# Patient Record
Sex: FEMALE | Race: OTHER | NOT HISPANIC OR LATINO | Employment: OTHER | URBAN - METROPOLITAN AREA
[De-identification: names, ages, dates, MRNs, and addresses within clinical notes are randomized per-mention and may not be internally consistent; named-entity substitution may affect disease eponyms.]

---

## 2017-12-28 ENCOUNTER — APPOINTMENT (OUTPATIENT)
Dept: WOMENS IMAGING | Facility: HOSPITAL | Age: 47
End: 2017-12-28

## 2017-12-28 PROCEDURE — 77067 SCR MAMMO BI INCL CAD: CPT | Performed by: RADIOLOGY

## 2018-10-09 ENCOUNTER — RESULTS ENCOUNTER (OUTPATIENT)
Dept: ONCOLOGY | Facility: CLINIC | Age: 48
End: 2018-10-09

## 2018-10-09 ENCOUNTER — CONSULT (OUTPATIENT)
Dept: ONCOLOGY | Facility: CLINIC | Age: 48
End: 2018-10-09

## 2018-10-09 VITALS
BODY MASS INDEX: 27.97 KG/M2 | HEART RATE: 98 BPM | RESPIRATION RATE: 18 BRPM | DIASTOLIC BLOOD PRESSURE: 77 MMHG | TEMPERATURE: 98.6 F | HEIGHT: 66 IN | SYSTOLIC BLOOD PRESSURE: 123 MMHG | WEIGHT: 174 LBS

## 2018-10-09 DIAGNOSIS — C50.412 MALIGNANT NEOPLASM OF UPPER-OUTER QUADRANT OF LEFT BREAST IN FEMALE, ESTROGEN RECEPTOR POSITIVE (HCC): ICD-10-CM

## 2018-10-09 DIAGNOSIS — C50.412 MALIGNANT NEOPLASM OF UPPER-OUTER QUADRANT OF LEFT BREAST IN FEMALE, ESTROGEN RECEPTOR POSITIVE (HCC): Primary | ICD-10-CM

## 2018-10-09 DIAGNOSIS — Z17.0 MALIGNANT NEOPLASM OF UPPER-OUTER QUADRANT OF LEFT BREAST IN FEMALE, ESTROGEN RECEPTOR POSITIVE (HCC): Primary | ICD-10-CM

## 2018-10-09 DIAGNOSIS — Z17.0 MALIGNANT NEOPLASM OF UPPER-OUTER QUADRANT OF LEFT BREAST IN FEMALE, ESTROGEN RECEPTOR POSITIVE (HCC): ICD-10-CM

## 2018-10-09 PROBLEM — C50.419 MALIGNANT NEOPLASM OF UPPER-OUTER QUADRANT OF BREAST IN FEMALE, ESTROGEN RECEPTOR POSITIVE: Status: ACTIVE | Noted: 2018-10-09

## 2018-10-09 PROCEDURE — 99245 OFF/OP CONSLTJ NEW/EST HI 55: CPT | Performed by: INTERNAL MEDICINE

## 2018-10-09 RX ORDER — TRAMADOL HYDROCHLORIDE 50 MG/1
TABLET ORAL
COMMUNITY
End: 2022-10-05 | Stop reason: SDUPTHER

## 2018-10-09 RX ORDER — GABAPENTIN 300 MG/1
CAPSULE ORAL
COMMUNITY
End: 2020-01-22 | Stop reason: ALTCHOICE

## 2018-10-09 RX ORDER — CYCLOBENZAPRINE HCL 10 MG
TABLET ORAL
COMMUNITY
Start: 2018-08-18 | End: 2022-04-21 | Stop reason: SDUPTHER

## 2018-10-09 RX ORDER — EMPAGLIFLOZIN 25 MG/1
TABLET, FILM COATED ORAL
COMMUNITY
Start: 2018-08-19 | End: 2022-06-17

## 2018-10-09 RX ORDER — FEXOFENADINE HCL 180 MG/1
180 TABLET ORAL DAILY
Status: CANCELLED | OUTPATIENT
Start: 2018-10-09

## 2018-10-09 RX ORDER — RANITIDINE 300 MG/1
TABLET ORAL
COMMUNITY
End: 2019-06-19 | Stop reason: SDUPTHER

## 2018-10-09 RX ORDER — LEVOTHYROXINE SODIUM 0.03 MG/1
TABLET ORAL
COMMUNITY
Start: 2018-10-08 | End: 2022-12-21 | Stop reason: ALTCHOICE

## 2018-10-09 RX ORDER — TAMOXIFEN CITRATE 20 MG/1
TABLET ORAL
COMMUNITY
Start: 2018-08-27 | End: 2019-10-21 | Stop reason: SDUPTHER

## 2018-10-09 NOTE — PROGRESS NOTES
CHIEF COMPLAINT: Management of breast cancer    REASON FOR REFERRAL: Management of breast cancer      RECORDS OBTAINED  Records of the patients history including those obtained from  Dr. marquez were reviewed and summarized in detail.    Oncology/Hematology History    1. Breast cancer: She had breast cancer biopsied 2/6/18 by Dr. marquez showing infiltrating ductal carcinoma [address] [address] 6 out of 960% 1+ HER-2/magaly, ER greater than 95% 2+, [place] 95% 2-3+.  On 3/13/18 she had lumpectomy for a 2.5 cm 0 out of one grade 2 infiltrating lobular carcinoma.  Mammaprint came back with a 10% distant risk of recurrence on hormonal blockade alone.  Because of the tumor over 2 cm in size, Dr. Tao recommended Taxotere and Cytoxan but after the second course of therapy she had a severe allergic reaction with hives.  She received 30 radiation treatments and, given a history of DEANA without BSO for fibroids in 2012, Dr. Toya Carrillo due to her hormonal levels and found her to be premenopausal.  Hence she was started on tamoxifen for which she has been taking gabapentin for hot flashes.  Still having a lot of dermatographia is.  Has a hiatal hernia as well as a hearing loss since that time.  She came to me for the first time 10/9/18 asking me to assume her care.  She was concerned regarding some hip pain for which she had been told in the past there was some tumor in the abdomen and seeing orthopedics for was told she did not need to do anything about it.  She also had hip injections for spinal stenosis with neurology and movable.  I asked her to get her prior bone imaging reports and discs and I'm getting a total body bone scan.  She had previous genetic testing August 2018 negative for BRCA and she is going to get those reports for rest of review what genetic test were done.  Assuming the bone scan is unremarkable and the outside imaging does not suggest any other worrisome bony abnormalities and I certainly think at least 5  "years of tamoxifen is reasonable.  She will continue with the gabapentin.         Malignant neoplasm of upper-outer quadrant of breast in female, estrogen receptor positive (CMS/HCC)    10/9/2018 Initial Diagnosis     Malignant neoplasm of upper-outer quadrant of breast in female, estrogen receptor positive (CMS/HCC)            HISTORY OF PRESENT ILLNESS:  The patient is a 48 y.o.  female, referred for management of breast cancer.  See above for her oncology history of present illness.    REVIEW OF SYSTEMS:  A 14 point review of systems was performed and is negative except as noted above.    Past Medical History:   Diagnosis Date   • Breast cancer (CMS/HCC) 2018   • Diabetes mellitus (CMS/HCC)    • Disease of thyroid gland      Past Surgical History:   Procedure Laterality Date   • BREAST LUMPECTOMY Left 02/2018   • KNEE ARTHROSCOPY  2004       No current outpatient prescriptions on file prior to visit.     No current facility-administered medications on file prior to visit.        Allergies   Allergen Reactions   • Codeine GI Intolerance   • Morphine GI Intolerance   • Taxotere [Docetaxel] Hives   • Cytoxan [Cyclophosphamide] Hives   • Zyrtec [Cetirizine] Rash       Social History     Social History   • Marital status: Unknown     Social History Main Topics   • Smoking status: Never Smoker   • Alcohol use Yes      Comment: twice per year   • Drug use: Unknown     Other Topics Concern   • Not on file       Family History   Problem Relation Age of Onset   • Uterine cancer Mother    • Lung cancer Father    • Breast cancer Sister        PHYSICAL EXAM:    /77   Pulse 98   Temp 98.6 °F (37 °C)   Resp 18   Ht 167.6 cm (66\")   Wt 78.9 kg (174 lb)   BMI 28.08 kg/m²     ECOG: (0) Fully active, able to carry on all predisease performance without restriction  General: well appearing female in no acute distress  HEENT: sclera anicteric, oropharynx clear  Lymphatics: no cervical, supraclavicular, inguinal, or " axillary adenopathy  Cardiovascular: regular rate and rhythm, no murmurs  Neck: Supple; No thyromegaly  Lungs: clear to auscultation bilaterally. No respiratory distress.   Abdomen: soft, nontender, nondistended.  No palpable organomegaly  Extremities: no cyanosis, clubbing, edema, or cords  Skin: no rashes, lesions, bruising, or petechiae  Neuro: Alert and oriented x 4; Moving all extremities.  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  Psych: No anxiety or depression  Left breast with some mild residual inflammation post radiation.  Right breast benign.  No visits with results within 6 Week(s) from this visit.   Latest known visit with results is:   No results found for any previous visit.       Assessment/Plan   Breast cancer: She had breast cancer biopsied 2/6/18 by Dr. marquez showing infiltrating ductal carcinoma [address] 6 out of 960% 1+ HER-2/magaly, ER greater than 95% 2+, [place] 95% 2-3+.  On 3/13/18 she had lumpectomy for a 2.5 cm 0 out of one grade 2 infiltrating lobular carcinoma.  Mammaprint came back with a 10% distant risk of recurrence on hormonal blockade alone.  Because of the tumor over 2 cm in size, Dr. Tao recommended Taxotere and Cytoxan but after the second course of therapy she had a severe allergic reaction with hives.  She received 30 radiation treatments and, given a history of DEANA without BSO for fibroids in 2012, Dr. Toya Carrillo due to her hormonal levels and found her to be premenopausal.  Hence she was started on tamoxifen for which she has been taking gabapentin for hot flashes.  Still having a lot of dermatographia is.  Has a hiatal hernia as well as a hearing loss since that time.  She came to me for the first time 10/9/18 asking me to assume her care.  She was concerned regarding some hip pain for which she had been told in the past there was some tumor in the abdomen and seeing orthopedics for was told she did not need to do anything about it.  She also had hip  injections for spinal stenosis with neurology and movable.  I asked her to get her prior bone imaging reports and discs and I'm getting a total body bone scan.  She had previous genetic testing August 2018 negative for BRCA and she is going to get those reports for rest of review what genetic test were done.  Assuming the bone scan is unremarkable and the outside imaging does not suggest any other worrisome bony abnormalities and I certainly think at least 5 years of tamoxifen is reasonable.  She will continue with the gabapentin.  She will get a mammogram in November as previously planned and she will follow-up with Dr. marquez regarding the residual inflammation and cellulitis.  Discussed with patient greater than 1 hour greater than 50% spent counseling        Derrick Terry MD    10/9/2018

## 2018-10-10 LAB
ALBUMIN SERPL-MCNC: 4.6 G/DL (ref 3.5–5.5)
ALBUMIN/GLOB SERPL: 1.8 {RATIO} (ref 1.2–2.2)
ALP SERPL-CCNC: 63 IU/L (ref 39–117)
ALT SERPL-CCNC: 18 IU/L (ref 0–32)
AST SERPL-CCNC: 20 IU/L (ref 0–40)
BASOPHILS # BLD AUTO: 0 X10E3/UL (ref 0–0.2)
BASOPHILS NFR BLD AUTO: 0 %
BILIRUB SERPL-MCNC: <0.2 MG/DL (ref 0–1.2)
BUN SERPL-MCNC: 14 MG/DL (ref 6–24)
BUN/CREAT SERPL: 20 (ref 9–23)
CALCIUM SERPL-MCNC: 10 MG/DL (ref 8.7–10.2)
CHLORIDE SERPL-SCNC: 101 MMOL/L (ref 96–106)
CO2 SERPL-SCNC: 19 MMOL/L (ref 20–29)
CREAT SERPL-MCNC: 0.71 MG/DL (ref 0.57–1)
EOSINOPHIL # BLD AUTO: 0.2 X10E3/UL (ref 0–0.4)
EOSINOPHIL NFR BLD AUTO: 3 %
ERYTHROCYTE [DISTWIDTH] IN BLOOD BY AUTOMATED COUNT: 14.6 % (ref 12.3–15.4)
GLOBULIN SER CALC-MCNC: 2.5 G/DL (ref 1.5–4.5)
GLUCOSE SERPL-MCNC: 137 MG/DL (ref 65–99)
HCT VFR BLD AUTO: 45.2 % (ref 34–46.6)
HGB BLD-MCNC: 14.9 G/DL (ref 11.1–15.9)
IMM GRANULOCYTES # BLD: 0 X10E3/UL (ref 0–0.1)
IMM GRANULOCYTES NFR BLD: 0 %
LYMPHOCYTES # BLD AUTO: 1.9 X10E3/UL (ref 0.7–3.1)
LYMPHOCYTES NFR BLD AUTO: 27 %
MCH RBC QN AUTO: 27.5 PG (ref 26.6–33)
MCHC RBC AUTO-ENTMCNC: 33 G/DL (ref 31.5–35.7)
MCV RBC AUTO: 83 FL (ref 79–97)
MONOCYTES # BLD AUTO: 0.3 X10E3/UL (ref 0.1–0.9)
MONOCYTES NFR BLD AUTO: 4 %
NEUTROPHILS # BLD AUTO: 4.5 X10E3/UL (ref 1.4–7)
NEUTROPHILS NFR BLD AUTO: 66 %
PLATELET # BLD AUTO: 309 X10E3/UL (ref 150–379)
POTASSIUM SERPL-SCNC: 4.5 MMOL/L (ref 3.5–5.2)
PROT SERPL-MCNC: 7.1 G/DL (ref 6–8.5)
RBC # BLD AUTO: 5.42 X10E6/UL (ref 3.77–5.28)
SODIUM SERPL-SCNC: 142 MMOL/L (ref 134–144)
WBC # BLD AUTO: 6.9 X10E3/UL (ref 3.4–10.8)

## 2018-10-23 ENCOUNTER — HOSPITAL ENCOUNTER (OUTPATIENT)
Dept: NUCLEAR MEDICINE | Facility: HOSPITAL | Age: 48
Discharge: HOME | End: 2018-10-23
Attending: INTERNAL MEDICINE

## 2018-10-23 DIAGNOSIS — Z17.0 MALIGNANT NEOPLASM OF UPPER-OUTER QUADRANT OF LEFT BREAST IN FEMALE, ESTROGEN RECEPTOR POSITIVE (HCC): ICD-10-CM

## 2018-10-23 DIAGNOSIS — C50.412 MALIGNANT NEOPLASM OF UPPER-OUTER QUADRANT OF LEFT BREAST IN FEMALE, ESTROGEN RECEPTOR POSITIVE (HCC): ICD-10-CM

## 2018-10-23 PROCEDURE — 78306 BONE IMAGING WHOLE BODY: CPT

## 2018-10-23 PROCEDURE — 0 TECHNETIUM MEDRONATE KIT: Performed by: INTERNAL MEDICINE

## 2018-10-23 PROCEDURE — A9503 TC99M MEDRONATE: HCPCS | Performed by: INTERNAL MEDICINE

## 2018-10-23 RX ORDER — TC 99M MEDRONATE 20 MG/10ML
26.2 INJECTION, POWDER, LYOPHILIZED, FOR SOLUTION INTRAVENOUS
Status: COMPLETED | OUTPATIENT
Start: 2018-10-23 | End: 2018-10-23

## 2018-10-23 RX ADMIN — Medication 26.2 MILLICURIE: at 09:20

## 2018-11-13 ENCOUNTER — OFFICE VISIT (OUTPATIENT)
Dept: ONCOLOGY | Facility: CLINIC | Age: 48
End: 2018-11-13

## 2018-11-13 VITALS
HEIGHT: 66 IN | BODY MASS INDEX: 28.12 KG/M2 | RESPIRATION RATE: 16 BRPM | SYSTOLIC BLOOD PRESSURE: 141 MMHG | TEMPERATURE: 97.8 F | DIASTOLIC BLOOD PRESSURE: 86 MMHG | HEART RATE: 93 BPM | WEIGHT: 175 LBS

## 2018-11-13 DIAGNOSIS — C50.412 MALIGNANT NEOPLASM OF UPPER-OUTER QUADRANT OF LEFT BREAST IN FEMALE, ESTROGEN RECEPTOR POSITIVE (HCC): Primary | ICD-10-CM

## 2018-11-13 DIAGNOSIS — Z17.0 MALIGNANT NEOPLASM OF UPPER-OUTER QUADRANT OF LEFT BREAST IN FEMALE, ESTROGEN RECEPTOR POSITIVE (HCC): Primary | ICD-10-CM

## 2018-11-13 PROCEDURE — 99214 OFFICE O/P EST MOD 30 MIN: CPT | Performed by: INTERNAL MEDICINE

## 2018-11-13 NOTE — PROGRESS NOTES
CHIEF COMPLAINT: Follow-up breast cancer    Problem List:  Oncology/Hematology History    1. Breast cancer: She had breast cancer biopsied 2/6/18 by Dr. marquez showing infiltrating ductal carcinoma [address] 6 out of 960% 1+ HER-2/magaly, ER greater than 95% 2+, CT 95% 2-3+.  On 3/13/18 she had lumpectomy for a 2.5 cm 0 out of one grade 2 infiltrating lobular carcinoma.  Mammaprint came back with a 10% distant risk of recurrence on hormonal blockade alone.  Because of the tumor over 2 cm in size, Dr. Tao recommended Taxotere and Cytoxan but after the second course of therapy she had a severe allergic reaction with hives.  She received 30 radiation treatments and, given a history of DENAA without BSO for fibroids in 2012, Dr. Toya Carrillo due to her hormonal levels and found her to be premenopausal.  Hence she was started on tamoxifen for which she has been taking gabapentin for hot flashes.  Still having a lot of dermatographia is.  Has a hiatal hernia as well as a hearing loss since that time.  She came to me for the first time 10/9/18 asking me to assume her care.  She was concerned regarding some hip pain for which she had been told in the past there was some tumor in the abdomen and seeing orthopedics for was told she did not need to do anything about it.  She also had hip injections for spinal stenosis with neurology and movable.  I asked her to get her prior bone imaging reports and discs and I'm getting a total body bone scan.  She had previous genetic testing August 2018 negative for BRCA and she is going to get those reports for rest of review what genetic test were done.  Assuming the bone scan is unremarkable and the outside imaging does not suggest any other worrisome bony abnormalities and I certainly think at least 5 years of tamoxifen is reasonable.  She will continue with the gabapentin.     Per records from Dr. Dr. Tao, she completed radiation to the left breast 7/30/18.  Because of  shortness of breath with pleurisy, CT PE protocol 5/8/18 was done and no PE found.  ENT evaluation 5/17/18 with flexible laryngoscopy showed widely patent airway and prednisone was tapered post hypersensitivity reaction to the Taxotere.  Per note from . Dr. Tao of 8/27/18, BRCA1 and BRCA2 mutation analysis was ordered.  This was done with [employer] which showed no clinically significant mutation but there was a variant of uncertain significance found.  I recommended repeating this with cancernext panel for more thorough evaluation.  According to a note from her primary care in 2015 there was mention of an MRI showing no change in a thigh lipoma which I suspect is the mass she is referring to.  I performed total body bone scan 10/23/18 which was entirely negative.  She does have a large mass in the superior lateral aspect of her left thigh which has not grown and likely lipomatous but given the size of this, I am referring her to [facility] for evaluation.  She brought her discs with her on her visit to see me on 11/13/18 and she will take those to Dr. Parham and then bring them back to me for us to load into our system for the future.  In the meantime I will get her to our genetic counselors for further testing and to have her monitored over time in the event that her variant of undetermined significance becomes one that is determined to be significant.  She is due for her mammogram 11/14/18 and she will see my nurse practitioner back in about 6 weeks and hopefully in the meantime we will have some answers from Dr. Parham and we will see where the genetic counseling stands.  Her CBC and CMP were essentially unremarkable save for slightly elevated glucose.        Malignant neoplasm of upper-outer quadrant of breast in female, estrogen receptor positive (CMS/HCC)    10/9/2018 Initial Diagnosis     Malignant neoplasm of upper-outer quadrant of breast in female, estrogen receptor positive (CMS/HCC)       "      HISTORY OF PRESENT ILLNESS:  The patient is a 48 y.o. female, here for follow up on management of adjuvant therapy of breast cancer      Past Medical History:   Diagnosis Date   • Breast cancer (CMS/HCC) 2018   • Diabetes mellitus (CMS/HCC)    • Disease of thyroid gland      Past Surgical History:   Procedure Laterality Date   • BREAST LUMPECTOMY Left 02/2018   • KNEE ARTHROSCOPY  2004       Allergies   Allergen Reactions   • Codeine GI Intolerance   • Morphine GI Intolerance   • Taxotere [Docetaxel] Hives   • Cytoxan [Cyclophosphamide] Hives   • Zyrtec [Cetirizine] Rash       Family History and Social History reviewed and changed as necessary      REVIEW OF SYSTEM:   Review of Systems   Constitutional: Negative for appetite change, chills, diaphoresis, fatigue, fever and unexpected weight change.   HENT:   Negative for mouth sores, sore throat and trouble swallowing.    Eyes: Negative for icterus.   Respiratory: Negative for cough, hemoptysis and shortness of breath.    Cardiovascular: Negative for chest pain, leg swelling and palpitations.   Gastrointestinal: Negative for abdominal distention, abdominal pain, blood in stool, constipation, diarrhea, nausea and vomiting.   Endocrine: Negative for hot flashes.   Genitourinary: Negative for bladder incontinence, difficulty urinating, dysuria, frequency and hematuria.    Musculoskeletal: Negative for gait problem, neck pain and neck stiffness.   Skin: Negative for rash.   Neurological: Negative for dizziness, gait problem, headaches, light-headedness and numbness.   Hematological: Negative for adenopathy. Does not bruise/bleed easily.   Psychiatric/Behavioral: Negative for depression. The patient is not nervous/anxious.    All other systems reviewed and are negative.       PHYSICAL EXAM    Vitals:    11/13/18 0947   BP: 141/86   Pulse: 93   Resp: 16   Temp: 97.8 °F (36.6 °C)   Weight: 79.4 kg (175 lb)   Height: 167.6 cm (66\")     Constitutional: Appears " well-developed and well-nourished. No distress.   ECOG: (0) Fully active, able to carry on all predisease performance without restriction  HENT:   Head: Normocephalic.   Mouth/Throat: Oropharynx is clear and moist.   Eyes: Conjunctivae are normal. Pupils are equal, round, and reactive to light. No scleral icterus.   Neck: Neck supple. No JVD present. No thyromegaly present.   Cardiovascular: Normal rate, regular rhythm and normal heart sounds.    Pulmonary/Chest: Breath sounds normal. No respiratory distress.   Abdominal: Soft. Exhibits no distension and no mass. There is no hepatosplenomegaly. There is no tenderness. There is no rebound and no guarding.   Musculoskeletal:Exhibits no edema, tenderness or deformity.   Neurological: Alert and oriented to person, place, and time. Exhibits normal muscle tone.   Skin: No ecchymosis, no petechiae and no rash noted. Not diaphoretic. No cyanosis. Nails show no clubbing.   Psychiatric: Normal mood and affect.   Vitals reviewed.      No radiology results for the last 7 days          ASSESSMENT & PLAN:    1. Breast cancer:   2. History of DEANA/BSO   3. BRCA variant of undetermined significance   4. 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  On 3/13/18 she had lumpectomy for a 2.5 cm 0 out of one grade 2 infiltrating lobular carcinoma.  Mammaprint came back with a 10% distant risk of recurrence on hormonal blockade alone.  Because of the tumor over 2 cm in size, Dr. Tao recommended Taxotere and Cytoxan but after the second course of therapy she had a severe allergic reaction with hives.  She received 30 radiation treatments and, given a history of DEANA without BSO for fibroids in 2012, Dr. Toya Carrillo due to her hormonal levels and found her to be  premenopausal.  Hence she was started on tamoxifen for which she has been taking gabapentin for hot flashes.  Still having a lot of dermatographia is.  Has a hiatal hernia as well as a hearing loss since that time.  She came to me for the first time 10/9/18 asking me to assume her care.  She was concerned regarding some hip pain for which she had been told in the past there was some tumor in the abdomen and seeing orthopedics for was told she did not need to do anything about it.  She also had hip injections for spinal stenosis with neurology and movable.  I asked her to get her prior bone imaging reports and discs and I'm getting a total body bone scan.  She had previous genetic testing August 2018 negative for BRCA and she is going to get those reports for rest of review what genetic test were done.  Assuming the bone scan is unremarkable and the outside imaging does not suggest any other worrisome bony abnormalities and I certainly think at least 5 years of tamoxifen is reasonable.  She will continue with the gabapentin.     Per records from Dr. Tao, she completed radiation to the left breast 7/30/18.  Because of shortness of breath with pleurisy, CT PE protocol 5/8/18 was done and no PE found.  ENT evaluation 5/17/18 with flexible laryngoscopy showed widely patent airway and prednisone was tapered post hypersensitivity reaction to the Taxotere.  Per note from . Dr. Tao of 8/27/18, BRCA1 and BRCA2 mutation analysis was ordered.  This was done with [employer] which showed no clinically significant mutation but there was a variant of uncertain significance found.  I recommended repeating this with cancernext panel for more thorough evaluation.  According to a note from her primary care in 2015 there was mention of an MRI showing no change in a thigh lipoma which I suspect is the mass she is referring to.  I performed total body bone scan 10/23/18 which was entirely negative.  She does have a large mass in  the superior lateral aspect of her left thigh which has not grown and likely lipomatous but given the size of this, I am referring her to [facility] Parham for evaluation to see whether he thinks given the size of this we need to remove it to be sure that this has not any sarcomatous component.  It has been symptomatic for her periodically with some local nerve impingement.  She brought her discs with her on her visit to see me on 11/13/18 and she will take those to Dr. Parham and then bring them back to me for us to load into our system for the future.  In the meantime I will get her to our genetic counselors for further testing and to have her monitored over time in the event that her variant of undetermined significance becomes one that is determined to be significant.  She is due for her mammogram 11/14/18 and she will see my nurse practitioner back in about 6 weeks and hopefully in the meantime we will have some answers from Dr. Parham and we will see where the genetic counseling stands.  Her CBC and CMP were essentially unremarkable save for slightly elevated glucose.  Discussed with patient 25 minutes greater than 50% spent counseling.      Derrick Terry MD    11/13/2018

## 2018-11-20 DIAGNOSIS — R22.42 MASS OF LEFT THIGH: Primary | ICD-10-CM

## 2018-11-27 ENCOUNTER — TELEPHONE (OUTPATIENT)
Dept: ONCOLOGY | Facility: CLINIC | Age: 48
End: 2018-11-27

## 2018-11-27 RX ORDER — VENLAFAXINE HYDROCHLORIDE 75 MG/1
75 CAPSULE, EXTENDED RELEASE ORAL DAILY
Qty: 30 CAPSULE | Refills: 1 | Status: SHIPPED | OUTPATIENT
Start: 2018-11-27 | End: 2019-01-10 | Stop reason: SDUPTHER

## 2018-11-27 NOTE — TELEPHONE ENCOUNTER
----- Message from Yvette Baires sent at 11/20/2018 10:24 AM EST -----  Regarding: [facility]-MAMMO[facility] RESULTS  Contact: [phone]  Patient wants to get her mammogram results?

## 2018-11-27 NOTE — TELEPHONE ENCOUNTER
Mammogram results given.  Patient states she is having severe hot flashes since starting tamoxifen in August.  She wants to know if there is anything she can take.  Discussed with Dr. Terry.  She is already on gabapentin. Will send rx for effexor XR 75 mg daily.  Patient notified and verbalized and verbalized understanding.

## 2018-11-28 ENCOUNTER — CLINICAL SUPPORT (OUTPATIENT)
Dept: GENETICS | Facility: HOSPITAL | Age: 48
End: 2018-11-28

## 2018-11-28 DIAGNOSIS — Z17.0 MALIGNANT NEOPLASM OF BREAST IN FEMALE, ESTROGEN RECEPTOR POSITIVE, UNSPECIFIED LATERALITY, UNSPECIFIED SITE OF BREAST (HCC): Primary | ICD-10-CM

## 2018-11-28 DIAGNOSIS — Z80.3 FAMILY HISTORY OF BREAST CANCER: ICD-10-CM

## 2018-11-28 DIAGNOSIS — C50.919 MALIGNANT NEOPLASM OF BREAST IN FEMALE, ESTROGEN RECEPTOR POSITIVE, UNSPECIFIED LATERALITY, UNSPECIFIED SITE OF BREAST (HCC): Primary | ICD-10-CM

## 2018-11-28 PROCEDURE — 96040: CPT | Performed by: GENETIC COUNSELOR, MS

## 2018-11-30 ENCOUNTER — TELEPHONE (OUTPATIENT)
Dept: ONCOLOGY | Facility: HOSPITAL | Age: 48
End: 2018-11-30

## 2018-11-30 NOTE — PROGRESS NOTES
ADDENDUM 12/7/18: Results were obtained from [facility] confirming that a 28 gene MyRisk panel had been performed, which included all high and moderate risk breast cancer genes as well as genes related to other cancer risks.  Two variants of uncertain significance (VUS) were identified, in the BARD1 and PMS2 genes.  The ClinVar database was referenced, which did not show any alternative interpretations of either VUS.  [facility] now has our contact information to send any future reclassification reports, but again, the majority of variants are ultimately reclassified to benign changes, therefore this result does not change Ms. Sky's management.          Porsha Bolden, a 48-year-old female, was referred for genetic counseling to discuss the results of genetic testing that was ordered by her previous oncologist.  Ms. Bolden has a personal history of breast cancer diagnosed at age 47. She had a lumpectomy and underwent radiation therapy and chemotherapy.  She is now on tamoxifen.  She has had a hysterectomy but retains her ovaries.  Based on her personal history of breast cancer, Ms. Bolden’s oncologist ordered a multigene panel, the MyRisk panel from [facility], which analyzes up to 29 genes known to be associated with an increased cancer risk. A portion of the report was available for review, which showed that genetic testing was negative for known deleterious/pathogenic mutations.  The version of the report that we received did not outline the specific genes that were evaluated.  A full copy of the report has been requested form [facility].  While no known deleterious mutations were identified, the report did state that a variant of uncertain significance was identified, however this copy of the report did not state which gene the variant was identified.  Variants are changes in DNA that may or may not affect the function of the gene.  The classification of a variant to either a benign gene  change or deleterious mutation depends on a number of factors.  The majority (estimated to be >90%) of VUS’s are eventually reclassified as benign gene changes. It is not uncommon for a variant to be reported through multigene panel testing, given the number of gene being evaluated and the presence of genetic variation in the population.  It is not recommended that any unaffected relatives be tested for a VUS at this time, and clinical management should not be altered based on this variant. Once we receive a more detailed report from [facility], we will be following up with Ms. Bolden by telephone.      PERTINENT FAMILY HISTORY:   Mother:  Uterine cancer, 46     Bone cancer. 46  Mat. Half-sister: Breast cancer, 35  Mat. Aunt:  Colon cancer, 50  Father:   Brain cancer, 68     Lung cancer, 68  Pat. Aunt:  Stomach cancer, 60  Pat. Grandmother: Brain cancer, 65  Pat. Grandfather: Lung cancer, 65  Pat. 1st cousin: Leukemia, 5    We do not have medical records regarding the diagnoses in Ms. Bolden’s family.    RISK ASSESSMENT:  Ms. Bolden’s personal and family history of breast cancer led to the question of a hereditary cancer syndrome.  Ms. Bolden met NCCN guidelines criteria for BRCA1/2 testing based on her personal and family history of breast cancer.  Testing for BRCA1/2 and a panel of additional susceptibility genes (MyRisk panel) was negative for deleterious mutations in Ms. Bolden, reducing the likelihood for her to have a hereditary cancer syndrome.     GENETIC COUNSELING: (30 minutes) We reviewed the family history information in detail.  Cancer is very common; approximately 1 in 3 individuals will develop cancer within a lifetime.  It is not uncommon to see multiple individuals within a family with cancer given the high chance for a person to develop cancer.  The interaction of many factors determines each person’s personal risk, including age, family or personal history of cancer, and external factors such  as diet and environmental exposures.  Exactly how these various risk factors interact in an individual is unclear.  Most often, we believe cancer to be a multifactorial disease caused by an accumulation of genetic alterations acquired over our lifetime, with environmental factors acting in the development of a malignancy.    Cases of cancer follow three general patterns: sporadic, familial, and hereditary.  While most cancer is sporadic, some cases appear to occur in family clusters.  These cases are said to be familial and account for 10-20% of cancer cases.  Familial cases may be due to a combination of shared genes and environmental factors among family members.  In even fewer families, the cancer is said to be inherited, and the genes responsible for the cancer are known.      Family histories typical of hereditary cancer syndromes usually include multiple first- and second-degree relatives diagnosed with cancer types that define a syndrome.  These cases tend to be diagnosed at younger-than-expected ages and can be bilateral or multifocal.  The cancer in these families follows an autosomal dominant inheritance pattern, which indicates the likely presence of a mutation in a cancer susceptibility gene.  Children and siblings of an individual believed to carry this mutation have a 50% chance of inheriting that mutation, thereby inheriting the increased risk to develop cancer.  These mutations can be passed down from the maternal or the paternal lineage. Hereditary breast cancer accounts for 5-10% of all cases of breast cancer. A significant proportion of hereditary breast and ovarian cancer can be attributed to mutations in the BRCA1 and BRCA2 genes. Mutations in these genes confer an increased risk for breast cancer, ovarian cancer, male breast cancer, prostate cancer and pancreatic cancer; however, there are other genes known to contribute to cancer risk.     We discussed the panel testing that was already  completed for Ms. Bolden, which involved testing for BRCA1/2, as well as additional genes that are associated with an increased cancer risk. The [facility] panel is comprehensive and includes clinically significant high risk and moderate risk breast cancer associated genes, as well as genes associated with other cancer risks. We are waiting on a complete copy of the results to confirm which genes were included at the time Ms. Bolden’s testing was ordered.  We discussed that with multigene panels, it is not uncommon to obtain a result that is uninformative or ambiguous due to the identification of a genetic variant. These variants may or may not be associated with an increased cancer risk.     TEST RESULTS: Genetic testing was negative for known deleterious/pathogenic mutations in BRCA1 and BRCA2 as well as up to 27 other genes included on the MyRisk panel by sequencing and rearrangement testing.   A VUS was identified.  A VUS is a finding that may or may not impact the function of the gene.  VUSs are not clinically actionable findings, and therefore this result does not impact management in any way at this time.  The majority of VUSs are ultimately reclassified to benign changes.  The identification of a VUS is common in multigene panel testing, given the number of genes being evaluated.  If this variant is ever reclassified, a new report will be issued by the laboratory and released directly to our office.  This assessment is based on the information provided at the time of the consultation.    CANCER PREVENTION: Despite the genetic test results that were negative for deleterious mutations, Ms. Bolden’s female relatives may have a somewhat increased lifetime risk for breast cancer based on family history.  Given the possible increased risk, options available to individuals with a high lifetime risk for breast cancer were briefly discussed.  Relatives could have individual risk assessments performed to determine their  breast cancer risk.      Surveillance for individuals with a high lifetime risk of breast cancer (>20%), based on NCCN guidelines, would consist of semi-annual clinical breast exams and monthly self-breast exams starting by age 18 and annual mammography starting 10 years younger than the earliest diagnosis in the family, or age 40, whichever is earliest.  According to an American Cancer Society expert panel, annual breast MRI should be offered to women whose lifetime risk of breast cancer is 20-25 percent or more.  Relatives may have a personalized risk assessment performed to quantify their breast cancer risk using a family history based risk model, such as the Tyrer-Cuzick model.      PLAN: Genetic counseling remains available for Ms. Bolden and her family.  I will be following up with Ms. Bolden by telephone once we receive a complete copy of her genetic test results from [facility].  We have also updated her account with [employer] so that future correspondence regarding variant reclassification is sent to our office. If Ms. Bolden has any questions, concerns, or updates to the family history she is welcome to call us.       Orly Hanley, MS, [facility] [place]        Licensed Certified Genetic Counselor    Cc: MD Porsha Boss

## 2018-11-30 NOTE — TELEPHONE ENCOUNTER
Patient was having severe hot flashes with tamoxifen therapy and was also inquiring about other medication related questions. She was recently put on venlafaxine for her hot flashes which she says has helped a lot. However, she stated that the pharmacy informed her of a drug-drug interaction. She is currently on venlafaxine, tramadol, and cyclobenzaprine all of which affect serotonin and increase the risk of serotonin syndrome. I counseled her that this was rare and is seen with long term use at high doses of agents affecting serotonin. I did however encourage her to attempt to wean off either the cyclobenzaprine or tramadol or transition to a different agent. I counseled her on signs and symptoms of serotonin syndrome and to discuss this with her PCP. She was receptive of this information and expressed understanding.

## 2019-01-10 ENCOUNTER — OFFICE VISIT (OUTPATIENT)
Dept: ONCOLOGY | Facility: CLINIC | Age: 49
End: 2019-01-10

## 2019-01-10 VITALS
HEIGHT: 66 IN | TEMPERATURE: 97.8 F | HEART RATE: 96 BPM | SYSTOLIC BLOOD PRESSURE: 118 MMHG | RESPIRATION RATE: 18 BRPM | WEIGHT: 174 LBS | BODY MASS INDEX: 27.97 KG/M2 | DIASTOLIC BLOOD PRESSURE: 82 MMHG

## 2019-01-10 DIAGNOSIS — Z12.31 SCREENING MAMMOGRAM, ENCOUNTER FOR: ICD-10-CM

## 2019-01-10 DIAGNOSIS — R23.2 HOT FLASHES DUE TO TAMOXIFEN: ICD-10-CM

## 2019-01-10 DIAGNOSIS — R22.42 MASS OF LEFT THIGH: ICD-10-CM

## 2019-01-10 DIAGNOSIS — C50.412 MALIGNANT NEOPLASM OF UPPER-OUTER QUADRANT OF LEFT BREAST IN FEMALE, ESTROGEN RECEPTOR POSITIVE (HCC): Primary | ICD-10-CM

## 2019-01-10 DIAGNOSIS — Z17.0 MALIGNANT NEOPLASM OF UPPER-OUTER QUADRANT OF LEFT BREAST IN FEMALE, ESTROGEN RECEPTOR POSITIVE (HCC): Primary | ICD-10-CM

## 2019-01-10 DIAGNOSIS — M25.552 LEFT HIP PAIN: ICD-10-CM

## 2019-01-10 DIAGNOSIS — T45.1X5A HOT FLASHES DUE TO TAMOXIFEN: ICD-10-CM

## 2019-01-10 PROCEDURE — 99214 OFFICE O/P EST MOD 30 MIN: CPT | Performed by: NURSE PRACTITIONER

## 2019-01-10 RX ORDER — VENLAFAXINE HYDROCHLORIDE 75 MG/1
75 CAPSULE, EXTENDED RELEASE ORAL DAILY
Qty: 30 CAPSULE | Refills: 5 | Status: SHIPPED | OUTPATIENT
Start: 2019-01-10 | End: 2019-07-23 | Stop reason: SDUPTHER

## 2019-01-10 NOTE — PROGRESS NOTES
CHIEF COMPLAINT: Left hip/thigh pain    Problem List:  Oncology/Hematology History    1. Left breast cancer: She had breast cancer biopsied 2/6/18 by Dr. marquez showing infiltrating ductal carcinoma [address] 6 out of 960% 1+ HER-2/magaly, ER greater than 95% 2+, OH 95% 2-3+.  On 3/13/18 she had lumpectomy for a 2.5 cm 0 out of one grade 2 infiltrating lobular carcinoma.  Mammaprint came back with a 10% distant risk of recurrence on hormonal blockade alone.  Because of the tumor over 2 cm in size, Dr. Tao recommended Taxotere and Cytoxan but after the second course of therapy she had a severe allergic reaction with hives.  She received 30 radiation treatments and, given a history of DEANA without BSO for fibroids in 2012, Dr. Toya Carrillo due to her hormonal levels and found her to be premenopausal.  Hence she was started on tamoxifen for which she has been taking gabapentin for hot flashes.  Still having a lot of dermatographia is.  Has a hiatal hernia as well as a hearing loss since that time.  She came to me for the first time 10/9/18 asking me to assume her care.  She was concerned regarding some hip pain for which she had been told in the past there was some tumor in the abdomen and seeing orthopedics for was told she did not need to do anything about it.  She also had hip injections for spinal stenosis with neurology and movable.  I asked her to get her prior bone imaging reports and discs and I'm getting a total body bone scan.  She had previous genetic testing August 2018 negative for BRCA and she is going to get those reports for rest of review what genetic test were done.  Assuming the bone scan is unremarkable and the outside imaging does not suggest any other worrisome bony abnormalities and I certainly think at least 5 years of tamoxifen is reasonable.  She will continue with the gabapentin.     Per records from Dr. Dr. Tao, she completed radiation to the left breast 7/30/18.  Because of  shortness of breath with pleurisy, CT PE protocol 5/8/18 was done and no PE found.  ENT evaluation 5/17/18 with flexible laryngoscopy showed widely patent airway and prednisone was tapered post hypersensitivity reaction to the Taxotere.  Per note from . Dr. Tao of 8/27/18, BRCA1 and BRCA2 mutation analysis was ordered.  This was done with [facility] which showed no clinically significant mutation but there was a variant of uncertain significance found.  I recommended repeating this with [facility]next panel for more thorough evaluation.  According to a note from her primary care in 2015 there was mention of an MRI showing no change in a thigh lipoma which I suspect is the mass she is referring to.  I performed total body bone scan 10/23/18 which was entirely negative.  She does have a large mass in the superior lateral aspect of her left thigh which has not grown and likely lipomatous but given the size of this, I am referring her to [facility] Par[facility] for evaluation.  She brought her discs with her on her visit to see me on 11/13/18 and she will take those to Dr. Parham and then bring them back to me for us to load into our system for the future.  In the meantime I will get her to our genetic counselors for further testing and to have her monitored over time in the event that her variant of undetermined significance becomes one that is determined to be significant.  She is due for her mammogram 11/14/18 and she will see my nurse practitioner back in about 6 weeks and hopefully in the meantime we will have some answers from Dr. Parham and we will see where the genetic counseling stands.  Her CBC and CMP were essentially unremarkable save for slightly elevated glucose.        Malignant neoplasm of upper-outer quadrant of left breast in female, estrogen receptor positive (CMS/HCC)    2/6/2018 Initial Diagnosis     Malignant neoplasm of upper-outer quadrant of breast in female, estrogen receptor positive (CMS/HCC)           Genetic Testing     Genetic testing Results were obtained from [facility] confirming that a 28 gene [email or web address] panel had been performed, which included all high and moderate risk breast cancer genes as well as genes related to other cancer risks.  Two variants of uncertain significance (VUS) were identified, in the BARD1 and PMS2 genes.  The ClinVar database was referenced, which did not show any alternative interpretations of either VUS.              10/23/2018 Imaging     Total body bone scan           Mass of left thigh    3/23/2015 Initial Diagnosis     Mass of left thigh         3/23/2015 Imaging     MRI left lower extremity Impression:  Abnormal left tensor fascial des muscle of uncertain significance.  That signal may represent a lipoma but is more infiltrative and unusual.  Other neoplastic process including liposarcoma cannot be excluded.  Mild contrast enhancement and air present muscle injury, muscle inflammation or less likely, neoplastic involvement.            HISTORY OF PRESENT ILLNESS:  The patient is a 48 y.o. female, here for follow up on management of left breast cancer.  The patient had left diagnostic mammogram on 11/14/2018 that was negative, unfortunately she was also due for right mammogram but that was not scheduled.  Taking tamoxifen and tolerating overall fairly well.  He did start her on Effexor for hot flashes and this seems to have helped him what.  They are at least now tolerable.  Continues to have left hip pain.  Was found to have left hip/thigh mass sometime around 2015 at which time she underwent MRI as outlined above in the patient's history, she was seen by apparently a couple of different orthopedist who did not feel he was operable or not necessary to surgically remove, I do not have data regarding these consults.  The patient states that further workup led to MRI of her spine and they felt that her hip pain was more related to spinal issues rather than this left  hip/thigh mass and she has been seen pain management ever since.  Apparently was scheduled to have spinal CORD stimulator placed in the upcoming months but that has been put on hold with her diagnosis of breast cancer.  She was referred [facility] by Dr. Terry however they would like patient to undergo repeat MRI prior to consultation.      Past Medical History:   Diagnosis Date   • Breast cancer (CMS/HCC) 2018   • Diabetes mellitus (CMS/HCC)    • Disease of thyroid gland      Past Surgical History:   Procedure Laterality Date   • BREAST LUMPECTOMY Left 02/2018   • KNEE ARTHROSCOPY  2004       Allergies   Allergen Reactions   • Codeine GI Intolerance   • Morphine GI Intolerance   • Taxotere [Docetaxel] Hives   • Cytoxan [Cyclophosphamide] Hives   • Zyrtec [Cetirizine] Rash       Family History and Social History reviewed and changed as necessary      REVIEW OF SYSTEM:   Review of Systems   Constitutional: Negative for appetite change, chills, diaphoresis, fatigue, fever and unexpected weight change.   HENT:   Negative for mouth sores, sore throat and trouble swallowing.    Eyes: Negative for icterus.   Respiratory: Negative for cough, hemoptysis and shortness of breath.    Cardiovascular: Negative for chest pain, leg swelling and palpitations.   Gastrointestinal: Negative for abdominal distention, abdominal pain, blood in stool, constipation, diarrhea, nausea and vomiting.   Endocrine: Positive for hot flashes.   Genitourinary: Negative for bladder incontinence, difficulty urinating, dysuria, frequency and hematuria.    Musculoskeletal: Has chronic left hip pain affecting gait.  Skin: Negative for rash.   Neurological: Negative for dizziness, gait problem, headaches, light-headedness and numbness.   Hematological: Negative for adenopathy. Does not bruise/bleed easily.   Psychiatric/Behavioral: Negative for depression. The patient is not nervous/anxious.    All other systems reviewed and are  "negative.       PHYSICAL EXAM    Vitals:    01/10/19 0956   BP: 118/82   Pulse: 96   Resp: 18   Temp: 97.8 °F (36.6 °C)   Weight: 78.9 kg (174 lb)   Height: 167.6 cm (66\")     Constitutional: Appears well-developed and well-nourished. No distress.   ECOG: (0) Fully active, able to carry on all predisease performance without restriction  HENT:   Head: Normocephalic.   Mouth/Throat: Oropharynx is clear and moist.   Eyes: Conjunctivae are normal. Pupils are equal, round, and reactive to light. No scleral icterus.   Neck: Neck supple. No JVD present.    Pulmonary/Chest: No respiratory distress.   Abdominal: Soft. Exhibits no distension.   Musculoskeletal:Exhibits no edema, tenderness.  Soft tissue mass left hip.  Neurological: Alert and oriented to person, place, and time. Exhibits normal muscle tone.   Skin: No ecchymosis, no petechiae and no rash noted. Not diaphoretic. No cyanosis.    Psychiatric: Normal mood and affect.   Vitals reviewed.  Diagnostic data:  All previous office notes and radiology reports available in Epic reviewed at time of visit.        ASSESSMENT & PLAN:    1.  Left breast cancer, stage IA, pT2, pN0 cM0 G2, ER positive, Her2 negative, status post 2 cycles of Taxotere and Cytoxan discontinued due to reaction, currently on adjuvant tamoxifen  2.  Hot flashes secondary to tamoxifen    Discussion: The patient is overall tolerating adjuvant tamoxifen with planned 5 years of therapy.  She does have hot flashes, we started her on Effexor and this seems to have helped less and their effects somewhat, she didn't need a refill today and I sent that electronically to her pharmacy.  She had diagnostic left mammogram on 11/14/2018 that was negative, BI-RADS 2.  Unfortunately she is also due for right screening mammogram that was not done, I will get that scheduled and then she should be up-to-date to where we do not have to do her breasts separately.  She will be due for left breast short-term follow-up in " May post lumpectomy and radiation protocol.  She did meet with our genetic counselors and records were received from [facility].  Previous testing did reveal 2 variants of uncertain significance, this does not change her current plans, they do have our contact information in the event that there are any future reclassification reports.    3.  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  We will repeat the MRI for current evaluation and see her back for follow-up to go over the results.  Pending these results twe will get her to Dr. Parham for evaluation if appropriate.      I spent 25 minutes with the patient. I spent > 50% percent of this time counseling and discussing prognosis, diagnostic testing, evaluation, current status and management.    Orly Masterson, APRN    01/10/2019

## 2019-01-22 ENCOUNTER — OFFICE VISIT (OUTPATIENT)
Dept: ONCOLOGY | Facility: CLINIC | Age: 49
End: 2019-01-22

## 2019-01-22 VITALS
HEIGHT: 66 IN | BODY MASS INDEX: 27.97 KG/M2 | SYSTOLIC BLOOD PRESSURE: 124 MMHG | WEIGHT: 174 LBS | RESPIRATION RATE: 18 BRPM | HEART RATE: 97 BPM | DIASTOLIC BLOOD PRESSURE: 81 MMHG | TEMPERATURE: 98.3 F

## 2019-01-22 DIAGNOSIS — Z17.0 MALIGNANT NEOPLASM OF UPPER-OUTER QUADRANT OF LEFT BREAST IN FEMALE, ESTROGEN RECEPTOR POSITIVE (HCC): Primary | ICD-10-CM

## 2019-01-22 DIAGNOSIS — C50.412 MALIGNANT NEOPLASM OF UPPER-OUTER QUADRANT OF LEFT BREAST IN FEMALE, ESTROGEN RECEPTOR POSITIVE (HCC): Primary | ICD-10-CM

## 2019-01-22 PROCEDURE — 99214 OFFICE O/P EST MOD 30 MIN: CPT | Performed by: INTERNAL MEDICINE

## 2019-01-22 NOTE — PROGRESS NOTES
CHIEF COMPLAINT: Follow-up breast cancer    Problem List:  Oncology/Hematology History    1. Left breast cancer: She had breast cancer biopsied 2/6/18 by Dr. marquez showing infiltrating ductal carcinoma [address] 6 out of 960% 1+ HER-2/magaly, ER greater than 95% 2+, GA 95% 2-3+.  On 3/13/18 she had lumpectomy for a 2.5 cm 0 out of one grade 2 infiltrating lobular carcinoma.  Mammaprint came back with a 10% distant risk of recurrence on hormonal blockade alone.  Because of the tumor over 2 cm in size, Dr. Tao recommended Taxotere and Cytoxan but after the second course of therapy she had a severe allergic reaction with hives.  She received 30 radiation treatments and, given a history of DEANA without BSO for fibroids in 2012, Dr. Toya Carrillo due to her hormonal levels and found her to be premenopausal.  Hence she was started on tamoxifen for which she has been taking gabapentin for hot flashes.  Still having a lot of dermatographia is.  Has a hiatal hernia as well as a hearing loss since that time.  She came to me for the first time 10/9/18 asking me to assume her care.  She was concerned regarding some hip pain for which she had been told in the past there was some tumor in the abdomen and seeing orthopedics for was told she did not need to do anything about it.  She also had hip injections for spinal stenosis with neurology and movable.  I asked her to get her prior bone imaging reports and discs and I'm getting a total body bone scan.  She had previous genetic testing August 2018 negative for BRCA and she is going to get those reports for rest of review what genetic test were done.  Assuming the bone scan is unremarkable and the outside imaging does not suggest any other worrisome bony abnormalities and I certainly think at least 5 years of tamoxifen is reasonable.  She will continue with the gabapentin.     Per records from Dr. Dr. Tao, she completed radiation to the left breast 7/30/18.  Because  of shortness of breath with pleurisy, CT PE protocol 5/8/18 was done and no PE found.  ENT evaluation 5/17/18 with flexible laryngoscopy showed widely patent airway and prednisone was tapered post hypersensitivity reaction to the Taxotere.  Per note from . Dr. Tao of 8/27/18, BRCA1 and BRCA2 mutation analysis was ordered.  This was done with [employer] which showed no clinically significant mutation but there was a variant of uncertain significance found.  I recommended repeating this with cancernext panel for more thorough evaluation.  According to a note from her primary care in 2015 there was mention of an MRI showing no change in a thigh lipoma which I suspect is the mass she is referring to.  I performed total body bone scan 10/23/18 which was entirely negative.  She does have a large mass in the superior lateral aspect of her left thigh which has not grown and likely lipomatous but given the size of this, I am referring her to Bob Parham for evaluation.  She brought her discs with her on her visit to see me on 11/13/18 and she will take those to Dr. Parham and then bring them back to me for us to load into our system for the future.  In the meantime I will get her to our genetic counselors for further testing and to have her monitored over time in the event that her variant of undetermined significance becomes one that is determined to be significant.  She is due for her mammogram 11/14/18 and she will see my nurse practitioner back in about 6 weeks and hopefully in the meantime we will have some answers from Dr. Parham and we will see where the genetic counseling stands.  Her CBC and CMP were essentially unremarkable save for slightly elevated glucose.        Malignant neoplasm of upper-outer quadrant of left breast in female, estrogen receptor positive (CMS/HCC)    2/6/2018 Initial Diagnosis     Malignant neoplasm of upper-outer quadrant of breast in female, estrogen receptor positive  (CMS/HCC)          Genetic Testing     Genetic testing Results were obtained from [employer] confirming that a 28 gene [facility] panel had been performed, which included all high and moderate risk breast cancer genes as well as genes related to other cancer risks.  Two variants of uncertain significance (VUS) were identified, in the BARD1 and PMS2 genes.  The ClinVar database was referenced, which did not show any alternative interpretations of either VUS.              10/23/2018 Imaging     Total body bone scan         1/11/2019 Imaging     Right mammogram BI-RADS II             1/17/2019 Imaging     MRI right gluteal region showed fatty tumor posteriorly in the fascia varun             Mass of left thigh    3/23/2015 Initial Diagnosis     Mass of left thigh         3/23/2015 Imaging     MRI left lower extremity Impression:  Abnormal left tensor fascial des muscle of uncertain significance.  That signal may represent a lipoma but is more infiltrative and unusual.  Other neoplastic process including liposarcoma cannot be excluded.  Mild contrast enhancement and air present muscle injury, muscle inflammation or less likely, neoplastic involvement.            HISTORY OF PRESENT ILLNESS:  The patient is a 48 y.o. female, here for follow up on management of breast cancer.  Left thigh mass still present and fatty on MRI      Past Medical History:   Diagnosis Date   • Breast cancer (CMS/HCC) 2018   • Diabetes mellitus (CMS/HCC)    • Disease of thyroid gland      Past Surgical History:   Procedure Laterality Date   • BREAST LUMPECTOMY Left 02/2018   • KNEE ARTHROSCOPY  2004       Allergies   Allergen Reactions   • Codeine GI Intolerance   • Morphine GI Intolerance   • Taxotere [Docetaxel] Hives   • Cytoxan [Cyclophosphamide] Hives   • Zyrtec [Cetirizine] Rash       Family History and Social History reviewed and changed as necessary      REVIEW OF SYSTEM:   Review of Systems   Constitutional: Negative for appetite  "change, chills, diaphoresis, fatigue, fever and unexpected weight change.   HENT:   Negative for mouth sores, sore throat and trouble swallowing.    Eyes: Negative for icterus.   Respiratory: Negative for cough, hemoptysis and shortness of breath.    Cardiovascular: Negative for chest pain, leg swelling and palpitations.   Gastrointestinal: Negative for abdominal distention, abdominal pain, blood in stool, constipation, diarrhea, nausea and vomiting.   Endocrine: Negative for hot flashes.   Genitourinary: Negative for bladder incontinence, difficulty urinating, dysuria, frequency and hematuria.    Musculoskeletal: Negative for gait problem, neck pain and neck stiffness.   Skin: Negative for rash.   Neurological: Negative for dizziness, gait problem, headaches, light-headedness and numbness.   Hematological: Negative for adenopathy. Does not bruise/bleed easily.   Psychiatric/Behavioral: Negative for depression. The patient is not nervous/anxious.    All other systems reviewed and are negative.       PHYSICAL EXAM    Vitals:    01/22/19 0918   BP: 124/81   Pulse: 97   Resp: 18   Temp: 98.3 °F (36.8 °C)   Weight: 78.9 kg (174 lb)   Height: 167.6 cm (66\")     Constitutional: Appears well-developed and well-nourished. No distress.   ECOG: (1) Restricted in physically strenuous activity, ambulatory and able to do work of light nature  HENT:   Head: Normocephalic.   Mouth/Throat: Oropharynx is clear and moist.   Eyes: Conjunctivae are normal. Pupils are equal, round, and reactive to light. No scleral icterus.   Neck: Neck supple. No JVD present. No thyromegaly present.   Cardiovascular: Normal rate, regular rhythm and normal heart sounds.    Pulmonary/Chest: Breath sounds normal. No respiratory distress.   Abdominal: Soft. Exhibits no distension and no mass. There is no hepatosplenomegaly. There is no tenderness. There is no rebound and no guarding.   Musculoskeletal:Exhibits no edema, tenderness or deformity. "   Neurological: Alert and oriented to person, place, and time. Exhibits normal muscle tone.   Skin: No ecchymosis, no petechiae and no rash noted. Not diaphoretic. No cyanosis. Nails show no clubbing.   Psychiatric: Normal mood and affect.   Vitals reviewed.  Breasts: Left breast post radiation changes without peau d'orange and postoperative changes stable.  Right breast benign    Lab Results   Component Value Date    HGB 14.9 10/09/2018    HCT 45.2 10/09/2018    MCV 83 10/09/2018     10/09/2018    WBC 6.9 10/09/2018    NEUTROABS 4.5 10/09/2018    LYMPHSABS 1.9 10/09/2018    MONOSABS 0.3 10/09/2018    EOSABS 0.2 10/09/2018    BASOSABS 0.0 10/09/2018       Lab Results   Component Value Date    BUN 14 10/09/2018    CREATININE 0.71 10/09/2018     10/09/2018    K 4.5 10/09/2018     10/09/2018    CO2 19 (L) 10/09/2018    CALCIUM 10.0 10/09/2018    ALBUMIN 4.6 10/09/2018    BILITOT <0.2 10/09/2018    ALKPHOS 63 10/09/2018    AST 20 10/09/2018    ALT 18 10/09/2018                   ASSESSMENT & PLAN:    1.  Adjuvant therapy breast cancer: We will get at least 5 years of tamoxifen.  We will follow her liver enzymes annually.  She will need follow-up left mammogram in May.  Next right mammogram January 2020.  We'll have her follow up with my nurse practitioner after the next mammogram.  2. Lipoma: I communicated with Dr. Parham and we will get an opinion from him on this  3. Leg/flank pain: This may well be coming from other sources other than this lipoma and is getting injections for this.     Discussed with patient 30 minutes greater than 50% spent counseling  Derrick Terry MD    01/22/2019

## 2019-06-19 ENCOUNTER — OFFICE VISIT (OUTPATIENT)
Dept: ONCOLOGY | Facility: CLINIC | Age: 49
End: 2019-06-19

## 2019-06-19 VITALS
TEMPERATURE: 98.4 F | RESPIRATION RATE: 18 BRPM | HEIGHT: 66 IN | HEART RATE: 94 BPM | BODY MASS INDEX: 27.64 KG/M2 | WEIGHT: 172 LBS | SYSTOLIC BLOOD PRESSURE: 123 MMHG | DIASTOLIC BLOOD PRESSURE: 82 MMHG

## 2019-06-19 DIAGNOSIS — T45.1X5A HOT FLASHES DUE TO TAMOXIFEN: ICD-10-CM

## 2019-06-19 DIAGNOSIS — C50.412 MALIGNANT NEOPLASM OF UPPER-OUTER QUADRANT OF LEFT BREAST IN FEMALE, ESTROGEN RECEPTOR POSITIVE (HCC): Primary | ICD-10-CM

## 2019-06-19 DIAGNOSIS — Z17.0 MALIGNANT NEOPLASM OF UPPER-OUTER QUADRANT OF LEFT BREAST IN FEMALE, ESTROGEN RECEPTOR POSITIVE (HCC): Primary | ICD-10-CM

## 2019-06-19 DIAGNOSIS — R23.2 HOT FLASHES DUE TO TAMOXIFEN: ICD-10-CM

## 2019-06-19 PROBLEM — D17.24 LIPOMA OF LEFT LOWER EXTREMITY: Status: ACTIVE | Noted: 2019-01-10

## 2019-06-19 PROCEDURE — 99215 OFFICE O/P EST HI 40 MIN: CPT | Performed by: NURSE PRACTITIONER

## 2019-06-19 RX ORDER — LORATADINE 10 MG/1
10 TABLET ORAL DAILY
COMMUNITY
End: 2020-01-22 | Stop reason: ALTCHOICE

## 2019-06-19 RX ORDER — ALBUTEROL SULFATE 90 UG/1
AEROSOL, METERED RESPIRATORY (INHALATION)
Refills: 3 | COMMUNITY
Start: 2019-04-17 | End: 2021-07-28 | Stop reason: ALTCHOICE

## 2019-06-19 RX ORDER — MONTELUKAST SODIUM 10 MG/1
TABLET ORAL
COMMUNITY
Start: 2019-05-15 | End: 2020-01-22 | Stop reason: ALTCHOICE

## 2019-06-19 RX ORDER — FEXOFENADINE HCL 180 MG/1
180 TABLET ORAL DAILY
COMMUNITY
End: 2022-04-21

## 2019-06-19 RX ORDER — RANITIDINE 300 MG/1
CAPSULE ORAL
Refills: 1 | COMMUNITY
Start: 2019-04-01 | End: 2020-01-22 | Stop reason: ALTCHOICE

## 2019-06-19 NOTE — PROGRESS NOTES
CHIEF COMPLAINT: 1.  Severe hot flashes  2.  Follow-up breast cancer    Problem List:  Oncology/Hematology History    1. Left breast cancer: She had breast cancer biopsied 2/6/18 by Dr. marquez showing infiltrating ductal carcinoma [address] 6 out of 9, 60% 1+ HER-2/magaly, ER greater than 95% 2+, SC 95% 2-3+.  On 3/13/18 she had lumpectomy for a 2.5 cm 0 out of one grade 2 infiltrating lobular carcinoma.  Mammaprint came back with a 10% distant risk of recurrence on hormonal blockade alone.  Because of the tumor over 2 cm in size, Dr. Tao recommended Taxotere and Cytoxan but after the second course of therapy she had a severe allergic reaction with hives.  She received 30 radiation treatments, completed 07/2018 and, given a history of DEANA without BSO for fibroids in 2012, Dr. Toya Carrillo due to her hormonal levels and found her to be premenopausal.  Hence she was started on tamoxifen for which she has been taking gabapentin for hot flashes.  Still having a lot of dermatographia is.  Has a hiatal hernia as well as a hearing loss since that time.  She came to me for the first time 10/9/18 asking me to assume her care.  She was concerned regarding some hip pain for which she had been told in the past there was some tumor in the abdomen and seeing orthopedics for was told she did not need to do anything about it.  She also had hip injections for spinal stenosis with neurology and movable.  I asked her to get her prior bone imaging reports and discs and I'm getting a total body bone scan.  She had previous genetic testing August 2018 negative for BRCA and she is going to get those reports for rest of review what genetic test were done.  Assuming the bone scan is unremarkable and the outside imaging does not suggest any other worrisome bony abnormalities and I certainly think at least 5 years of tamoxifen is reasonable.  She will continue with the gabapentin.     Per records from Dr. Dr. Tao, she  completed radiation to the left breast 7/30/18.  Because of shortness of breath with pleurisy, CT PE protocol 5/8/18 was done and no PE found.  ENT evaluation 5/17/18 with flexible laryngoscopy showed widely patent airway and prednisone was tapered post hypersensitivity reaction to the Taxotere.  Per note from . Dr. Tao of 8/27/18, BRCA1 and BRCA2 mutation analysis was ordered.  This was done with [employer] which showed no clinically significant mutation but there was a variant of uncertain significance found.  I recommended repeating this with cancernext panel for more thorough evaluation.  According to a note from her primary care in 2015 there was mention of an MRI showing no change in a thigh lipoma which I suspect is the mass she is referring to.  I performed total body bone scan 10/23/18 which was entirely negative.  She does have a large mass in the superior lateral aspect of her left thigh which has not grown and likely lipomatous but given the size of this, I am referring her to Bob Parham for evaluation.  She brought her discs with her on her visit to see me on 11/13/18 and she will take those to Dr. Parham and then bring them back to me for us to load into our system for the future.  In the meantime I will get her to our genetic counselors for further testing and to have her monitored over time in the event that her variant of undetermined significance becomes one that is determined to be significant.  She is due for her mammogram 11/14/18 and she will see my nurse practitioner back in about 6 weeks and hopefully in the meantime we will have some answers from Dr. Parham and we will see where the genetic counseling stands.  Her CBC and CMP were essentially unremarkable save for slightly elevated glucose.        Malignant neoplasm of upper-outer quadrant of left breast in female, estrogen receptor positive (CMS/HCC)    2/6/2018 Initial Diagnosis     Malignant neoplasm of upper-outer quadrant  of breast in female, estrogen receptor positive (CMS/HCC)         7/2/2018 -  Hormonal Therapy     Tamoxifen began after radiation         10/23/2018 Imaging     Total body bone scan         12/7/2018 Genetic Testing     Genetic testing Results were obtained from [employer] confirming that a 28 gene [employer] panel had been performed, which included all high and moderate risk breast cancer genes as well as genes related to other cancer risks.  Two variants of uncertain significance (VUS) were identified, in the BARD1 and PMS2 genes.  The ClinVar database was referenced, which did not show any alternative interpretations of either VUS.              1/11/2019 Imaging     Right mammogram BI-RADS II             1/17/2019 Imaging     MRI right gluteal region showed fatty tumor posteriorly in the fascia varun             Lipoma of left lower extremity    3/23/2015 Initial Diagnosis     Mass of left thigh         3/23/2015 Imaging     MRI left lower extremity Impression:  Abnormal left tensor fascial des muscle of uncertain significance.  That signal may represent a lipoma but is more infiltrative and unusual.  Other neoplastic process including liposarcoma cannot be excluded.  Mild contrast enhancement and air present muscle injury, muscle inflammation or less likely, neoplastic involvement.         12/7/2018 Genetic Testing                1/17/2019 Imaging     MRI right gluteal region showed fatty tumor posteriorly in the fascia varun         2/4/2019 -  Other Event     Consultation with Dr. Bob Parham at the [facility] for left thigh lipoma,  recommended physical therapy for her snapping hip and trocar bursitis, in regard to the tumor no plans on surgically resecting.  Follow-up PRN.            HISTORY OF PRESENT ILLNESS:  The patient is a 49 y.o. female, here for follow up on management of breast cancer.  On tamoxifen she states that she has severe hot flashes, she states that times that she can  have 8 of them per hour.  They are interfering with her daily life and sleep.  She is already on gabapentin 3-4 times a day, she has been on Effexor since January with no relief.  Current left mammogram 5/28/2019 was negative, BI-RADS 2.  She is due for bilateral mammogram in December.  Has no other current complaints.  Has an appointment with her gynecologist tomorrow.    Past Medical History:   Diagnosis Date   • Breast cancer (CMS/[facility]) 2018   • Diabetes mellitus (CMS/HCC)    • Disease of thyroid gland      Past Surgical History:   Procedure Laterality Date   • BREAST LUMPECTOMY Left 02/2018   • KNEE ARTHROSCOPY  2004       Allergies   Allergen Reactions   • Codeine GI Intolerance   • Morphine GI Intolerance   • Taxotere [Docetaxel] Hives   • Cytoxan [Cyclophosphamide] Hives   • Zyrtec [Cetirizine] Rash       Family History and Social History reviewed and changed as necessary      REVIEW OF SYSTEM:   Review of Systems   Constitutional: Negative for appetite change, chills, diaphoresis, fatigue, fever and unexpected weight change.   HENT:   Negative for mouth sores, sore throat and trouble swallowing.    Eyes: Negative for icterus.   Respiratory: Negative for cough, hemoptysis and shortness of breath.    Cardiovascular: Negative for chest pain, leg swelling and palpitations.   Gastrointestinal: Negative for abdominal distention, abdominal pain, blood in stool, constipation, diarrhea, nausea and vomiting.   Endocrine: Positive for hot flashes.   Genitourinary: Negative for bladder incontinence, difficulty urinating, dysuria, frequency and hematuria.    Musculoskeletal: Negative for gait problem, neck pain and neck stiffness.   Skin: Negative for rash.   Neurological: Negative for dizziness, gait problem, headaches, light-headedness and numbness.   Hematological: Negative for adenopathy. Does not bruise/bleed easily.   Psychiatric/Behavioral: Negative for depression. The patient is not nervous/anxious.    All other  "systems reviewed and are negative.       PHYSICAL EXAM    Vitals:    06/19/19 1106   BP: 123/82   Pulse: 94   Resp: 18   Temp: 98.4 °F (36.9 °C)   Weight: 78 kg (172 lb)   Height: 167.6 cm (66\")     Constitutional: Appears well-developed and well-nourished. No distress.   ECOG: (1) Restricted in physically strenuous activity, ambulatory and able to do work of light nature  HENT:   Head: Normocephalic.   Mouth/Throat: Oropharynx is clear and moist.   Eyes: Conjunctivae are normal. Pupils are equal, round, and reactive. No scleral icterus.   Neck: Neck supple. No JVD present.    Cardiovascular: Normal rate, regular rhythm and normal heart sounds.    Pulmonary/Chest: Breath sounds normal. No respiratory distress.   Nodes: No cervical, supraclavicular or axillary nodes palpable on exam.  Breast: Deferred  Abdominal: Soft. Exhibits no distension. There is no tenderness.   Musculoskeletal:Exhibits no edema, tenderness or deformity.   Neurological: Alert and oriented to person, place, and time. Exhibits normal muscle tone.   Skin: No ecchymosis, no petechiae and no rash noted. Not diaphoretic. No cyanosis.    Psychiatric: Normal mood and affect.   Vitals reviewed.        ASSESSMENT & PLAN:    1.  Adjuvant therapy breast cancer: No evidence of disease on clinical exam and no new worrisome symptoms.  Current left mammogram was negative, BI-RADS 2.  Would like to get at least 5 years of tamoxifen, I am not sure this will be possible with the severe hot flashes she is experiencing.  She has been on the tamoxifen for almost 1 year now, she started July 2018 when she completed radiation.  We will follow her liver enzymes annually last ones in October were normal.  She will need follow-up bilateral mammogram in December and I have ordered that today.   2. Severe hot flashes due to tamoxifen: I had a lengthy discussion with the patient regarding her hot flashes.  She has been on Effexor since January and cannot really tell any " difference.  She is going to wean off of Effexor as she requested to stop as it is not helping, over the next 3 weeks.  She will continue gabapentin, she is taking it 3 times a day and states that she can go up to 4 times a day per her prescription.  She will go to 4 times a day once she stops her Effexor.  We also discussed doing a trial off of tamoxifen for 1-2 weeks to see if the hot flashes improved and then restarting, she will consider this after she sees how she does on the increased dose of gabapentin.  We also discussed acupuncture, I have given her the names of 2 acupuncturist in [place] to contact if she is interested, data is limited on efficacy of relief with hot flashes and we discussed that.  We also discussed switching to Lupron and aromatase inhibitor as she has discussed this apparently in some of her online chat groups, I discussed that potentially side effects could be the same with hot flashes, joint pain etc.  She states that she already has joint pain and will stay with tamoxifen for now.  3. Lipoma: She had consult with Dr. Parham as outlined above in the oncology history.  No plans for further follow-up in the absence of new symptoms.    I will see her back in 6 months for follow-up.    I spent 45 minutes with the patient. I spent > 50% percent of this time counseling and discussing prognosis, diagnostic testing, evaluation, current status and management.    Orly Masterson, APRN    06/19/2019

## 2019-07-17 ENCOUNTER — TELEPHONE (OUTPATIENT)
Dept: ONCOLOGY | Facility: CLINIC | Age: 49
End: 2019-07-17

## 2019-07-17 NOTE — TELEPHONE ENCOUNTER
I returned Mrs. Bolden's call and spoke with her regarding the above.  She is seeing dermatologist for skin lesions and will continue to follow with dentist and periodontist regarding oral issues.  Nothing to add from our standpoint.

## 2019-07-17 NOTE — TELEPHONE ENCOUNTER
Pt called and reported that she is having multiple teeth extracted tomorrow due to looseness. She reports that multiple forums she is on state this is likely due to her treatment. She also reports bug bites which become red and and open and are not healing well. Discussed with NERI Callahan and pt was encouraged to notify dentist tomorrow that she is on Tamoxifen and that if her bug bites continue to cause issues for her, she should speak with her PCP. She requested to speak directly to April since April knows her. Message sent to April.

## 2019-07-23 RX ORDER — VENLAFAXINE HYDROCHLORIDE 75 MG/1
CAPSULE, EXTENDED RELEASE ORAL
Qty: 30 CAPSULE | Refills: 5 | Status: SHIPPED | OUTPATIENT
Start: 2019-07-23 | End: 2020-11-03 | Stop reason: ALTCHOICE

## 2019-10-21 RX ORDER — TAMOXIFEN CITRATE 20 MG/1
20 TABLET ORAL DAILY
Qty: 30 TABLET | Refills: 3 | Status: SHIPPED | OUTPATIENT
Start: 2019-10-21 | End: 2020-03-10

## 2020-01-22 ENCOUNTER — OFFICE VISIT (OUTPATIENT)
Dept: ONCOLOGY | Facility: CLINIC | Age: 50
End: 2020-01-22

## 2020-01-22 VITALS
HEART RATE: 90 BPM | DIASTOLIC BLOOD PRESSURE: 88 MMHG | RESPIRATION RATE: 18 BRPM | WEIGHT: 171 LBS | TEMPERATURE: 98 F | SYSTOLIC BLOOD PRESSURE: 128 MMHG | BODY MASS INDEX: 27.48 KG/M2 | HEIGHT: 66 IN

## 2020-01-22 DIAGNOSIS — Z17.0 MALIGNANT NEOPLASM OF UPPER-OUTER QUADRANT OF LEFT BREAST IN FEMALE, ESTROGEN RECEPTOR POSITIVE (HCC): Primary | ICD-10-CM

## 2020-01-22 DIAGNOSIS — C50.412 MALIGNANT NEOPLASM OF UPPER-OUTER QUADRANT OF LEFT BREAST IN FEMALE, ESTROGEN RECEPTOR POSITIVE (HCC): Primary | ICD-10-CM

## 2020-01-22 DIAGNOSIS — T45.1X5A HOT FLASHES DUE TO TAMOXIFEN: ICD-10-CM

## 2020-01-22 DIAGNOSIS — R07.81 RIB PAIN ON LEFT SIDE: ICD-10-CM

## 2020-01-22 DIAGNOSIS — R23.2 HOT FLASHES DUE TO TAMOXIFEN: ICD-10-CM

## 2020-01-22 PROCEDURE — 99215 OFFICE O/P EST HI 40 MIN: CPT | Performed by: NURSE PRACTITIONER

## 2020-01-22 RX ORDER — GABAPENTIN 400 MG/1
CAPSULE ORAL
COMMUNITY
Start: 2020-01-06 | End: 2021-07-28 | Stop reason: DRUGHIGH

## 2020-01-22 RX ORDER — PANTOPRAZOLE SODIUM 40 MG/1
TABLET, DELAYED RELEASE ORAL
COMMUNITY
Start: 2019-11-04 | End: 2021-07-28 | Stop reason: ALTCHOICE

## 2020-01-22 NOTE — PROGRESS NOTES
"CHIEF COMPLAINT: 1.  Left sided rib pain  2.  Severe hot flashes and now \"cold flashes\"   3.  Follow-up breast cancer    Problem List:  Oncology/Hematology History    1. Left breast cancer: She had breast cancer biopsied 2/6/18 by Dr. marquez showing infiltrating ductal carcinoma [place] 6 out of 9, 60% 1+ HER-2/magaly, ER greater than 95% 2+, NC 95% 2-3+.  On 3/13/18 she had lumpectomy for a 2.5 cm 0 out of one grade 2 infiltrating lobular carcinoma.  Mammaprint came back with a 10% distant risk of recurrence on hormonal blockade alone.  Because of the tumor over 2 cm in size, Dr. Tao recommended Taxotere and Cytoxan but after the second course of therapy she had a severe allergic reaction with hives.  She received 30 radiation treatments, completed 07/2018 and, given a history of DEANA without BSO for fibroids in 2012, Dr. Toya Carrillo due to her hormonal levels and found her to be premenopausal.  Hence she was started on tamoxifen for which she has been taking gabapentin for hot flashes.  Still having a lot of dermatographia is.  Has a hiatal hernia as well as a hearing loss since that time.  She came to me for the first time 10/9/18 asking me to assume her care.  She was concerned regarding some hip pain for which she had been told in the past there was some tumor in the abdomen and seeing orthopedics for was told she did not need to do anything about it.  She also had hip injections for spinal stenosis with neurology and movable.  I asked her to get her prior bone imaging reports and discs and I'm getting a total body bone scan.  She had previous genetic testing August 2018 negative for BRCA and she is going to get those reports for rest of review what genetic test were done.  Assuming the bone scan is unremarkable and the outside imaging does not suggest any other worrisome bony abnormalities and I certainly think at least 5 years of tamoxifen is reasonable.  She will continue with the gabapentin. "     Per records from Dr. Dr. Tao, she completed radiation to the left breast 7/30/18.  Because of shortness of breath with pleurisy, CT PE protocol 5/8/18 was done and no PE found.  ENT evaluation 5/17/18 with flexible laryngoscopy showed widely patent airway and prednisone was tapered post hypersensitivity reaction to the Taxotere.  Per note from . Dr. Tao of 8/27/18, BRCA1 and BRCA2 mutation analysis was ordered.  This was done with [employer] which showed no clinically significant mutation but there was a variant of uncertain significance found.  I recommended repeating this with cancernext panel for more thorough evaluation.  According to a note from her primary care in 2015 there was mention of an MRI showing no change in a thigh lipoma which I suspect is the mass she is referring to.  I performed total body bone scan 10/23/18 which was entirely negative.  She does have a large mass in the superior lateral aspect of her left thigh which has not grown and likely lipomatous but given the size of this, I am referring her to Bob Parham for evaluation.  She brought her discs with her on her visit to see me on 11/13/18 and she will take those to Dr. Parham and then bring them back to me for us to load into our system for the future.  In the meantime I will get her to our genetic counselors for further testing and to have her monitored over time in the event that her variant of undetermined significance becomes one that is determined to be significant.  She is due for her mammogram 11/14/18 and she will see my nurse practitioner back in about 6 weeks and hopefully in the meantime we will have some answers from Dr. Parham and we will see where the genetic counseling stands.  Her CBC and CMP were essentially unremarkable save for slightly elevated glucose.        Malignant neoplasm of upper-outer quadrant of left breast in female, estrogen receptor positive (CMS/HCC)    2/6/2018 Initial Diagnosis     " Malignant neoplasm of upper-outer quadrant of breast in female, estrogen receptor positive (CMS/HCC)      7/2/2018 -  Hormonal Therapy     Tamoxifen began after radiation      10/23/2018 Imaging     Total body bone scan      12/7/2018 Genetic Testing     Genetic testing Results were obtained from [employer] confirming that a 28 gene [employer] panel had been performed, which included all high and moderate risk breast cancer genes as well as genes related to other cancer risks.  Two variants of uncertain significance (VUS) were identified, in the BARD1 and PMS2 genes.  The ClinVar database was referenced, which did not show any alternative interpretations of either VUS.             Lipoma of left lower extremity    3/23/2015 Initial Diagnosis     Mass of left thigh      3/23/2015 Imaging     MRI left lower extremity Impression:  Abnormal left tensor fascial des muscle of uncertain significance.  That signal may represent a lipoma but is more infiltrative and unusual.  Other neoplastic process including liposarcoma cannot be excluded.  Mild contrast enhancement and air present muscle injury, muscle inflammation or less likely, neoplastic involvement.      12/7/2018 Genetic Testing             1/17/2019 Imaging     MRI right gluteal region showed fatty tumor posteriorly in the fascia varun      2/4/2019 -  Other Event     Consultation with Dr. Bob Parham at the [facility] for left thigh lipoma,  recommended physical therapy for her snapping hip and trocar bursitis, in regard to the tumor no plans on surgically resecting.  Follow-up PRN.         HISTORY OF PRESENT ILLNESS:  The patient is a 49 y.o. female, here for follow up on management of breast cancer.  She continues to complain of severe hot flashes on tamoxifen and feels they are getting worse.  She also reports a sensation of having \"cold flashes\" now with intermittent periods of feeling cold particularly in her hands and feet, it prompts " her to have to go sit in warm water to get comfortable, this has been going on for about the past 3 weeks.  No fevers, no signs of infection.  She takes Effexor 75 mg daily and gabapentin 400 mg for her hot flashes without great relief.  She has not noted any change in the color of her skin of her hands when she is exposed to cold.  She also reports left-sided rib pain that has been going on for about the past 3 weeks.  She states the pain is constant, it is worse when she lies on that side or presses on her left ribs.  She has no associated shortness of breath or cough.  It is worse with certain positions.  Current bilateral diagnostic mammogram 1/14/2020 was negative, BI-RADS 2.  She will be due for short-term 6-month follow-up left mammogram in July.  She is up-to-date on routine gynecological exam having had her last one in November with Dr. Carrillo.  Dr. Carrillo has since retired therefore she states she will be looking for a new gynecologist.      Past Medical History:   Diagnosis Date   • Breast cancer (CMS/HCC) 2018   • Diabetes mellitus (CMS/HCC)    • Disease of thyroid gland      Past Surgical History:   Procedure Laterality Date   • BREAST LUMPECTOMY Left 02/2018   • KNEE ARTHROSCOPY  2004       Allergies   Allergen Reactions   • Codeine GI Intolerance   • Morphine GI Intolerance   • Taxotere [Docetaxel] Hives   • Cytoxan [Cyclophosphamide] Hives   • Zyrtec [Cetirizine] Rash       Family History and Social History reviewed and changed as necessary      REVIEW OF SYSTEM:   Review of Systems   Constitutional: Negative for appetite change, chills, diaphoresis, fever and unexpected weight change. Positive for mild fatigue.  HENT:   Negative for mouth sores, sore throat and trouble swallowing.    Eyes: Negative for icterus.   Respiratory: Negative for cough, hemoptysis and shortness of breath.    Cardiovascular: Negative for chest pain, leg swelling and palpitations.   Gastrointestinal: Negative for abdominal  "distention, abdominal pain, blood in stool, constipation, diarrhea, nausea and vomiting.   Endocrine: Positive for hot flashes and feeling cold intermittently.   Genitourinary: Negative for bladder incontinence, difficulty urinating, dysuria, frequency and hematuria.    Musculoskeletal: Negative for gait problem, neck pain and neck stiffness. Positive for left rib pain, constant.  Skin: Negative for rash.   Neurological: Negative for dizziness, gait problem, headaches, light-headedness and numbness.   Hematological: Negative for adenopathy. Does not bruise/bleed easily.   Psychiatric/Behavioral: Negative for depression. The patient is not nervous/anxious.    All other systems reviewed and are negative.       PHYSICAL EXAM    Vitals:    01/22/20 0953   BP: 128/88   Pulse: 90   Resp: 18   Temp: 98 °F (36.7 °C)   Weight: 77.6 kg (171 lb)   Height: 167.6 cm (66\")     Constitutional: Appears well-developed and well-nourished. No distress.   ECOG: (1) Restricted in physically strenuous activity, ambulatory and able to do work of light nature  HENT:   Head: Normocephalic.   Mouth/Throat: Oropharynx is clear and moist.   Eyes: Conjunctivae are normal. Pupils are equal, round, and reactive. No scleral icterus.   Neck: Neck supple. No JVD present.    Cardiovascular: Normal rate, regular rhythm and normal heart sounds.    Pulmonary/Chest: Breath sounds normal. No respiratory distress.   Nodes: No cervical, supraclavicular or axillary nodes palpable on exam.  Breast: Bilateral breast exam is benign, bilateral breasts are pendulous, no abnormal masses or nodules, skin is smooth with no dimpling, nipples are without retraction or discharge.  Stable lumpectomy scar bed in the left breast upper outer quadrant.  Abdominal: Soft. Exhibits no distension. There is no tenderness.   Musculoskeletal:Exhibits no edema or deformity. Positive for tenderness left lateral ribs.  Neurological: Alert and oriented to person, place, and time. " Exhibits normal muscle tone.   Skin: No ecchymosis, no petechiae and no rash noted. Not diaphoretic. No cyanosis.    Psychiatric: Normal mood and affect.   Vitals reviewed.      ASSESSMENT & PLAN:    1. ER positive stage Ia left breast cancer, currently on adjuvant therapy with tamoxifen  2. Left rib pain  Discussion:  No evidence of disease on clinical exam however she is having persistent left rib pain for the past 3 weeks.  The pain is worse in certain positions such as lying on that side or twisting.  She has no associated cough or pulmonary symptoms.  I will get a total body bone scan for further evaluation.  Current bilateral diagnostic mammogram 1/14/2020 was negative, BI-RADS 2.  She will be due for short-term 6-month follow-up left mammogram in July and I have ordered that today.  She had questions regarding additional testing in light of her notification of dense breast tissue on mammography.  I discussed with her that she is getting mammograms with tomosynthesis.  We discussed MRI and ultrasound and the benefits of these tests but also downsides that while they may show more findings not seen on mammogram they also can show more findings that are not cancer.  Currently she is satisfied with doing mammography, we are still doing every 6 months on the left status post lumpectomy and annual on the right.  We would like to get at least 5 years of tamoxifen, I am not sure this will be possible with the severe hot flashes she is experiencing.  She has been on the tamoxifen since July 2018 when she completed radiation.  She is questioning what the percentage is of recurrence if she stops tamoxifen.    3. Severe hot flashes due to tamoxifen: In addition to hot flashes she now reports that she has an unusual sensation of being cold particularly her hands and feet.  She states that this happens randomly, typically she has to go sit in a warm bath to feel better.  She has no signs of infection, she has not had any  fevers.  She has not noted any change in the color of her skin when exposed to cold.  This has been occurring for about the past 3 weeks.  She is on Effexor 75 mg daily along with gabapentin but neither 1 of these seems to have made much of a difference in her hot flashes that she feels are getting worse.  We had previously discussed acupuncture, and I had given her the names of 2 acupuncturist in [place] to contact if she is interested, data is limited on efficacy of relief with hot flashes and we discussed that.  She has never looked into this.  She asked me about splitting of her tamoxifen dose into 10 mg twice a day rather than 20 mg once a day.  I checked with our pharmacist, Krystal Rojas, Pharm.D, there is no evidence that this will be helpful in relieving her hot flashes however if she wants to try it we can consider in the future, our pharmacist states that there should be no difference in efficacy of tamoxifen if the dose is split.  She is not sure she wants to stay on tamoxifen much longer therefore I will leave this discussion to her when she follows up with Dr. Terry.    4.  Dental issues and hearing loss: Since we saw the patient last she has had to have a teeth pulled (she states one tooth literally just fell out on its own), she also states she now wears hearing aids.  She is on a chat group online with breast cancer survivors and is concerned that these issues are related to her previous therapy treatment.  She received 2 courses of Taxotere and Cytoxan.  I told her that I am not aware of either of these issues with teeth falling out or hearing loss as being common with these 2 medications.    5.  Lipoma: She had consult with Dr. Parham as outlined above in the oncology history.  No plans for further follow-up in the absence of new symptoms.    We will see her back in a couple of weeks to go over her bone scan, I will have her see Dr. Terry on return at her request.    I spent a total of  40 minutes in direct patient care, greater than 30  minutes (greater than 50%) were spent in coordination of care, and counseling the patient regarding  (diagnosis) . Answered any questions patient had regarding medications and plan of care.     Orly Masterson, APRN    01/22/2020

## 2020-01-24 ENCOUNTER — HOSPITAL ENCOUNTER (OUTPATIENT)
Dept: NUCLEAR MEDICINE | Facility: HOSPITAL | Age: 50
Discharge: HOME | End: 2020-01-24

## 2020-01-24 DIAGNOSIS — C50.412 MALIGNANT NEOPLASM OF UPPER-OUTER QUADRANT OF LEFT BREAST IN FEMALE, ESTROGEN RECEPTOR POSITIVE (HCC): ICD-10-CM

## 2020-01-24 DIAGNOSIS — R07.81 RIB PAIN ON LEFT SIDE: ICD-10-CM

## 2020-01-24 DIAGNOSIS — Z17.0 MALIGNANT NEOPLASM OF UPPER-OUTER QUADRANT OF LEFT BREAST IN FEMALE, ESTROGEN RECEPTOR POSITIVE (HCC): ICD-10-CM

## 2020-01-24 PROCEDURE — 0 TECHNETIUM MEDRONATE KIT: Performed by: NURSE PRACTITIONER

## 2020-01-24 PROCEDURE — 78306 BONE IMAGING WHOLE BODY: CPT

## 2020-01-24 PROCEDURE — A9503 TC99M MEDRONATE: HCPCS | Performed by: NURSE PRACTITIONER

## 2020-01-24 RX ORDER — TC 99M MEDRONATE 20 MG/10ML
26.6 INJECTION, POWDER, LYOPHILIZED, FOR SOLUTION INTRAVENOUS
Status: COMPLETED | OUTPATIENT
Start: 2020-01-24 | End: 2020-01-24

## 2020-01-24 RX ADMIN — Medication 26.6 MILLICURIE: at 10:00

## 2020-02-04 ENCOUNTER — OFFICE VISIT (OUTPATIENT)
Dept: ONCOLOGY | Facility: CLINIC | Age: 50
End: 2020-02-04

## 2020-02-04 VITALS
HEART RATE: 87 BPM | TEMPERATURE: 99.1 F | SYSTOLIC BLOOD PRESSURE: 131 MMHG | WEIGHT: 170 LBS | HEIGHT: 66 IN | BODY MASS INDEX: 27.32 KG/M2 | RESPIRATION RATE: 18 BRPM | DIASTOLIC BLOOD PRESSURE: 83 MMHG

## 2020-02-04 DIAGNOSIS — Z17.0 MALIGNANT NEOPLASM OF UPPER-OUTER QUADRANT OF LEFT BREAST IN FEMALE, ESTROGEN RECEPTOR POSITIVE (HCC): Primary | ICD-10-CM

## 2020-02-04 DIAGNOSIS — C50.412 MALIGNANT NEOPLASM OF UPPER-OUTER QUADRANT OF LEFT BREAST IN FEMALE, ESTROGEN RECEPTOR POSITIVE (HCC): Primary | ICD-10-CM

## 2020-02-04 PROCEDURE — 99214 OFFICE O/P EST MOD 30 MIN: CPT | Performed by: INTERNAL MEDICINE

## 2020-02-04 NOTE — PROGRESS NOTES
CHIEF COMPLAINT: Significant myalgias, joint aches, and severe hot flashes despite venlafaxine on tamoxifen with 3 menopausal hormone levels per her gynecologist before start of tamoxifen    Problem List:  Oncology/Hematology History    1. Left breast cancer: She had breast cancer biopsied 2/6/18 by Dr. marquez showing infiltrating ductal carcinoma [place] 6 out of 9, 60% 1+ HER-2/magaly, ER greater than 95% 2+, [place] 95% 2-3+.  On 3/13/18 she had lumpectomy for a 2.5 cm 0 out of one grade 2 infiltrating lobular carcinoma.  Mammaprint came back with a 10% distant risk of recurrence on hormonal blockade alone.  Because of the tumor over 2 cm in size, Dr. Tao recommended Taxotere and Cytoxan but after the second course of therapy she had a severe allergic reaction with hives.  She received 30 radiation treatments, completed 07/2018 and, given a history of DEANA without BSO for fibroids in 2012, Dr. Toya Carrillo due to her hormonal levels and found her to be premenopausal.  Hence she was started on tamoxifen for which she has been taking gabapentin for hot flashes.  Still having a lot of dermatographia is.  Has a hiatal hernia as well as a hearing loss since that time.  She came to me for the first time 10/9/18 asking me to assume her care.  She was concerned regarding some hip pain for which she had been told in the past there was some tumor in the abdomen and seeing orthopedics for was told she did not need to do anything about it.  She also had hip injections for spinal stenosis with neurology and movable.  I asked her to get her prior bone imaging reports and discs and I'm getting a total body bone scan.  She had previous genetic testing August 2018 negative for BRCA and she is going to get those reports for rest of review what genetic test were done.  Assuming the bone scan is unremarkable and the outside imaging does not suggest any other worrisome bony abnormalities and I certainly think at least 5 years  of tamoxifen is reasonable.  She will continue with the gabapentin.     Per records from Dr. Dr. Tao, she completed radiation to the left breast 7/30/18.  Because of shortness of breath with pleurisy, CT PE protocol 5/8/18 was done and no PE found.  ENT evaluation 5/17/18 with flexible laryngoscopy showed widely patent airway and prednisone was tapered post hypersensitivity reaction to the Taxotere.  Per note from . Dr. Tao of 8/27/18, BRCA1 and BRCA2 mutation analysis was ordered.  This was done with [facility] which showed no clinically significant mutation but there was a variant of uncertain significance found.  I recommended repeating this with cancernext panel for more thorough evaluation.  According to a note from her primary care in 2015 there was mention of an MRI showing no change in a thigh lipoma which I suspect is the mass she is referring to.  I performed total body bone scan 10/23/18 which was entirely negative.  She does have a large mass in the superior lateral aspect of her left thigh which has not grown and likely lipomatous but given the size of this, I am referring her to Bob Parham for evaluation.  She brought her discs with her on her visit to see me on 11/13/18 and she will take those to Dr. Parham and then bring them back to me for us to load into our system for the future.  In the meantime I will get her to our genetic counselors for further testing and to have her monitored over time in the event that her variant of undetermined significance becomes one that is determined to be significant.            Malignant neoplasm of upper-outer quadrant of left breast in female, estrogen receptor positive (CMS/HCC)    2/6/2018 Initial Diagnosis     Malignant neoplasm of upper-outer quadrant of breast in female, estrogen receptor positive (CMS/HCC)      7/2/2018 -  Hormonal Therapy     Tamoxifen began after radiation      10/23/2018 Imaging     Total body bone scan      12/7/2018  Genetic Testing     Genetic testing Results were obtained from [employer] confirming that a 28 gene [employer] panel had been performed, which included all high and moderate risk breast cancer genes as well as genes related to other cancer risks.  Two variants of uncertain significance (VUS) were identified, in the BARD1 and PMS2 genes.  The ClinVar database was referenced, which did not show any alternative interpretations of either VUS.           1/24/2020 Imaging      bone scan stable with no progressive abnormalities to explain left rib pain        Lipoma of left lower extremity    3/23/2015 Initial Diagnosis     Mass of left thigh      3/23/2015 Imaging     MRI left lower extremity Impression:  Abnormal left tensor fascial des muscle of uncertain significance.  That signal may represent a lipoma but is more infiltrative and unusual.  Other neoplastic process including liposarcoma cannot be excluded.  Mild contrast enhancement and air present muscle injury, muscle inflammation or less likely, neoplastic involvement.      12/7/2018 Genetic Testing             1/17/2019 Imaging     MRI right gluteal region showed fatty tumor posteriorly in the fascia varun      2/4/2019 -  Other Event     Consultation with Dr. Bob Parham at the [facility] for left thigh lipoma,  recommended physical therapy for her snapping hip and trocar bursitis, in regard to the tumor no plans on surgically resecting.  Follow-up PRN.         HISTORY OF PRESENT ILLNESS:  The patient is a 49 y.o. female, here for follow up on management of Significant myalgias, joint aches, and severe hot flashes despite venlafaxine on tamoxifen with 3 menopausal hormone levels per her gynecologist before start of tamoxifen.       Past Medical History:   Diagnosis Date   • Breast cancer (CMS/HCC) 2018   • Diabetes mellitus (CMS/HCC)    • Disease of thyroid gland      Past Surgical History:   Procedure Laterality Date   • BREAST LUMPECTOMY  "Left 02/2018   • KNEE ARTHROSCOPY  2004       Allergies   Allergen Reactions   • Codeine GI Intolerance   • Morphine GI Intolerance   • Taxotere [Docetaxel] Hives   • Cytoxan [Cyclophosphamide] Hives   • Zyrtec [Cetirizine] Rash       Family History and Social History reviewed and changed as necessary      REVIEW OF SYSTEM:   Review of Systems   Constitutional: Negative for appetite change, chills, diaphoresis, fatigue, fever and unexpected weight change.   HENT:   Negative for mouth sores, sore throat and trouble swallowing.    Eyes: Negative for icterus.   Respiratory: Negative for cough, hemoptysis and shortness of breath.    Cardiovascular: Negative for chest pain, leg swelling and palpitations.   Gastrointestinal: Negative for abdominal distention, abdominal pain, blood in stool, constipation, diarrhea, nausea and vomiting.   Endocrine: Negative for hot flashes.   Genitourinary: Negative for bladder incontinence, difficulty urinating, dysuria, frequency and hematuria.    Musculoskeletal: Negative for gait problem, neck pain and neck stiffness.   Skin: Negative for rash.   Neurological: Negative for dizziness, gait problem, headaches, light-headedness and numbness.   Hematological: Negative for adenopathy. Does not bruise/bleed easily.   Psychiatric/Behavioral: Negative for depression. The patient is not nervous/anxious.    All other systems reviewed and are negative.       PHYSICAL EXAM    Vitals:    02/04/20 1526   BP: 131/83   Pulse: 87   Resp: 18   Temp: 99.1 °F (37.3 °C)   Weight: 77.1 kg (170 lb)   Height: 167.6 cm (66\")     Constitutional: Appears well-developed and well-nourished. No distress.   ECOG: (1) Restricted in Physically Strenuous Activity, Ambulatory & Able to Do Work of Light Nature  HENT:   Head: Normocephalic.   Mouth/Throat: Oropharynx is clear and moist.   Eyes: Conjunctivae are normal. Pupils are equal, round, and reactive to light. No scleral icterus.   Neck: Neck supple. No JVD present. " "No thyromegaly present.   Cardiovascular: Normal rate, regular rhythm and normal heart sounds.    Pulmonary/Chest: Breath sounds normal. No respiratory distress.   Abdominal: Soft. Exhibits no distension and no mass. There is no hepatosplenomegaly. There is no tenderness. There is no rebound and no guarding.   Musculoskeletal:Exhibits no edema, tenderness or deformity.   Neurological: Alert and oriented to person, place, and time. Exhibits normal muscle tone.   Skin: No ecchymosis, no petechiae and no rash noted. Not diaphoretic. No cyanosis. Nails show no clubbing.   Psychiatric: Normal mood and affect.   Vitals reviewed.      Lab Results   Component Value Date    HGB 14.9 10/09/2018    HCT 45.2 10/09/2018    MCV 83 10/09/2018     10/09/2018    WBC 6.9 10/09/2018    NEUTROABS 4.5 10/09/2018    LYMPHSABS 1.9 10/09/2018    MONOSABS 0.3 10/09/2018    EOSABS 0.2 10/09/2018    BASOSABS 0.0 10/09/2018       Lab Results   Component Value Date    BUN 14 10/09/2018    CREATININE 0.71 10/09/2018     10/09/2018    K 4.5 10/09/2018     10/09/2018    CO2 19 (L) 10/09/2018    CALCIUM 10.0 10/09/2018    ALBUMIN 4.6 10/09/2018    BILITOT <0.2 10/09/2018    ALKPHOS 63 10/09/2018    AST 20 10/09/2018    ALT 18 10/09/2018                   ASSESSMENT & PLAN:    1. T2N0 hormone receptor positive breast cancer status post 2 courses of Taxotere Cytoxan with Dr. Tao and subsequent tamoxifen due to premenopausal status per gynecologist with severe intolerance of tamoxifen  2. Severe hot flashes  3. Severe myalgias with \"fibromyalgia predating tamoxifen but worse since tamoxifen  4. Severe arthralgias and joint stiffness where she can barely move in the mornings in her hands and feet    Discussion: I reviewed her bone scan and it was unremarkable.  I am going to have her stop her tamoxifen for 3 weeks and we will see her back after that and if her symptoms dramatically watson, then she will have to decide whether " the magnitude of benefit from hormone blockade is great enough to outweigh the risks of putting up with the side effects that are plaguing her.  If on the other hand she feels no better off the tamoxifen then there is little to be lost by continuing it and she would probably need formal rheumatological evaluation and I am not sure there is much else to do for the hot flashes beyond the gabapentin and the venlafaxine that we have already tried that would be of any worse other than, of course, giving estrogen which of course would make no sense in concert with the tamoxifen.  I discussed this with her for 30 minutes face-to-face greater than 50% spent counseling regarding this plan and the results of her bone scan and the images thereof that I reviewed.      Derrick Terry MD    02/04/2020

## 2020-03-10 ENCOUNTER — OFFICE VISIT (OUTPATIENT)
Dept: ONCOLOGY | Facility: CLINIC | Age: 50
End: 2020-03-10

## 2020-03-10 VITALS
BODY MASS INDEX: 28.45 KG/M2 | WEIGHT: 177 LBS | HEART RATE: 100 BPM | RESPIRATION RATE: 18 BRPM | HEIGHT: 66 IN | TEMPERATURE: 98.7 F | DIASTOLIC BLOOD PRESSURE: 84 MMHG | SYSTOLIC BLOOD PRESSURE: 129 MMHG

## 2020-03-10 DIAGNOSIS — C50.412 MALIGNANT NEOPLASM OF UPPER-OUTER QUADRANT OF LEFT BREAST IN FEMALE, ESTROGEN RECEPTOR POSITIVE (HCC): Primary | ICD-10-CM

## 2020-03-10 DIAGNOSIS — Z17.0 MALIGNANT NEOPLASM OF UPPER-OUTER QUADRANT OF LEFT BREAST IN FEMALE, ESTROGEN RECEPTOR POSITIVE (HCC): Primary | ICD-10-CM

## 2020-03-10 PROCEDURE — 99214 OFFICE O/P EST MOD 30 MIN: CPT | Performed by: INTERNAL MEDICINE

## 2020-03-10 RX ORDER — CELECOXIB 200 MG/1
CAPSULE ORAL
COMMUNITY
Start: 2020-02-29 | End: 2022-04-21 | Stop reason: SDUPTHER

## 2020-03-10 RX ORDER — TAMOXIFEN CITRATE 20 MG/1
20 TABLET ORAL 2 TIMES DAILY
Qty: 60 TABLET | Refills: 11 | Status: SHIPPED | OUTPATIENT
Start: 2020-03-10 | End: 2020-07-22 | Stop reason: SINTOL

## 2020-03-10 NOTE — PROGRESS NOTES
CHIEF COMPLAINT: Hot flashes with myalgias and arthralgias of hands and feet and distal lower extremities.    Problem List:  Oncology/Hematology History    1. Left breast cancer: She had breast cancer biopsied 2/6/18 by Dr. marquez showing infiltrating ductal carcinoma [place] [address] 6 out of 9, 60% 1+ HER-2/magaly, ER greater than 95% 2+, [place] 95% 2-3+.  On 3/13/18 she had lumpectomy for a 2.5 cm 0 out of one grade 2 infiltrating lobular carcinoma.  Mammaprint came back with a 10% distant risk of recurrence on hormonal blockade alone.  Because of the tumor over 2 cm in size, Dr. Tao recommended Taxotere and Cytoxan but after the second course of therapy she had a severe allergic reaction with hives.  She received 30 radiation treatments, completed 07/2018 and, given a history of DEANA without BSO for fibroids in 2012, Dr. Toya Carrillo due to her hormonal levels and found her to be premenopausal.  Hence she was started on tamoxifen for which she has been taking gabapentin for hot flashes.  Still having a lot of dermatographia is.  Has a hiatal hernia as well as a hearing loss since that time.  She came to me for the first time 10/9/18 asking me to assume her care.  She was concerned regarding some hip pain for which she had been told in the past there was some tumor in the abdomen and seeing orthopedics for was told she did not need to do anything about it.  She also had hip injections for spinal stenosis with neurology and movable.  I asked her to get her prior bone imaging reports and discs and I'm getting a total body bone scan.  She had previous genetic testing August 2018 negative for BRCA and she is going to get those reports for rest of review what genetic test were done.  Assuming the bone scan is unremarkable and the outside imaging does not suggest any other worrisome bony abnormalities and I certainly think at least 5 years of tamoxifen is reasonable.  She will continue with the gabapentin.     Per  records from Dr. Dr. Tao, she completed radiation to the left breast 7/30/18.  Because of shortness of breath with pleurisy, CT PE protocol 5/8/18 was done and no PE found.  ENT evaluation 5/17/18 with flexible laryngoscopy showed widely patent airway and prednisone was tapered post hypersensitivity reaction to the Taxotere.  Per note from . Dr. Tao of 8/27/18, BRCA1 and BRCA2 mutation analysis was ordered.  This was done with [facility] which showed no clinically significant mutation but there was a variant of uncertain significance found.  I recommended repeating this with cancernext panel for more thorough evaluation.  According to a note from her primary care in 2015 there was mention of an MRI showing no change in a thigh lipoma which I suspect is the mass she is referring to.  I performed total body bone scan 10/23/18 which was entirely negative.  She does have a large mass in the superior lateral aspect of her left thigh which has not grown and likely lipomatous but given the size of this, I am referring her to Bob Parham for evaluation.  She brought her discs with her on her visit to see me on 11/13/18 and she will take those to Dr. Parham and then bring them back to me for us to load into our system for the future.  In the meantime I will get her to our genetic counselors for further testing and to have her monitored over time in the event that her variant of undetermined significance becomes one that is determined to be significant.            Malignant neoplasm of upper-outer quadrant of left breast in female, estrogen receptor positive (CMS/HCC)    2/6/2018 Initial Diagnosis     Malignant neoplasm of upper-outer quadrant of breast in female, estrogen receptor positive (CMS/HCC)      7/2/2018 -  Hormonal Therapy     Tamoxifen began after radiation      10/23/2018 Imaging     Total body bone scan      12/7/2018 Genetic Testing     Genetic testing Results were obtained from [employer]  confirming that a 28 gene [facility] panel had been performed, which included all high and moderate risk breast cancer genes as well as genes related to other cancer risks.  Two variants of uncertain significance (VUS) were identified, in the BARD1 and PMS2 genes.  The ClinVar database was referenced, which did not show any alternative interpretations of either VUS.           1/24/2020 Imaging      bone scan stable with no progressive abnormalities to explain left rib pain        Lipoma of left lower extremity    3/23/2015 Initial Diagnosis     Mass of left thigh      3/23/2015 Imaging     MRI left lower extremity Impression:  Abnormal left tensor fascial des muscle of uncertain significance.  That signal may represent a lipoma but is more infiltrative and unusual.  Other neoplastic process including liposarcoma cannot be excluded.  Mild contrast enhancement and air present muscle injury, muscle inflammation or less likely, neoplastic involvement.      12/7/2018 Genetic Testing             1/17/2019 Imaging     MRI right gluteal region showed fatty tumor posteriorly in the fascia varun      2/4/2019 -  Other Event     Consultation with Dr. Bob Parham at the [facility] for left thigh lipoma,  recommended physical therapy for her snapping hip and trocar bursitis, in regard to the tumor no plans on surgically resecting.  Follow-up PRN.         HISTORY OF PRESENT ILLNESS:  The patient is a 50 y.o. female, here for follow up on management of myalgias and arthralgias of hands and feet and distal lower extremities with severe hot flashes.  All of the symptoms were no better while off of the tamoxifen for the last 3 weeks.      Past Medical History:   Diagnosis Date   • Breast cancer (CMS/HCC) 2018   • Diabetes mellitus (CMS/HCC)    • Disease of thyroid gland      Past Surgical History:   Procedure Laterality Date   • BREAST LUMPECTOMY Left 02/2018   • KNEE ARTHROSCOPY  2004       Allergies   Allergen  "Reactions   • Codeine GI Intolerance   • Morphine GI Intolerance   • Taxotere [Docetaxel] Hives   • Cytoxan [Cyclophosphamide] Hives   • Zyrtec [Cetirizine] Rash       Family History and Social History reviewed and changed as necessary      REVIEW OF SYSTEM:   Review of Systems   Constitutional: Negative for appetite change, chills, diaphoresis, fatigue, fever and unexpected weight change.   HENT:   Negative for mouth sores, sore throat and trouble swallowing.    Eyes: Negative for icterus.   Respiratory: Negative for cough, hemoptysis and shortness of breath.    Cardiovascular: Negative for chest pain, leg swelling and palpitations.   Gastrointestinal: Negative for abdominal distention, abdominal pain, blood in stool, constipation, diarrhea, nausea and vomiting.   Endocrine: Negative for hot flashes.   Genitourinary: Negative for bladder incontinence, difficulty urinating, dysuria, frequency and hematuria.    Musculoskeletal: Negative for gait problem, neck pain and neck stiffness.   Skin: Negative for rash.   Neurological: Negative for dizziness, gait problem, headaches, light-headedness and numbness.   Hematological: Negative for adenopathy. Does not bruise/bleed easily.   Psychiatric/Behavioral: Negative for depression. The patient is not nervous/anxious.    All other systems reviewed and are negative.       PHYSICAL EXAM    Vitals:    03/10/20 1002   BP: 129/84   Pulse: 100   Resp: 18   Temp: 98.7 °F (37.1 °C)   Weight: 80.3 kg (177 lb)   Height: 167.6 cm (66\")     There were no vitals filed for this visit.     Constitutional: Appears well-developed and well-nourished. No distress.   ECOG: (1) Restricted in Physically Strenuous Activity, Ambulatory & Able to Do Work of Light Nature  HENT:   Head: Normocephalic.   Mouth/Throat: Oropharynx is clear and moist.   Eyes: Conjunctivae are normal. Pupils are equal, round, and reactive to light. No scleral icterus.   Neck: Neck supple. No JVD present. No thyromegaly " "present.   Cardiovascular: Normal rate, regular rhythm and normal heart sounds.    Pulmonary/Chest: Breath sounds normal. No respiratory distress.   Abdominal: Soft. Exhibits no distension and no mass. There is no hepatosplenomegaly. There is no tenderness. There is no rebound and no guarding.  No synovitis  Musculoskeletal:Exhibits no edema, tenderness or deformity.  No trigger points.  Neurological: Alert and oriented to person, place, and time. Exhibits normal muscle tone.   Skin: No ecchymosis, no petechiae and no rash noted. Not diaphoretic. No cyanosis. Nails show no clubbing.   Psychiatric: Normal mood and affect.   Vitals reviewed.      Lab Results   Component Value Date    HGB 14.9 10/09/2018    HCT 45.2 10/09/2018    MCV 83 10/09/2018     10/09/2018    WBC 6.9 10/09/2018    NEUTROABS 4.5 10/09/2018    LYMPHSABS 1.9 10/09/2018    MONOSABS 0.3 10/09/2018    EOSABS 0.2 10/09/2018    BASOSABS 0.0 10/09/2018       Lab Results   Component Value Date    BUN 14 10/09/2018    CREATININE 0.71 10/09/2018     10/09/2018    K 4.5 10/09/2018     10/09/2018    CO2 19 (L) 10/09/2018    CALCIUM 10.0 10/09/2018    ALBUMIN 4.6 10/09/2018    BILITOT <0.2 10/09/2018    ALKPHOS 63 10/09/2018    AST 20 10/09/2018    ALT 18 10/09/2018                   ASSESSMENT & PLAN:    1.  T2N0 hormone receptor positive breast cancer status post 2 courses of Taxotere Cytoxan with Dr. Tao and subsequent tamoxifen due to premenopausal status per gynecologist with severe intolerance of tamoxifen  2. Severe hot flashes  3. Severe myalgias with \"fibromyalgia predating tamoxifen but worse since tamoxifen  4. Severe arthralgias and joint stiffness where she can barely move in the mornings in her hands and feet       Discussion: She will resume tamoxifen as her symptoms were not significantly improved off the tamoxifen for the last 3 weeks.  She thinks maybe the hot flashes might of been a little less intense but they were " more frequent.  We will get her to our rheumatologists for her myalgias and arthralgias and to our palliative care team to see if they have any thoughts to help with her hot flashes beyond what we have already tried and in the meantime we will put her back on tamoxifen.  She wants to try the 10 mg twice a day dose albeit I doubt that will make much difference since being completely off tamoxifen really made no significant difference.  She had bilateral mammograms BI-RADS 2 January 14, 2020 and follow-up left breast mammogram in July is recommended.  We will see her back after that.  Discussed with patient face-to-face 30 minutes greater than 50% spent counseling regarding this plan as outlined above.      Derrick Terry MD    03/10/2020

## 2020-03-13 ENCOUNTER — TELEPHONE (OUTPATIENT)
Dept: ONCOLOGY | Facility: CLINIC | Age: 50
End: 2020-03-13

## 2020-03-13 NOTE — TELEPHONE ENCOUNTER
Pt called requesting to speak with nurse regarding the strength and instructions of a prescription.    Pt was taking tamoxifen 20 MG tablet once daily.    Pt stated she seen Dr. Terry on 3/10/20 and understood that Dr. Terry wanted the pt to begin taking 10 MG of tamoxifen twice daily.     When pt picked up the prescription it stated the pt was to take the tamoxifen 20 MG, twice daily.    Please call pt at [phone] to verify the instructions of this prescriptions     Unable to transfer call, [facility] was told to send High Priority encounter.

## 2020-03-13 NOTE — TELEPHONE ENCOUNTER
Discussed with Dr. Terry. Advised patient that she is to take the 10mg BID. Aware the script sent was 20 mg BID. Confirmed to only take the 10mg BID. Patient verbalized understanding.

## 2020-07-22 ENCOUNTER — TELEMEDICINE (OUTPATIENT)
Dept: ONCOLOGY | Facility: CLINIC | Age: 50
End: 2020-07-22

## 2020-07-22 DIAGNOSIS — Z17.0 MALIGNANT NEOPLASM OF UPPER-OUTER QUADRANT OF LEFT BREAST IN FEMALE, ESTROGEN RECEPTOR POSITIVE (HCC): Primary | ICD-10-CM

## 2020-07-22 DIAGNOSIS — C50.412 MALIGNANT NEOPLASM OF UPPER-OUTER QUADRANT OF LEFT BREAST IN FEMALE, ESTROGEN RECEPTOR POSITIVE (HCC): Primary | ICD-10-CM

## 2020-07-22 DIAGNOSIS — T45.1X5A HOT FLASHES DUE TO TAMOXIFEN: ICD-10-CM

## 2020-07-22 DIAGNOSIS — R23.2 HOT FLASHES DUE TO TAMOXIFEN: ICD-10-CM

## 2020-07-22 PROCEDURE — 99213 OFFICE O/P EST LOW 20 MIN: CPT | Performed by: NURSE PRACTITIONER

## 2020-07-22 RX ORDER — TAMOXIFEN CITRATE 10 MG/1
10 TABLET ORAL 2 TIMES DAILY
Qty: 180 TABLET | Refills: 3 | Status: SHIPPED | OUTPATIENT
Start: 2020-07-22 | End: 2021-07-06

## 2020-07-22 NOTE — PROGRESS NOTES
"TELEMEDICINE FOLLOW-UP     In order to limit face-to-face contact and enact \"social distancing\" in light of the COVID-19 outbreaks and in accordance to the recommendations per the CDC, WHO, and our individual department, Porsha Bolden  was contacted.  Telemedicine was used to screen the patient for needs and conduct her regularly scheduled follow-up.    This was an audio and video enabled telemedicine encounter.      COVID-19 screening: female specifically denies fever, body aches, cough, difficulty breathing, sore throat, runny nose, recent travel, or known sick exposures.      Oncology/Hematology History    1. Left breast cancer: She had breast cancer biopsied 2/6/18 by Dr. marquez showing infiltrating ductal carcinoma [place] 6 out of 9, 60% 1+ HER-2/magaly, ER greater than 95% 2+, ME 95% 2-3+.  On 3/13/18 she had lumpectomy for a 2.5 cm 0 out of one grade 2 infiltrating lobular carcinoma.  Mammaprint came back with a 10% distant risk of recurrence on hormonal blockade alone.  Because of the tumor over 2 cm in size, Dr. Tao recommended Taxotere and Cytoxan but after the second course of therapy she had a severe allergic reaction with hives.  She received 30 radiation treatments, completed 07/2018 and, given a history of DEANA without BSO for fibroids in 2012, Dr. Toya Carrillo due to her hormonal levels and found her to be premenopausal.  Hence she was started on tamoxifen for which she has been taking gabapentin for hot flashes.  Still having a lot of dermatographia is.  Has a hiatal hernia as well as a hearing loss since that time.  She came to me for the first time 10/9/18 asking me to assume her care.  She was concerned regarding some hip pain for which she had been told in the past there was some tumor in the abdomen and seeing orthopedics for was told she did not need to do anything about it.  She also had hip injections for spinal stenosis with neurology and movable.  I asked her to get " her prior bone imaging reports and discs and I'm getting a total body bone scan.  She had previous genetic testing August 2018 negative for BRCA and she is going to get those reports for rest of review what genetic test were done.  Assuming the bone scan is unremarkable and the outside imaging does not suggest any other worrisome bony abnormalities and I certainly think at least 5 years of tamoxifen is reasonable.  She will continue with the gabapentin.     Per records from Dr. Dr. Tao, she completed radiation to the left breast 7/30/18.  Because of shortness of breath with pleurisy, CT PE protocol 5/8/18 was done and no PE found.  ENT evaluation 5/17/18 with flexible laryngoscopy showed widely patent airway and prednisone was tapered post hypersensitivity reaction to the Taxotere.  Per note from . Dr. Tao of 8/27/18, BRCA1 and BRCA2 mutation analysis was ordered.  This was done with [facility] which showed no clinically significant mutation but there was a variant of uncertain significance found.  I recommended repeating this with cancernext panel for more thorough evaluation.  According to a note from her primary care in 2015 there was mention of an MRI showing no change in a thigh lipoma which I suspect is the mass she is referring to.  I performed total body bone scan 10/23/18 which was entirely negative.  She does have a large mass in the superior lateral aspect of her left thigh which has not grown and likely lipomatous but given the size of this, I am referring her to Bob Parham for evaluation.  She brought her discs with her on her visit to see me on 11/13/18 and she will take those to Dr. Parham and then bring them back to me for us to load into our system for the future.  In the meantime I will get her to our genetic counselors for further testing and to have her monitored over time in the event that her variant of undetermined significance becomes one that is determined to be  significant.            Malignant neoplasm of upper-outer quadrant of left breast in female, estrogen receptor positive (CMS/HCC)    2/6/2018 Initial Diagnosis     Malignant neoplasm of upper-outer quadrant of breast in female, estrogen receptor positive (CMS/HCC)      7/2/2018 -  Hormonal Therapy     Tamoxifen began after radiation      10/23/2018 Imaging     Total body bone scan      12/7/2018 Genetic Testing     Genetic testing Results were obtained from [facility] confirming that a 28 gene NAVXsk panel had been performed, which included all high and moderate risk breast cancer genes as well as genes related to other cancer risks.  Two variants of uncertain significance (VUS) were identified, in the BARD1 and PMS2 genes.  The ClinVar database was referenced, which did not show any alternative interpretations of either VUS.           1/14/2020 Imaging     1/14/2020 mammogram BI-RADS VI with recommended unilateral follow-up left mammogram in July.      1/24/2020 Imaging      bone scan stable with no progressive abnormalities to explain left rib pain        Lipoma of left lower extremity    3/23/2015 Initial Diagnosis     Mass of left thigh      3/23/2015 Imaging     MRI left lower extremity Impression:  Abnormal left tensor fascial des muscle of uncertain significance.  That signal may represent a lipoma but is more infiltrative and unusual.  Other neoplastic process including liposarcoma cannot be excluded.  Mild contrast enhancement and air present muscle injury, muscle inflammation or less likely, neoplastic involvement.      1/17/2019 Imaging     MRI right gluteal region showed fatty tumor posteriorly in the fascia varun      2/4/2019 -  Other Event     Consultation with Dr. Bob Parham at the [facility] for left thigh lipoma,  recommended physical therapy for her snapping hip and trocar bursitis, in regard to the tumor no plans on surgically resecting.  Follow-up PRN.         Brief  History: The patient overall has been doing well since we saw her last with no change in her health.  Continues to have diffuse arthralgias, also chronic pain from fibromyalgia.  [facility]  Still waiting on palliative care appointment for her significant hot flashes.  Continues to have severe hot flashes, seems to be worse during the hot weather, also has intermittent chilling after a hot flash which required her to wrap up in warm blanket at times.  No fevers.  Did do COVID testing because she had traveled out of town, testing was negative, she also self quarantined.  No current respiratory concerns.    Review of Systems: a 14 point review of systems was performed and is negative except as noted above.      MEDICATIONS: Medication reconciliation for the patient was reviewed and confirmed in the electronic medical record.    The following portions of the patient's history were reviewed and updated as appropriate: allergies, current medications, past family history, past medical history, past social history, past surgical history and problem list.    Labs:  Lab Results   Component Value Date    WBC 6.9 10/09/2018    HGB 14.9 10/09/2018    HCT 45.2 10/09/2018    MCV 83 10/09/2018     10/09/2018     Lab Results   Component Value Date    BUN 14 10/09/2018    CREATININE 0.71 10/09/2018    EGFRIFNONA 100 10/09/2018    EGFRIFAFRI 116 10/09/2018    BCR 20 10/09/2018    K 4.5 10/09/2018    CO2 19 (L) 10/09/2018    CALCIUM 10.0 10/09/2018    PROTENTOTREF 7.1 10/09/2018    ALBUMIN 4.6 10/09/2018    LABIL2 1.8 10/09/2018    AST 20 10/09/2018    ALT 18 10/09/2018         Assessment and Plan:     1.  T2N0 hormone receptor positive breast cancer status post 2 courses of Taxotere and Cytoxan with Dr. Tao, discontinued due to severe allergic reaction, currently on adjuvant therapy with tamoxifen  2.  Severe hot flashes on tamoxifen  3.  Fibromyalgia  4.   Arthralgias    Discussion: Overall stable on tamoxifen that she is taking 10 mg twice daily rather than one 20 mg tablet daily as this seems to be better tolerated.  Prescription was sent today for tamoxifen, 10 mg twice daily, #180 with 3 refills.  Plan on 5 years adjuvant therapy which would be July 2023, due to her significant side effects not sure we could get her through 7 years.  She will need periodic bone density testing, she states this may be done when she has her appointment with rheumatology who she sees early August.  Referral was also made to palliative care previously, she is waiting on appointment.  She is up-to-date on mammography, 715 left diagnostic mammogram was negative, BI-RADS 2.  She will be due for bilateral mammogram in January and I have ordered that today.      We will see her back in 6 months for follow-up and then can go to annual follow up as she sees Dr. Jennings in May.    This visit has been rescheduled as a phone visit to comply with patient safety concerns in accordance with CDC recommendations. Total time of discussion was 15 minutes.    Orly Masterson, APRN  07/22/2020

## 2020-11-02 DIAGNOSIS — Z51.81 THERAPEUTIC DRUG MONITORING: Primary | ICD-10-CM

## 2020-11-03 ENCOUNTER — LAB (OUTPATIENT)
Dept: LAB | Facility: HOSPITAL | Age: 50
End: 2020-11-03

## 2020-11-03 ENCOUNTER — OFFICE VISIT (OUTPATIENT)
Dept: PALLIATIVE CARE | Facility: CLINIC | Age: 50
End: 2020-11-03

## 2020-11-03 VITALS
HEART RATE: 94 BPM | DIASTOLIC BLOOD PRESSURE: 80 MMHG | OXYGEN SATURATION: 96 % | SYSTOLIC BLOOD PRESSURE: 127 MMHG | TEMPERATURE: 95.7 F

## 2020-11-03 DIAGNOSIS — T45.1X5A HOT FLASHES DUE TO TAMOXIFEN: Primary | ICD-10-CM

## 2020-11-03 DIAGNOSIS — Z51.81 THERAPEUTIC DRUG MONITORING: ICD-10-CM

## 2020-11-03 DIAGNOSIS — R23.2 HOT FLASHES DUE TO TAMOXIFEN: Primary | ICD-10-CM

## 2020-11-03 DIAGNOSIS — M79.7 FIBROMYALGIA: ICD-10-CM

## 2020-11-03 LAB
AMPHET+METHAMPHET UR QL: NEGATIVE
AMPHETAMINES UR QL: NEGATIVE
BARBITURATES UR QL SCN: NEGATIVE
BENZODIAZ UR QL SCN: NEGATIVE
BUPRENORPHINE SERPL-MCNC: NEGATIVE NG/ML
CANNABINOIDS SERPL QL: NEGATIVE
COCAINE UR QL: NEGATIVE
METHADONE UR QL SCN: NEGATIVE
OPIATES UR QL: NEGATIVE
OXYCODONE UR QL SCN: NEGATIVE
PCP UR QL SCN: NEGATIVE
PROPOXYPH UR QL: NEGATIVE
TRICYCLICS UR QL SCN: NEGATIVE

## 2020-11-03 PROCEDURE — 99203 OFFICE O/P NEW LOW 30 MIN: CPT | Performed by: INTERNAL MEDICINE

## 2020-11-03 PROCEDURE — 80306 DRUG TEST PRSMV INSTRMNT: CPT

## 2020-11-03 RX ORDER — CLONIDINE HYDROCHLORIDE 0.1 MG/1
0.1 TABLET ORAL 2 TIMES DAILY
Qty: 60 TABLET | Refills: 0 | Status: SHIPPED | OUTPATIENT
Start: 2020-11-03 | End: 2020-12-02 | Stop reason: SDUPTHER

## 2020-11-03 RX ORDER — ESCITALOPRAM OXALATE 5 MG/1
TABLET ORAL
Qty: 53 TABLET | Refills: 0 | Status: SHIPPED | OUTPATIENT
Start: 2020-11-03 | End: 2020-12-02 | Stop reason: SDUPTHER

## 2020-11-03 NOTE — PATIENT INSTRUCTIONS
1.  Avoid caffeine and alcohol  2.  Vitamin E over the counter supplement can help  3.  Clonidine helps decrease frequency of hot flashes.  If used routinely, beware of rebound high blood pressure after discontinuation  4.  Belladona capsules as needed for hot flashes  5.  Trial switch from Effexor to Lexapro  6.  Consider increase Gabapentin to address hot flashes as well  7.  Dr. Zabrina Jenkins at [facility] - Physical Medicine and Rehabilitation and nutrition based medicine

## 2020-11-03 NOTE — PROGRESS NOTES
Referring provider:  Derrick Terry MD     Patient Care Team:  Jason Benson MD as PCP - General (Family Medicine)  Bob Parham MD PhD as Surgeon (Orthopedic Surgery)  Luisito Jennings MD as Consulting Physician (Radiation Oncology)  Toya Carrillo MD (Inactive) as Obstetrician (Obstetrics and Gynecology)  Ruy Bob MD as Consulting Physician (General Surgery)      Brent Bolden is a 50 y.o. female.     History of Present Illness:  Pt is 49yo female with h/o L breast carcinoma dx'ed on 2/26/2018 and tx'ed w/ taxotere/cytoxan but which was discontinued secondary severe allergic reaction during second course with hives and trouble breathing. Completed radiation treatment 7/30/18. Pt is on tamoxifen and is referred to palliative care for severe hot flashes with intervening cold spells since starting tamoxifen.  Pt also has chronic diffuse pain from fibromyalgia and sees rheumatology, as well as arthralgias who was seen by the  [facility] previously. Pt also states a h/o Sjogren's and Reynaud's syndrome.       Treatment plan:  Hx of prior chemo and radiation treatment as above. Pt on tamoxifen which is planned for a total of 10 years and the dose was split previously to 10 mg po BID (which was 20 mg po QD) in attempt to decrease side effects/ hot flashes, per pt.     Social History     Socioeconomic History   • Marital status:  X 24 years.     Spouse name: Not given.  has two sons and 3 grandchildren from a prior marriage.  states he has not had alcohol in 34 years.   • Number of children: 2 step-sons   • Years of education: Not on file   • Highest education level: Not on file    Tobacco Use   • Smoking status: Never Smoker   Substance and Sexual Activity   • Alcohol use: Yes     Uses alcohol rarely and only small amounts     Retired  for [employer] company.       HISTORY:  Pain/Symptoms: Pt states h/o  "hot flashes at least 5 X Q day since starting tamoxifen in 2018 with drenching sweats described as \"like taking a shower\". She states intervening cold spells where she needs to be wrapped in a blanket.      Pt has h/o fibromyalgia described as pain \"all over\" who is seen by rheumatology and treated with celebrex, gabapentin, and flexeril. Pt also describes cramping and intermittent pain in the bilateral LE shins and ankles/ feet. Pt has left hip lipoma with snapping sensation which causes some pain and states that it has been decided to not have surgery at this time.      Opioid efficacy/side effect assessment:  ANALGESIA:  As above.  ADVERSE EFFECTS:  Vacillates between constipation and diarrhea. Last BM today soft and formed.     ACTIVITY:  Symptoms interfere with some activities but otherwise fully active.  AFFECT:  States anxiety secondary to symptoms.   ABERRANT BEHAVIORS: None apparent.    Goals: Pt goals are to have the hot and cold spells more tolerable. Pt also would like to try to treat her fibromyalgia with diet and non-pharmacologic treatments.    The following portions of the patient's history were reviewed and updated as appropriate: allergies, current medications, past family history, past medical history, past social history, past surgical history and problem list.    Review of Systems   Constitutional: Positive for activity change, diaphoresis and fatigue.   HENT: Positive for congestion, dental problem and hearing loss.         Eight teeth removed possibly secondary to chemo side-effect.    Eyes: Positive for visual disturbance (Poor left eye vision for many years.).   Gastrointestinal: Positive for constipation and diarrhea.   Endocrine: Positive for cold intolerance and heat intolerance.   Musculoskeletal: Positive for arthralgias and myalgias.     Otherwise negative except as below and as already detailed in HPI.    ESAS:  Flowsheet reviewed.  Palliative Performance Scale  Palliative Performance " Scale Score: 90%  Pain Score:   8   ESAS Tiredness Score: 5  ESAS Nausea Score: No nausea  ESAS Depression Score: 2  ESAS Anxiety Score: No anxiety  ESAS Drowsiness Score: 4  ESAS Lack of Appetite Score: 4  ESAS Wellbeing Score: 6  ESAS Dyspnea Score: No shortness of breath  ESAS Other Problem Score: 6  ESAS Source of Information: patient    MARIN-7:     Over the last two weeks, how often have you been bothered by the following problems?  Feeling nervous, anxious or on edge: Several days  Not being able to stop or control worrying: More than half the days  Worrying too much about different things: More than half the days  Trouble Relaxing: Nearly every day  Being so restless that it is hard to sit still: Nearly every day  Becoming easily annoyed or irritable: Several days  Feeling afraid as if something awful might happen: More than half the days  MARIN 7 Total Score: 14    PHQ-9:    PHQ-2/PHQ-9 Depression Screening 11/3/2020   Little interest or pleasure in doing things 2   Feeling down, depressed, or hopeless 1   Trouble falling or staying asleep, or sleeping too much 3   Feeling tired or having little energy 2   Poor appetite or overeating 1   Feeling bad about yourself - or that you are a failure or have let yourself or your family down 1   Trouble concentrating on things, such as reading the newspaper or watching television 2   Moving or speaking so slowly that other people could have noticed. Or the opposite - being so fidgety or restless that you have been moving around a lot more than usual 1   Thoughts that you would be better off dead, or of hurting yourself in some way 0   Total Score 13   If you checked off any problems, how difficult have these problems made it for you to do your work, take care of things at home, or get along with other people? Somewhat difficult        ECO- Fully active, able to carry on all pre-disease performance without significant restriction.    Objective   Physical Exam  Vitals  signs reviewed.   Constitutional:       General: She is not in acute distress.     Appearance: Normal appearance. She is normal weight. She is not diaphoretic.   HENT:      Head: Normocephalic and atraumatic.   Eyes:      Extraocular Movements: Extraocular movements intact.      Conjunctiva/sclera: Conjunctivae normal.   Neck:      Musculoskeletal: Normal range of motion and neck supple.   Cardiovascular:      Rate and Rhythm: Regular rhythm. Tachycardia present.      Heart sounds: Normal heart sounds.   Pulmonary:      Breath sounds: Normal breath sounds. No wheezing, rhonchi or rales.   Abdominal:      General: Abdomen is flat. Bowel sounds are normal.      Palpations: Abdomen is soft.   Musculoskeletal: Normal range of motion.         General: No swelling.   Skin:     General: Skin is warm and dry.   Neurological:      General: No focal deficit present.      Mental Status: She is alert and oriented to person, place, and time.   Psychiatric:         Mood and Affect: Mood normal.         Behavior: Behavior normal.         Thought Content: Thought content normal.         Judgment: Judgment normal.           Medication Counts:  Did not bring medication to appointment    CHHAYA:  Reviewed.  No concerns.  Consistent with history.  Prescribers identified as members of care team.     CONTROLLED MEDICATION TRACKING FLOWSHEET:  No flowsheet data found.    UDS:  THC, Screen, Urine   Date Value Ref Range Status   11/03/2020 Negative Negative Final     Phencyclidine (PCP), Urine   Date Value Ref Range Status   11/03/2020 Negative Negative Final     Cocaine Screen, Urine   Date Value Ref Range Status   11/03/2020 Negative Negative Final     Methamphetamine, Ur   Date Value Ref Range Status   11/03/2020 Negative Negative Final     Opiate Screen   Date Value Ref Range Status   11/03/2020 Negative Negative Final     Amphetamine Screen, Urine   Date Value Ref Range Status   11/03/2020 Negative Negative Final     Benzodiazepine  Screen, Urine   Date Value Ref Range Status   11/03/2020 Negative Negative Final     Tricyclic Antidepressants Screen   Date Value Ref Range Status   11/03/2020 Negative Negative Final     Methadone Screen, Urine   Date Value Ref Range Status   11/03/2020 Negative Negative Final     Barbiturates Screen, Urine   Date Value Ref Range Status   11/03/2020 Negative Negative Final     Oxycodone Screen, Urine   Date Value Ref Range Status   11/03/2020 Negative Negative Final     Propoxyphene Screen   Date Value Ref Range Status   11/03/2020 Negative Negative Final     Buprenorphine, Screen, Urine   Date Value Ref Range Status   11/03/2020 Negative Negative Final     Palliative Performance Scale  Palliative Performance Scale Score: 90%          Assessment/Plan   Diagnoses and all orders for this visit:    1. Hot flashes due to tamoxifen (Primary)    2. Fibromyalgia  -     Ambulatory Referral to Physical Medicine Rehab    Other orders  -     cloNIDine (Catapres) 0.1 MG tablet; Take 1 tablet by mouth 2 (Two) Times a Day for 30 days. For hot flashes.  Call for refills  Dispense: 60 tablet; Refill: 0  -     escitalopram (LEXAPRO) 5 MG tablet; Take 1 tablet by mouth Daily for 7 days, THEN 2 tablets Daily for 23 days.  Dispense: 53 tablet; Refill: 0            Prior authorization documentation:  Inefficacious: Venlafaxine not helpful for hot/ cold spells.  Intolerant to side effects: taxotere/ cytoxan.      Total face to face time spent: 30 mins.  Greater than 50% time spent in counseling and discussion re: instruction on AVS (if applicable).      Patient Instructions of AVS:  1.  Avoid caffeine and alcohol  2.  Vitamin E over the counter supplement can help  3.  Clonidine helps decrease frequency of hot flashes.  If used routinely, beware of rebound high blood pressure after discontinuation  4.  Belladona capsules as needed for hot flashes  5.  Trial switch from Effexor to Lexapro  6.  Consider increase Gabapentin to address hot  flashes as well  7.  Dr. Zabrina Jenkins at [facility] - Physical Medicine and Rehabilitation and nutrition based medicine    Care coordination:   Follow-up as needed.     Attending attestation:  History reviewed with HPM Fellow, Dr. Gómez.  I have reviewed documentation and agree with above.  I met with patient afterwards with Dr. Gómez and provided counseling and delineated plan of care re: additional pharmacologic strategies that she can discuss with her primary care provider.    Switched her from Effexor to Lexapro to see if better management of hot flashes.  Educated on potential dual role of Effexor for her fibromyalgia, so she may opt to go back to that if no significant improvement seen with the switch.     I prescribed her Belladona/Ergotamine compounded capsulses for hot flashes to local compounding Rx of her choice, and these can be refilled by her primary care.    Counseled also on dual benefit of gabapentin for hot flashes as well as her myofascial pain syndrome, and she can discuss this with her primary care.    Discussed adding nutrition and exercise strategies in general, which she expressed interest it.  I think she would benefit from consultation +/- management from Dr. Zabrina Jenkins at [facility] for her fibromyalgia.    I discussed my recommendations with Orly Masterson.    No scheduled palliative clinic f/u.  She may be referred back if future needs arise

## 2020-12-02 DIAGNOSIS — R23.2 HOT FLASHES DUE TO TAMOXIFEN: Primary | ICD-10-CM

## 2020-12-02 DIAGNOSIS — T45.1X5A HOT FLASHES DUE TO TAMOXIFEN: Primary | ICD-10-CM

## 2020-12-02 RX ORDER — CLONIDINE HYDROCHLORIDE 0.1 MG/1
0.1 TABLET ORAL 2 TIMES DAILY
Qty: 60 TABLET | Refills: 5 | Status: SHIPPED | OUTPATIENT
Start: 2020-12-02 | End: 2021-06-01

## 2020-12-02 RX ORDER — ESCITALOPRAM OXALATE 10 MG/1
10 TABLET ORAL DAILY
Qty: 30 TABLET | Refills: 11 | Status: SHIPPED | OUTPATIENT
Start: 2020-12-02 | End: 2022-03-04

## 2020-12-28 ENCOUNTER — TELEPHONE (OUTPATIENT)
Dept: ONCOLOGY | Facility: CLINIC | Age: 50
End: 2020-12-28

## 2020-12-28 NOTE — TELEPHONE ENCOUNTER
Caller: caio    Relationship to patient: self    Best call back number: [phone]    Pt is calling to resched her appt on 1/19/21 due to her mammogram lab being resched. Pt prefers the week of 1/25/21. Unable to resched within hub timeframe.

## 2021-01-26 ENCOUNTER — OFFICE VISIT (OUTPATIENT)
Dept: ONCOLOGY | Facility: CLINIC | Age: 51
End: 2021-01-26

## 2021-01-26 VITALS
BODY MASS INDEX: 28.28 KG/M2 | RESPIRATION RATE: 18 BRPM | HEIGHT: 66 IN | WEIGHT: 176 LBS | SYSTOLIC BLOOD PRESSURE: 142 MMHG | HEART RATE: 98 BPM | DIASTOLIC BLOOD PRESSURE: 95 MMHG | TEMPERATURE: 98.2 F

## 2021-01-26 DIAGNOSIS — Z17.0 MALIGNANT NEOPLASM OF UPPER-OUTER QUADRANT OF LEFT BREAST IN FEMALE, ESTROGEN RECEPTOR POSITIVE (HCC): Primary | ICD-10-CM

## 2021-01-26 DIAGNOSIS — C50.412 MALIGNANT NEOPLASM OF UPPER-OUTER QUADRANT OF LEFT BREAST IN FEMALE, ESTROGEN RECEPTOR POSITIVE (HCC): Primary | ICD-10-CM

## 2021-01-26 PROCEDURE — 99215 OFFICE O/P EST HI 40 MIN: CPT | Performed by: INTERNAL MEDICINE

## 2021-01-26 NOTE — PROGRESS NOTES
CHIEF COMPLAINT: Follow-up breast cancer on adjuvant tamoxifen.    Problem List:  Oncology/Hematology History Overview Note   1. Left breast cancer: She had breast cancer biopsied 2/6/18 by Dr. marquez showing infiltrating ductal carcinoma [place] 6 out of 9, 60% 1+ HER-2/magaly, ER greater than 95% 2+, [place] 95% 2-3+.  On 3/13/18 she had lumpectomy for a 2.5 cm 0 out of one grade 2 infiltrating lobular carcinoma.  Mammaprint came back with a 10% distant risk of recurrence on hormonal blockade alone.  Because of the tumor over 2 cm in size, Dr. Tao recommended Taxotere and Cytoxan but after the second course of therapy she had a severe allergic reaction with hives.  She received 30 radiation treatments, completed 07/2018 and, given a history of DEANA without BSO for fibroids in 2012, Dr. Toya Carrillo due to her hormonal levels and found her to be premenopausal.  Hence she was started on tamoxifen for which she has been taking gabapentin for hot flashes.  Still having a lot of dermatographia is.  Has a hiatal hernia as well as a hearing loss since that time.  She came to me for the first time 10/9/18 asking me to assume her care.  She was concerned regarding some hip pain for which she had been told in the past there was some tumor in the abdomen and seeing orthopedics for was told she did not need to do anything about it.  She also had hip injections for spinal stenosis with neurology and movable.  I asked her to get her prior bone imaging reports and discs and I'm getting a total body bone scan.  She had previous genetic testing August 2018 negative for BRCA and she is going to get those reports for rest of review what genetic test were done.  Assuming the bone scan is unremarkable and the outside imaging does not suggest any other worrisome bony abnormalities and I certainly think at least 5 years of tamoxifen is reasonable.  She will continue with the gabapentin.     Per records from Dr. Dr. Tao, she  completed radiation to the left breast 7/30/18.  Because of shortness of breath with pleurisy, CT PE protocol 5/8/18 was done and no PE found.  ENT evaluation 5/17/18 with flexible laryngoscopy showed widely patent airway and prednisone was tapered post hypersensitivity reaction to the Taxotere.  Per note from . Dr. Tao of 8/27/18, BRCA1 and BRCA2 mutation analysis was ordered.  This was done with [employer] which showed no clinically significant mutation but there was a variant of uncertain significance found.  I recommended repeating this with cancernext panel for more thorough evaluation.  According to a note from her primary care in 2015 there was mention of an MRI showing no change in a thigh lipoma which I suspect is the mass she is referring to.  I performed total body bone scan 10/23/18 which was entirely negative.  She does have a large mass in the superior lateral aspect of her left thigh which has not grown and likely lipomatous but given the size of this, I am referring her to Bob Parham for evaluation.  She brought her discs with her on her visit to see me on 11/13/18 and she will take those to Dr. Parham and then bring them back to me for us to load into our system for the future.  In the meantime I will get her to our genetic counselors for further testing and to have her monitored over time in the event that her variant of undetermined significance becomes one that is determined to be significant.         Malignant neoplasm of upper-outer quadrant of left breast in female, estrogen receptor positive (CMS/HCC)   2/6/2018 Initial Diagnosis    Malignant neoplasm of upper-outer quadrant of breast in female, estrogen receptor positive (CMS/HCC)     7/2/2018 -  Hormonal Therapy    Tamoxifen began after radiation     10/23/2018 Imaging    Total body bone scan     12/7/2018 Genetic Testing    Genetic testing Results were obtained from [employer] confirming that a 28 gene [employer] panel had  been performed, which included all high and moderate risk breast cancer genes as well as genes related to other cancer risks.  Two variants of uncertain significance (VUS) were identified, in the BARD1 and PMS2 genes.  The ClinVar database was referenced, which did not show any alternative interpretations of either VUS.          1/14/2020 Imaging    1/14/2020 mammogram BI-RADS VI with recommended unilateral follow-up left mammogram in July.     1/24/2020 Imaging     bone scan stable with no progressive abnormalities to explain left rib pain     12/10/2020 Imaging    DEXA bone density testing normal.     1/21/2021 Imaging    -1/21/2021 bilateral diagnostic mammogram BI-RADS 2.     Lipoma of left lower extremity   3/23/2015 Initial Diagnosis    Mass of left thigh     3/23/2015 Imaging    MRI left lower extremity Impression:  Abnormal left tensor fascial des muscle of uncertain significance.  That signal may represent a lipoma but is more infiltrative and unusual.  Other neoplastic process including liposarcoma cannot be excluded.  Mild contrast enhancement and air present muscle injury, muscle inflammation or less likely, neoplastic involvement.     1/17/2019 Imaging    MRI right gluteal region showed fatty tumor posteriorly in the fascia varun     2/4/2019 -  Other Event    Consultation with Dr. Bob Parham at the [facility] for left thigh lipoma,  recommended physical therapy for her snapping hip and trocar bursitis, in regard to the tumor no plans on surgically resecting.  Follow-up PRN.         HISTORY OF PRESENT ILLNESS:  The patient is a 50 y.o. female, here for follow up on management of adjuvant therapy breast cancer on tamoxifen.  BI-RADS 1 mineral to mammogram 1/21/2021    Past Medical History:   Diagnosis Date   • Breast cancer (CMS/HCC) 2018   • Diabetes mellitus (CMS/HCC)    • Disease of thyroid gland      Past Surgical History:   Procedure Laterality Date   • BREAST LUMPECTOMY Left  "02/2018   • KNEE ARTHROSCOPY  2004       Allergies   Allergen Reactions   • Codeine GI Intolerance   • Morphine GI Intolerance   • Taxotere [Docetaxel] Hives   • Cytoxan [Cyclophosphamide] Hives   • Zyrtec [Cetirizine] Rash       Family History and Social History reviewed and changed as necessary    REVIEW OF SYSTEM:   Chronic myalgias and joint pains following with rheumatology    PHYSICAL EXAM:  No live synovitis or specific bone trigger points.  Bilateral breast exam and node exam benign  Vitals:    01/26/21 1028   BP: 142/95   Pulse: 98   Resp: 18   Temp: 98.2 °F (36.8 °C)   Weight: 79.8 kg (176 lb)   Height: 167.6 cm (66\")     Vitals:    01/26/21 1028   PainSc:   6   PainLoc: Back  Comment: lower back and left hip          Vitals reviewed.    ECOG: (0) Fully Active - Able to Carry On All Pre-disease Performance Without Restriction    Lab Results   Component Value Date    HGB 14.9 10/09/2018    HCT 45.2 10/09/2018    MCV 83 10/09/2018     10/09/2018    WBC 6.9 10/09/2018    NEUTROABS 4.5 10/09/2018    LYMPHSABS 1.9 10/09/2018    MONOSABS 0.3 10/09/2018    EOSABS 0.2 10/09/2018    BASOSABS 0.0 10/09/2018       Lab Results   Component Value Date    BUN 14 10/09/2018    CREATININE 0.71 10/09/2018     10/09/2018    K 4.5 10/09/2018     10/09/2018    CO2 19 (L) 10/09/2018    CALCIUM 10.0 10/09/2018    ALBUMIN 4.6 10/09/2018    BILITOT <0.2 10/09/2018    ALKPHOS 63 10/09/2018    AST 20 10/09/2018    ALT 18 10/09/2018             ASSESSMENT & PLAN:  1.  T2N0 hormone receptor positive breast cancer status post 2 courses of Taxotere Cytoxan with Dr. Tao and subsequent tamoxifen due to premenopausal status per gynecologist with severe intolerance of tamoxifen  2. Severe hot flashes  3. Severe myalgias with \"fibromyalgia predating tamoxifen but worse since tamoxifen  4. Severe arthralgias and joint stiffness where she can barely move in the mornings in her hands and feet    Discussion: She will " continue tamoxifen 10 mg twice a day and has plenty of refills.  We will get her next mammogram in a year and she will see my nurse practitioner in 6 months with CMP just prior to return to make sure that her liver enzymes on the tamoxifen are getting along.  Her mammogram BI-RADS [place] 2 bilaterally and bilateral breast exam today was benign.  No adenopathy.  She fell at home either.  Her main issue is severe prophylaxis and she has been to palliative care as well seeing rheumatology and she is doing the best she can but unfortunately no magic for the significant hot flashes but we will stop tamoxifen at 5 years which is July 2023.    This visit addresses a chronic illness that poses a threat to life on drug therapy requiring intensive monitoring for toxicity and warrants a level 5 MDM.      Derrick Terry MD    01/26/2021

## 2021-05-31 DIAGNOSIS — R23.2 HOT FLASHES DUE TO TAMOXIFEN: ICD-10-CM

## 2021-05-31 DIAGNOSIS — T45.1X5A HOT FLASHES DUE TO TAMOXIFEN: ICD-10-CM

## 2021-06-01 RX ORDER — CLONIDINE HYDROCHLORIDE 0.1 MG/1
TABLET ORAL
Qty: 180 TABLET | Refills: 0 | Status: SHIPPED | OUTPATIENT
Start: 2021-06-01 | End: 2022-05-02

## 2021-07-04 DIAGNOSIS — C50.412 MALIGNANT NEOPLASM OF UPPER-OUTER QUADRANT OF LEFT BREAST IN FEMALE, ESTROGEN RECEPTOR POSITIVE (HCC): ICD-10-CM

## 2021-07-04 DIAGNOSIS — Z17.0 MALIGNANT NEOPLASM OF UPPER-OUTER QUADRANT OF LEFT BREAST IN FEMALE, ESTROGEN RECEPTOR POSITIVE (HCC): ICD-10-CM

## 2021-07-06 RX ORDER — TAMOXIFEN CITRATE 10 MG/1
TABLET ORAL
Qty: 180 TABLET | Refills: 3 | Status: SHIPPED | OUTPATIENT
Start: 2021-07-06 | End: 2022-02-01 | Stop reason: SDUPTHER

## 2021-07-28 ENCOUNTER — OFFICE VISIT (OUTPATIENT)
Dept: ONCOLOGY | Facility: CLINIC | Age: 51
End: 2021-07-28

## 2021-07-28 VITALS
DIASTOLIC BLOOD PRESSURE: 83 MMHG | RESPIRATION RATE: 18 BRPM | BODY MASS INDEX: 27.64 KG/M2 | HEART RATE: 79 BPM | TEMPERATURE: 97.6 F | SYSTOLIC BLOOD PRESSURE: 125 MMHG | WEIGHT: 172 LBS | OXYGEN SATURATION: 98 % | HEIGHT: 66 IN

## 2021-07-28 DIAGNOSIS — R10.84 GENERALIZED ABDOMINAL PAIN: ICD-10-CM

## 2021-07-28 DIAGNOSIS — M89.8X9 BONE PAIN: ICD-10-CM

## 2021-07-28 DIAGNOSIS — C50.412 MALIGNANT NEOPLASM OF UPPER-OUTER QUADRANT OF LEFT BREAST IN FEMALE, ESTROGEN RECEPTOR POSITIVE (HCC): Primary | ICD-10-CM

## 2021-07-28 DIAGNOSIS — R11.0 NAUSEA: ICD-10-CM

## 2021-07-28 DIAGNOSIS — Z17.0 MALIGNANT NEOPLASM OF UPPER-OUTER QUADRANT OF LEFT BREAST IN FEMALE, ESTROGEN RECEPTOR POSITIVE (HCC): Primary | ICD-10-CM

## 2021-07-28 PROCEDURE — 99214 OFFICE O/P EST MOD 30 MIN: CPT | Performed by: NURSE PRACTITIONER

## 2021-07-28 RX ORDER — GABAPENTIN 600 MG/1
TABLET ORAL
COMMUNITY
Start: 2021-07-27 | End: 2022-07-31

## 2021-07-28 RX ORDER — FAMOTIDINE 20 MG/1
20 TABLET, FILM COATED ORAL 2 TIMES DAILY
COMMUNITY
Start: 2021-05-31 | End: 2022-02-01 | Stop reason: ALTCHOICE

## 2021-07-28 RX ORDER — SEMAGLUTIDE 1.34 MG/ML
INJECTION, SOLUTION SUBCUTANEOUS
COMMUNITY
Start: 2021-07-21 | End: 2022-04-21 | Stop reason: SDUPTHER

## 2021-07-28 NOTE — PROGRESS NOTES
CHIEF COMPLAINT:  1.  Breast cancer  2.  Hot flashes   3.  Abdominal pain  4.  LE bone pain    Problem List:  Oncology/Hematology History Overview Note   1. Left breast cancer: She had breast cancer biopsied 2/6/18 by Dr. marquez showing infiltrating ductal carcinoma [place] 6 out of 9, 60% 1+ HER-2/magaly, ER greater than 95% 2+, [place] 95% 2-3+.  On 3/13/18 she had lumpectomy for a 2.5 cm 0 out of one grade 2 infiltrating lobular carcinoma.  Mammaprint came back with a 10% distant risk of recurrence on hormonal blockade alone.  Because of the tumor over 2 cm in size, Dr. Tao recommended Taxotere and Cytoxan but after the second course of therapy she had a severe allergic reaction with hives.  She received 30 radiation treatments, completed 07/2018 and, given a history of DEANA without BSO for fibroids in 2012, Dr. Toya Carrillo due to her hormonal levels and found her to be premenopausal.  Hence she was started on tamoxifen for which she has been taking gabapentin for hot flashes.  Still having a lot of dermatographia is.  Has a hiatal hernia as well as a hearing loss since that time.  She came to me for the first time 10/9/18 asking me to assume her care.  She was concerned regarding some hip pain for which she had been told in the past there was some tumor in the abdomen and seeing orthopedics for was told she did not need to do anything about it.  She also had hip injections for spinal stenosis with neurology and movable.  I asked her to get her prior bone imaging reports and discs and I'm getting a total body bone scan.  She had previous genetic testing August 2018 negative for BRCA and she is going to get those reports for rest of review what genetic test were done.  Assuming the bone scan is unremarkable and the outside imaging does not suggest any other worrisome bony abnormalities and I certainly think at least 5 years of tamoxifen is reasonable.  She will continue with the gabapentin.     Per records  from Dr. Dr. Tao, she completed radiation to the left breast 7/30/18.  Because of shortness of breath with pleurisy, CT PE protocol 5/8/18 was done and no PE found.  ENT evaluation 5/17/18 with flexible laryngoscopy showed widely patent airway and prednisone was tapered post hypersensitivity reaction to the Taxotere.  Per note from . Dr. Tao of 8/27/18, BRCA1 and BRCA2 mutation analysis was ordered.  This was done with [employer] which showed no clinically significant mutation but there was a variant of uncertain significance found.  I recommended repeating this with cancernext panel for more thorough evaluation.  According to a note from her primary care in 2015 there was mention of an MRI showing no change in a thigh lipoma which I suspect is the mass she is referring to.  I performed total body bone scan 10/23/18 which was entirely negative.  She does have a large mass in the superior lateral aspect of her left thigh which has not grown and likely lipomatous but given the size of this, I am referring her to Bob Parham for evaluation.  She brought her discs with her on her visit to see me on 11/13/18 and she will take those to Dr. Parham and then bring them back to me for us to load into our system for the future.  In the meantime I will get her to our genetic counselors for further testing and to have her monitored over time in the event that her variant of undetermined significance becomes one that is determined to be significant.         Malignant neoplasm of upper-outer quadrant of left breast in female, estrogen receptor positive (CMS/HCC)   2/6/2018 Initial Diagnosis    Malignant neoplasm of upper-outer quadrant of breast in female, estrogen receptor positive (CMS/HCC)     7/2/2018 -  Hormonal Therapy    Tamoxifen began after radiation     10/23/2018 Imaging    Total body bone scan     12/7/2018 Genetic Testing    Genetic testing Results were obtained from [employer] confirming that a  28 gene [facility] panel had been performed, which included all high and moderate risk breast cancer genes as well as genes related to other cancer risks.  Two variants of uncertain significance (VUS) were identified, in the BARD1 and PMS2 genes.  The ClinVar database was referenced, which did not show any alternative interpretations of either VUS.          1/14/2020 Imaging    1/14/2020 mammogram BI-RADS VI with recommended unilateral follow-up left mammogram in July.     1/24/2020 Imaging     bone scan stable with no progressive abnormalities to explain left rib pain     12/10/2020 Imaging    DEXA bone density testing normal.     1/21/2021 Imaging    -1/21/2021 bilateral diagnostic mammogram BI-RADS 2.     Lipoma of left lower extremity   3/23/2015 Initial Diagnosis    Mass of left thigh     3/23/2015 Imaging    MRI left lower extremity Impression:  Abnormal left tensor fascial des muscle of uncertain significance.  That signal may represent a lipoma but is more infiltrative and unusual.  Other neoplastic process including liposarcoma cannot be excluded.  Mild contrast enhancement and air present muscle injury, muscle inflammation or less likely, neoplastic involvement.     1/17/2019 Imaging    MRI right gluteal region showed fatty tumor posteriorly in the fascia varun     2/4/2019 -  Other Event    Consultation with Dr. Bob Parham at the [facility] for left thigh lipoma,  recommended physical therapy for her snapping hip and trocar bursitis, in regard to the tumor no plans on surgically resecting.  Follow-up PRN.         HISTORY OF PRESENT ILLNESS:  The patient is a 51 y.o. female, here for follow up on management of adjuvant therapy breast cancer on tamoxifen.  She continues to have hot flashes but has learned basically to tolerate them.  Over the last 6 months she reports that she has been working diligently on her diet and exercise as her hemoglobin A1c has been greater than 8.  She has been on  "a keto diet for the last 6 months cutting out all sugars and very limited carbohydrate intake.  She is disappointed as her hemoglobin A1c still remains above 8 despite these changes.  She was started on Ozempic, she had her first injection earlier this week.  She has intermittent abdominal pain and occasional nausea.  She also reports bone pain particularly in her lower extremities.  She reports this has gotten worse over the past few months.  Continues to follow with Dr. Serrano regarding her fibromyalgia.  She had a follow-up with Dr. Jennings in June with negative breast exam.  She has no new or concerning findings on breast self-exam.  She is up-to-date on mammography, next due January.    Past Medical History:   Diagnosis Date   • Breast cancer (CMS/HCC) 2018   • Diabetes mellitus (CMS/HCC)    • Disease of thyroid gland      Past Surgical History:   Procedure Laterality Date   • BREAST LUMPECTOMY Left 02/2018   • KNEE ARTHROSCOPY  2004       Allergies   Allergen Reactions   • Codeine GI Intolerance   • Morphine GI Intolerance   • Taxotere [Docetaxel] Hives   • Cytoxan [Cyclophosphamide] Hives   • Zyrtec [Cetirizine] Rash       Family History and Social History reviewed and changed as necessary    REVIEW OF SYSTEM:   Positive for chronic myalgias and joint pains following with rheumatology  Positive for worsening and persistent bone pain in the lower extremities  Positive for intermittent abdominal pain and nausea, hyperglycemia      PHYSICAL EXAM:  Abdomen: Soft, nontender, nondistended  Breast: Deferred  Nodes:  No palpable lymphadenopathy    Vitals:    07/28/21 1121   BP: 125/83   Pulse: 79   Resp: 18   Temp: 97.6 °F (36.4 °C)   SpO2: 98%   Weight: 78 kg (172 lb)   Height: 167.6 cm (66\")     Vitals:    07/28/21 1121   PainSc: 0-No pain          Vitals reviewed.    ECOG: (0) Fully Active - Able to Carry On All Pre-disease Performance Without Restriction    Lab Results   Component Value Date    HGB 14.9 " "10/09/2018    HCT 45.2 10/09/2018    MCV 83 10/09/2018     10/09/2018    WBC 6.9 10/09/2018    NEUTROABS 4.5 10/09/2018    LYMPHSABS 1.9 10/09/2018    MONOSABS 0.3 10/09/2018    EOSABS 0.2 10/09/2018    BASOSABS 0.0 10/09/2018       Lab Results   Component Value Date    BUN 14 10/09/2018    CREATININE 0.71 10/09/2018     10/09/2018    K 4.5 10/09/2018     10/09/2018    CO2 19 (L) 10/09/2018    CALCIUM 10.0 10/09/2018    ALBUMIN 4.6 10/09/2018    BILITOT <0.2 10/09/2018    ALKPHOS 63 10/09/2018    AST 20 10/09/2018    ALT 18 10/09/2018             ASSESSMENT & PLAN:  1.  T2N0 hormone receptor positive breast cancer status post 2 courses of Taxotere Cytoxan with Dr. Tao and subsequent tamoxifen due to premenopausal status per gynecologist with severe intolerance of tamoxifen  2. Severe hot flashes  3. Severe myalgias with \"fibromyalgia predating tamoxifen but worse since tamoxifen  4. Abdominal pain and nausea  5. Bone pain    Discussion: No evidence of recurrent breast cancer on exam.  She is having some symptoms however that are concerning.  She has worsening bone pain particularly in her lower extremities, I will get a bone scan for further evaluation.  She also has intermittent abdominal pain and nausea.  She has a persistently high hemoglobin A1c despite cutting out sugar from her diet and being very carbohydrate restricted.  I will get a CT scan of her abdomen and pelvis.  I will call her with the results of both of these.  Assuming these are negative then we will plan on seeing her back for follow-up in 6 months.  She states that she may see an endocrinologist that one of her friends has recommended for her hyperglycemia.  Hopefully the Otezla will help.    She will continue tamoxifen 10 mg twice a day, she did not need a refill.  We will get her next annual mammogram in January and I have ordered that today.  Her liver enzymes were normal on recent blood work.  We will stop tamoxifen " at 5 years which is July 2023.    Return to clinic in 6 months for follow-up, after her mammogram.  If she is doing well at that time we can then go to annual follow-ups as she sees Dr. Jennings in the summer therefore we could see her in the winter.    This was a level 4, moderate MDM visit With 1 or more chronic illnesses with management of side effects of therapy, to undiagnosed new problems with uncertain prognosis with abdominal pain, bone pain, ordering of bone scan and CT scans for evaluation, review of labs from 7/13/2021 and ordering of future mammogram.    Orly Masterson, APRN    07/28/2021

## 2021-08-10 ENCOUNTER — HOSPITAL ENCOUNTER (OUTPATIENT)
Dept: NUCLEAR MEDICINE | Facility: HOSPITAL | Age: 51
Discharge: HOME | End: 2021-08-10

## 2021-08-10 ENCOUNTER — HOSPITAL ENCOUNTER (OUTPATIENT)
Dept: CT IMAGING | Facility: HOSPITAL | Age: 51
Discharge: HOME | End: 2021-08-10
Admitting: NURSE PRACTITIONER

## 2021-08-10 DIAGNOSIS — R10.84 GENERALIZED ABDOMINAL PAIN: ICD-10-CM

## 2021-08-10 DIAGNOSIS — Z17.0 MALIGNANT NEOPLASM OF UPPER-OUTER QUADRANT OF LEFT BREAST IN FEMALE, ESTROGEN RECEPTOR POSITIVE (HCC): ICD-10-CM

## 2021-08-10 DIAGNOSIS — M89.8X9 BONE PAIN: ICD-10-CM

## 2021-08-10 DIAGNOSIS — C50.412 MALIGNANT NEOPLASM OF UPPER-OUTER QUADRANT OF LEFT BREAST IN FEMALE, ESTROGEN RECEPTOR POSITIVE (HCC): ICD-10-CM

## 2021-08-10 DIAGNOSIS — R11.0 NAUSEA: ICD-10-CM

## 2021-08-10 PROCEDURE — 0 TECHNETIUM MEDRONATE KIT: Performed by: NURSE PRACTITIONER

## 2021-08-10 PROCEDURE — 25010000002 IOPAMIDOL 61 % SOLUTION: Performed by: NURSE PRACTITIONER

## 2021-08-10 PROCEDURE — 82565 ASSAY OF CREATININE: CPT

## 2021-08-10 PROCEDURE — A9503 TC99M MEDRONATE: HCPCS | Performed by: NURSE PRACTITIONER

## 2021-08-10 PROCEDURE — 78306 BONE IMAGING WHOLE BODY: CPT

## 2021-08-10 PROCEDURE — 74177 CT ABD & PELVIS W/CONTRAST: CPT

## 2021-08-10 RX ORDER — TC 99M MEDRONATE 20 MG/10ML
25.9 INJECTION, POWDER, LYOPHILIZED, FOR SOLUTION INTRAVENOUS
Status: COMPLETED | OUTPATIENT
Start: 2021-08-10 | End: 2021-08-10

## 2021-08-10 RX ADMIN — IOPAMIDOL 85 ML: 612 INJECTION, SOLUTION INTRAVENOUS at 13:05

## 2021-08-10 RX ADMIN — Medication 25.9 MILLICURIE: at 11:30

## 2021-08-12 LAB — CREAT BLDA-MCNC: 0.7 MG/DL (ref 0.6–1.3)

## 2022-02-01 ENCOUNTER — TELEMEDICINE (OUTPATIENT)
Dept: ONCOLOGY | Facility: CLINIC | Age: 52
End: 2022-02-01

## 2022-02-01 VITALS — HEIGHT: 66 IN | BODY MASS INDEX: 27.76 KG/M2

## 2022-02-01 DIAGNOSIS — Z17.0 MALIGNANT NEOPLASM OF UPPER-OUTER QUADRANT OF LEFT BREAST IN FEMALE, ESTROGEN RECEPTOR POSITIVE: Primary | ICD-10-CM

## 2022-02-01 DIAGNOSIS — C50.412 MALIGNANT NEOPLASM OF UPPER-OUTER QUADRANT OF LEFT BREAST IN FEMALE, ESTROGEN RECEPTOR POSITIVE: Primary | ICD-10-CM

## 2022-02-01 PROCEDURE — 99215 OFFICE O/P EST HI 40 MIN: CPT | Performed by: INTERNAL MEDICINE

## 2022-02-01 RX ORDER — LEVOTHYROXINE SODIUM 50 MCG
TABLET ORAL
COMMUNITY
Start: 2021-12-14 | End: 2022-12-21

## 2022-02-01 RX ORDER — PANTOPRAZOLE SODIUM 40 MG/1
TABLET, DELAYED RELEASE ORAL
COMMUNITY
Start: 2021-12-06 | End: 2022-04-13

## 2022-02-01 RX ORDER — TAMOXIFEN CITRATE 10 MG/1
10 TABLET ORAL 2 TIMES DAILY
Qty: 180 TABLET | Refills: 3 | Status: SHIPPED | OUTPATIENT
Start: 2022-02-01 | End: 2022-06-17

## 2022-02-01 NOTE — PROGRESS NOTES
This was an audio and video enabled telemedicine encounter.  Done for COVID-19 risk reduction.  Verbal consent given.    CHIEF COMPLAINT: Follow-up left breast cancer    Problem List:  Oncology/Hematology History Overview Note   1.  Left breast cancer: She had breast cancer biopsied 2/6/18 by Dr. marquez showing infiltrating ductal carcinoma [place] 6 out of 9, 60% 1+ HER-2/magaly, ER greater than 95% 2+, [place] 95% 2-3+.  On 3/13/18 she had lumpectomy for a 2.5 cm 0 out of one grade 2 infiltrating lobular carcinoma.  Mammaprint came back with a 10% distant risk of recurrence on hormonal blockade alone.  Because of the tumor over 2 cm in size, Dr. Tao recommended Taxotere and Cytoxan but after the second course of therapy she had a severe allergic reaction with hives.  She received 30 radiation treatments, completed 07/2018 and, given a history of DEANA without BSO for fibroids in 2012, Dr. Toya Carrillo due to her hormonal levels and found her to be premenopausal.  Hence she was started on tamoxifen for which she has been taking gabapentin for hot flashes.  Still having a lot of dermatographia is.  Has a hiatal hernia as well as a hearing loss since that time.  She came to me for the first time 10/9/18 asking me to assume her care.  She was concerned regarding some hip pain for which she had been told in the past there was some tumor in the abdomen and seeing orthopedics for was told she did not need to do anything about it.  She also had hip injections for spinal stenosis with neurology and movable.  I asked her to get her prior bone imaging reports and discs and I'm getting a total body bone scan.  She had previous genetic testing August 2018 negative for BRCA and she is going to get those reports for rest of review what genetic test were done.  Assuming the bone scan is unremarkable and the outside imaging does not suggest any other worrisome bony abnormalities and I certainly think at least 5 years of  tamoxifen is reasonable.  She will continue with the gabapentin.     Per records from Dr. Dr. Tao, she completed radiation to the left breast 7/30/18.  Because of shortness of breath with pleurisy, CT PE protocol 5/8/18 was done and no PE found.  ENT evaluation 5/17/18 with flexible laryngoscopy showed widely patent airway and prednisone was tapered post hypersensitivity reaction to the Taxotere.  Per note from . Dr. Tao of 8/27/18, BRCA1 and BRCA2 mutation analysis was ordered.  This was done with [employer] which showed no clinically significant mutation but there was a variant of uncertain significance found.  I recommended repeating this with cancernext panel for more thorough evaluation.  According to a note from her primary care in 2015 there was mention of an MRI showing no change in a thigh lipoma which I suspect is the mass she is referring to.  I performed total body bone scan 10/23/18 which was entirely negative.  She does have a large mass in the superior lateral aspect of her left thigh which has not grown and likely lipomatous but given the size of this, I am referring her to Bob Parham for evaluation.  She brought her discs with her on her visit to see me on 11/13/18 and she will take those to Dr. Parham and then bring them back to me for us to load into our system for the future.  In the meantime I will get her to our genetic counselors for further testing and to have her monitored over time in the event that her variant of undetermined significance becomes one that is determined to be significant.      -12/7/2018 [employer] 28 gene myRisk panel showed 2 variants of uncertain significance in BARD 1 and PMS 2    -2/4/2019 consultation Dr. Bob Parham showed left hip/thigh lipoma.  He recommended no surgery and just physical therapy for trochanteric bursitis.    -12/10/2020 follow-up with Dr. Serrano for fibromyalgia and osteoarthritis.    -7/28/2021 [facility]  oncology APRN follow-up visit: No evidence of recurrent breast cancer on exam.  She is having some symptoms however that are concerning.  She has worsening bone pain particularly in her lower extremities, I will get a bone scan for further evaluation.  She also has intermittent abdominal pain and nausea.  She has a persistently high hemoglobin A1c despite cutting out sugar from her diet and being very carbohydrate restricted.  I will get a CT scan of her abdomen and pelvis.  I will call her with the results of both of these.  Assuming these are negative then we will plan on seeing her back for follow-up in 6 months.  She states that she may see an endocrinologist that one of her friends has recommended for her hyperglycemia.  Hopefully the Otezla will help.     She will continue tamoxifen 10 mg twice a day, she did not need a refill.  We will get her next annual mammogram in January and I have ordered that today.  Her liver enzymes were normal on recent blood work.  We will stop tamoxifen at 5 years which is July 2023.     Return to clinic in 6 months for follow-up, after her mammogram.  If she is doing well at that time we can then go to annual follow-ups as she sees Dr. Jennings in the summer therefore we could see her in the winter.     -8/10/2021 CT abdomen pelvis with contrast showed no obstruction or other causes for abdominal pain.  Right 4 mm inferior pole nephrolithiasis.  Hepatic steatosis.  Total body bone scan done because of leg pain showed degenerative joint disease of the cervical and upper thoracic spine only minimally increased compared to January 2020 with stable low level degenerative joint disease elsewhere and no suspicious metastases.    -1/24/2022 right breast ultrasound and bilateral diagnostic mammogram [facility] BI-RADS 2 with recommended diagnostic mammogram in 12 months bilateral.      -2/1/2022 [facility] medical oncology follow-up telehealth visit: Though rapid test negative, she  has had some URI symptoms that had her concerned so she did not want to come in and expose her cancer patient so we did a virtual visit.  Other than for her URI symptoms which are modest and free of fevers she has no other complaints.  She has lost a lot of weight on a keto plan.  We will continue tamoxifen through at least the summer 2023.  We will get mammogram again prior to return in a year and in the meantime I will get her breast cancer index per her request and if she has high benefit then she may be willing to put up with another couple of years of tamoxifen but if she continues to suffer from hot flashes, she is not sure if she will take extended adjuvant. Normal liver enzymes July 2021.  She will get gynecologic and physical breast exam with her gynecologist or primary care within the next 6 months.     Malignant neoplasm of upper-outer quadrant of left breast in female, estrogen receptor positive (HCC)   2/6/2018 Initial Diagnosis    Malignant neoplasm of upper-outer quadrant of breast in female, estrogen receptor positive (CMS/HCC)     7/2/2018 -  Hormonal Therapy    Tamoxifen began after radiation     10/23/2018 Imaging    Total body bone scan     12/7/2018 Genetic Testing    Genetic testing Results were obtained from [employer] confirming that a 28 gene [employer] panel had been performed, which included all high and moderate risk breast cancer genes as well as genes related to other cancer risks.  Two variants of uncertain significance (VUS) were identified, in the BARD1 and PMS2 genes.  The ClinVar database was referenced, which did not show any alternative interpretations of either VUS.          1/14/2020 Imaging    1/14/2020 mammogram BI-RADS VI with recommended unilateral follow-up left mammogram in July.     1/24/2020 Imaging     bone scan stable with no progressive abnormalities to explain left rib pain     12/10/2020 Imaging    DEXA bone density testing normal.     1/21/2021 Imaging     -1/21/2021 bilateral diagnostic mammogram BI-RADS 2.     Lipoma of left lower extremity   3/23/2015 Initial Diagnosis    Mass of left thigh     3/23/2015 Imaging    MRI left lower extremity Impression:  Abnormal left tensor fascial des muscle of uncertain significance.  That signal may represent a lipoma but is more infiltrative and unusual.  Other neoplastic process including liposarcoma cannot be excluded.  Mild contrast enhancement and air present muscle injury, muscle inflammation or less likely, neoplastic involvement.     1/17/2019 Imaging    MRI right gluteal region showed fatty tumor posteriorly in the fascia varun     2/4/2019 -  Other Event    Consultation with Dr. Bob Parham at the [facility] for left thigh lipoma,  recommended physical therapy for her snapping hip and trocar bursitis, in regard to the tumor no plans on surgically resecting.  Follow-up PRN.         HISTORY OF PRESENT ILLNESS:  The patient is a 51 y.o. female, here for follow up on management of adjuvant therapy breast cancer.  Purposeful weight loss by keto diet.  Some upper respiratory congestion without fevers.  No other complaints    Past Medical History:   Diagnosis Date   • Breast cancer (HCC) 2018   • Diabetes mellitus (HCC)    • Disease of thyroid gland      Past Surgical History:   Procedure Laterality Date   • BREAST LUMPECTOMY Left 02/2018   • KNEE ARTHROSCOPY  2004       Allergies   Allergen Reactions   • Codeine GI Intolerance   • Morphine GI Intolerance   • Hydrocodone-Acetaminophen Itching   • Taxotere [Docetaxel] Hives   • Cytoxan [Cyclophosphamide] Hives   • Fluticasone Other (See Comments)   • Eliana Other (See Comments)   • Vilanterol Other (See Comments)   • Zyrtec [Cetirizine] Rash       Family History and Social History reviewed and changed as necessary    REVIEW OF SYSTEM:   No new somatic complaints    PHYSICAL EXAM:  Unable to do breast exam but no visible respiratory distress or jaundice or  "icterus    Vitals:    02/01/22 1519   Height: 167.6 cm (66\")     There were no vitals filed for this visit.       ECOG score: 0           Vitals reviewed.        Lab Results   Component Value Date    HGB 14.9 10/09/2018    HCT 45.2 10/09/2018    MCV 83 10/09/2018     10/09/2018    WBC 6.9 10/09/2018    NEUTROABS 4.5 10/09/2018    LYMPHSABS 1.9 10/09/2018    MONOSABS 0.3 10/09/2018    EOSABS 0.2 10/09/2018    BASOSABS 0.0 10/09/2018       Lab Results   Component Value Date    GLUCOSE 137 (H) 10/09/2018    BUN 14 10/09/2018    CREATININE 0.70 08/10/2021     10/09/2018    K 4.5 10/09/2018     10/09/2018    CO2 19 (L) 10/09/2018    CALCIUM 10.0 10/09/2018    ALBUMIN 4.6 10/09/2018    BILITOT <0.2 10/09/2018    ALKPHOS 63 10/09/2018    AST 20 10/09/2018    ALT 18 10/09/2018             ASSESSMENT & PLAN:  1.  Left breast cancer: She had breast cancer biopsied 2/6/18 by Dr. marquez showing infiltrating ductal carcinoma [place] 6 out of 9, 60% 1+ HER-2/magaly, ER greater than 95% 2+, [place] 95% 2-3+.  On 3/13/18 she had lumpectomy for a 2.5 cm 0 out of one grade 2 infiltrating lobular carcinoma.  Mammaprint came back with a 10% distant risk of recurrence on hormonal blockade alone.  Because of the tumor over 2 cm in size, Dr. Tao recommended Taxotere and Cytoxan but after the second course of therapy she had a severe allergic reaction with hives.  She received 30 radiation treatments, completed 07/2018 and, given a history of DEANA without BSO for fibroids in 2012, Dr. Toya Carrillo due to her hormonal levels and found her to be premenopausal.  Hence she was started on tamoxifen for which she has been taking gabapentin for hot flashes.  Still having a lot of dermatographia is.  Has a hiatal hernia as well as a hearing loss since that time.  She came to me for the first time 10/9/18 asking me to assume her care.  She was concerned regarding some hip pain for which she had been told in the past there " was some tumor in the abdomen and seeing orthopedics for was told she did not need to do anything about it.  She also had hip injections for spinal stenosis with neurology and movable.  I asked her to get her prior bone imaging reports and discs and I'm getting a total body bone scan.  She had previous genetic testing August 2018 negative for BRCA and she is going to get those reports for rest of review what genetic test were done.  Assuming the bone scan is unremarkable and the outside imaging does not suggest any other worrisome bony abnormalities and I certainly think at least 5 years of tamoxifen is reasonable.  She will continue with the gabapentin.     Per records from Dr. Dr. Tao, she completed radiation to the left breast 7/30/18.  Because of shortness of breath with pleurisy, CT PE protocol 5/8/18 was done and no PE found.  ENT evaluation 5/17/18 with flexible laryngoscopy showed widely patent airway and prednisone was tapered post hypersensitivity reaction to the Taxotere.  Per note from . Dr. Tao of 8/27/18, BRCA1 and BRCA2 mutation analysis was ordered.  This was done with [employer] which showed no clinically significant mutation but there was a variant of uncertain significance found.  I recommended repeating this with cancernext panel for more thorough evaluation.  According to a note from her primary care in 2015 there was mention of an MRI showing no change in a thigh lipoma which I suspect is the mass she is referring to.  I performed total body bone scan 10/23/18 which was entirely negative.  She does have a large mass in the superior lateral aspect of her left thigh which has not grown and likely lipomatous but given the size of this, I am referring her to Bob Parham for evaluation.  She brought her discs with her on her visit to see me on 11/13/18 and she will take those to Dr. Parham and then bring them back to me for us to load into our system for the future.  In the meantime  I will get her to our genetic counselors for further testing and to have her monitored over time in the event that her variant of undetermined significance becomes one that is determined to be significant.      -12/7/2018 [employer] 28 gene myRisk panel showed 2 variants of uncertain significance in BARD 1 and PMS 2    -2/4/2019 consultation Dr. Bob Parham showed left hip/thigh lipoma.  He recommended no surgery and just physical therapy for trochanteric bursitis.    -12/10/2020 follow-up with Dr. Serrano for fibromyalgia and osteoarthritis.    -7/28/2021 [facility] medical oncology APRN follow-up visit: No evidence of recurrent breast cancer on exam.  She is having some symptoms however that are concerning.  She has worsening bone pain particularly in her lower extremities, I will get a bone scan for further evaluation.  She also has intermittent abdominal pain and nausea.  She has a persistently high hemoglobin A1c despite cutting out sugar from her diet and being very carbohydrate restricted.  I will get a CT scan of her abdomen and pelvis.  I will call her with the results of both of these.  Assuming these are negative then we will plan on seeing her back for follow-up in 6 months.  She states that she may see an endocrinologist that one of her friends has recommended for her hyperglycemia.  Hopefully the Otezla will help.     She will continue tamoxifen 10 mg twice a day, she did not need a refill.  We will get her next annual mammogram in January and I have ordered that today.  Her liver enzymes were normal on recent blood work.  We will stop tamoxifen at 5 years which is July 2023.     Return to clinic in 6 months for follow-up, after her mammogram.  If she is doing well at that time we can then go to annual follow-ups as she sees Dr. Jennings in the summer therefore we could see her in the winter.     -8/10/2021 CT abdomen pelvis with contrast showed no obstruction or other causes for abdominal  pain.  Right 4 mm inferior pole nephrolithiasis.  Hepatic steatosis.  Total body bone scan done because of leg pain showed degenerative joint disease of the cervical and upper thoracic spine only minimally increased compared to January 2020 with stable low level degenerative joint disease elsewhere and no suspicious metastases.    -1/24/2022 right breast ultrasound and bilateral diagnostic mammogram [place] [facility] BI-RADS 2 with recommended diagnostic mammogram in 12 months bilateral.    -2/1/2022 [facility] medical oncology follow-up telehealth visit: Though rapid test negative, she has had some URI symptoms that had her concerned so she did not want to come in and expose her cancer patient so we did a virtual visit.  Other than for her URI symptoms which are modest and free of fevers she has no other complaints.  She has lost a lot of weight on a keto plan.  We will continue tamoxifen through at least the summer 2023.  We will get mammogram again prior to return in a year and in the meantime I will get her breast cancer index per her request and if she has high benefit then she may be willing to put up with another couple of years of tamoxifen but if she continues to suffer from hot flashes, she is not sure if she will take extended adjuvant.  Normal liver enzymes July 2021.  She will get gynecologic and physical breast exam with her gynecologist or primary care within the next 6 months.    Total time of care today inclusive of time spent today prior to her arrival reviewing interval notes from my nurse practitioner and summarizing her genetic testing, consultation with Dr. Parham, consultation with rheumatology, follow-up with Dr. Jennings, interval CTs and bone scan last summer, and mammogram from January and during visit translating all that to her and going over her tamoxifen and tolerances thereof and plans for breast cancer index testing and after visit arranging all of this including follow-up  mammography took 40 minutes of total patient care time throughout the day today.      Derrick Terry MD    02/01/2022

## 2022-02-04 ENCOUNTER — TELEPHONE (OUTPATIENT)
Dept: ONCOLOGY | Facility: CLINIC | Age: 52
End: 2022-02-04

## 2022-02-04 NOTE — TELEPHONE ENCOUNTER
Received call from [employer] stating that they contacted patient regarding the [facility] testing and she had some questions so the test has been put on hold.  Called patient and clarified that Dr. Terry uses the results of this test  To determine the benefit of continuing tamoxifen past 5 years, patient verbalized understanding and states as long as her maximum OOP cost is $500 she agrees to proceed with testing. Called Alia, with [employer], back and left message informing her of this.

## 2022-03-04 DIAGNOSIS — T45.1X5A HOT FLASHES DUE TO TAMOXIFEN: ICD-10-CM

## 2022-03-04 DIAGNOSIS — R23.2 HOT FLASHES DUE TO TAMOXIFEN: ICD-10-CM

## 2022-03-04 RX ORDER — ESCITALOPRAM OXALATE 10 MG/1
TABLET ORAL
Qty: 90 TABLET | Refills: 3 | Status: SHIPPED | OUTPATIENT
Start: 2022-03-04 | End: 2023-02-02 | Stop reason: SDUPTHER

## 2022-04-13 RX ORDER — PANTOPRAZOLE SODIUM 40 MG/1
TABLET, DELAYED RELEASE ORAL
Qty: 90 TABLET | Refills: 1 | Status: SHIPPED | OUTPATIENT
Start: 2022-04-13 | End: 2022-10-31

## 2022-04-21 ENCOUNTER — OFFICE VISIT (OUTPATIENT)
Dept: FAMILY MEDICINE CLINIC | Facility: CLINIC | Age: 52
End: 2022-04-21

## 2022-04-21 VITALS
OXYGEN SATURATION: 99 % | SYSTOLIC BLOOD PRESSURE: 120 MMHG | WEIGHT: 149.9 LBS | BODY MASS INDEX: 23.53 KG/M2 | HEART RATE: 88 BPM | HEIGHT: 67 IN | DIASTOLIC BLOOD PRESSURE: 62 MMHG

## 2022-04-21 DIAGNOSIS — M54.50 ACUTE LOW BACK PAIN WITHOUT SCIATICA, UNSPECIFIED BACK PAIN LATERALITY: Primary | ICD-10-CM

## 2022-04-21 DIAGNOSIS — E03.9 ACQUIRED HYPOTHYROIDISM: ICD-10-CM

## 2022-04-21 DIAGNOSIS — F41.9 ANXIETY: ICD-10-CM

## 2022-04-21 DIAGNOSIS — E11.9 TYPE 2 DIABETES MELLITUS WITHOUT COMPLICATION, WITHOUT LONG-TERM CURRENT USE OF INSULIN: ICD-10-CM

## 2022-04-21 DIAGNOSIS — E55.9 VITAMIN D DEFICIENCY: ICD-10-CM

## 2022-04-21 DIAGNOSIS — G25.81 RLS (RESTLESS LEGS SYNDROME): ICD-10-CM

## 2022-04-21 PROBLEM — K21.9 GASTROESOPHAGEAL REFLUX DISEASE WITHOUT ESOPHAGITIS: Status: ACTIVE | Noted: 2021-10-20

## 2022-04-21 PROCEDURE — 99214 OFFICE O/P EST MOD 30 MIN: CPT | Performed by: FAMILY MEDICINE

## 2022-04-21 RX ORDER — ROPINIROLE 0.5 MG/1
0.5 TABLET, FILM COATED ORAL NIGHTLY
Qty: 30 TABLET | Refills: 0 | Status: SHIPPED | OUTPATIENT
Start: 2022-04-21 | End: 2022-05-18 | Stop reason: SDUPTHER

## 2022-04-21 RX ORDER — CELECOXIB 200 MG/1
200 CAPSULE ORAL DAILY
Qty: 90 CAPSULE | Refills: 1 | Status: SHIPPED | OUTPATIENT
Start: 2022-04-21 | End: 2023-01-03

## 2022-04-21 RX ORDER — SEMAGLUTIDE 1.34 MG/ML
1 INJECTION, SOLUTION SUBCUTANEOUS WEEKLY
Qty: 3 PEN | Refills: 1 | Status: SHIPPED | OUTPATIENT
Start: 2022-04-21 | End: 2022-10-10

## 2022-04-21 RX ORDER — CYCLOBENZAPRINE HCL 10 MG
10 TABLET ORAL 2 TIMES DAILY PRN
Qty: 180 TABLET | Refills: 1 | Status: SHIPPED | OUTPATIENT
Start: 2022-04-21 | End: 2022-10-10

## 2022-04-21 RX ORDER — LEVOCETIRIZINE DIHYDROCHLORIDE 5 MG/1
5 TABLET, FILM COATED ORAL EVERY EVENING
COMMUNITY

## 2022-04-21 NOTE — PROGRESS NOTES
Follow Up Office Visit      Date of Visit:  2022   Patient Name: Porsha Bolden  : 1970   MRN: [id number]     Chief Complaint:    Chief Complaint   Patient presents with   • Med Refill   • Diabetes       History of Present Illness: Porsha Bolden is a 52 y.o. female who is here today for follow up.      #1.  Acute low back pain without sciatica.  Patient recently was helping her mother with different chores and hurt her back.  She has a history of some low back pain a few years ago and it was very severe.  She had to have epidural steroid injections.  She also had to take some prescription medication for the discomfort.  Bothering her more since helping her mom with some different activities.  Started in the last few weeks.    #2.  Type 2 diabetes.  Patient due for blood work.  Over the last year she has lost a great deal of weight and has done very well with her diabetes.  Current management seems to be controlling the situation very well.    #3.  Hypothyroidism.  Condition has been stable.  Does need blood work.  She will let us know when she needs medication refills.    #4 anxiety.  Medications currently stable.    #5.  Restless leg syndrome.  Over the last few months she has had more more problems with questionable restless legs at night she has lost a great deal of weight with her diet.  Is possible there are some type of vitamin deficiency.  Unsure of etiology.    #6.  Vitamin D deficiency.  Due for blood work.  HPI      Subjective      Review of Systems:   Review of Systems   Constitutional: Negative for fever and unexpected weight loss.   Cardiovascular: Negative for chest pain.   Gastrointestinal: Positive for abdominal pain.   Genitourinary: Positive for pelvic pain. Negative for dysuria and urinary incontinence.   Musculoskeletal: Positive for back pain.   Neurological: Positive for weakness. Negative for numbness.       Past Medical History:   Past Medical History:    Diagnosis Date   • Breast cancer (HCC) 2018   • Diabetes mellitus (HCC)    • Disease of thyroid gland        Past Surgical History:   Past Surgical History:   Procedure Laterality Date   • BREAST LUMPECTOMY Left 02/2018   • HYSTERECTOMY      fibroid   • KNEE ARTHROSCOPY  2004   • KNEE SURGERY      x4   • REFRACTIVE SURGERY      Lasik       Family History:   Family History   Problem Relation Age of Onset   • Uterine cancer Mother    • Lung cancer Father    • Brain cancer Father 69   • Breast cancer Sister    • Brain cancer Paternal Grandmother        Social History:   Social History     Socioeconomic History   • Marital status:    Tobacco Use   • Smoking status: Never Smoker   • Smokeless tobacco: Never Used   Substance and Sexual Activity   • Alcohol use: Yes     Comment: twice per year       Medications:     Current Outpatient Medications:   •  celecoxib (CeleBREX) 200 MG capsule, Take 1 capsule by mouth Daily., Disp: 90 capsule, Rfl: 1  •  cloNIDine (CATAPRES) 0.1 MG tablet, TAKE 1 TABLET BY MOUTH TWICE DAILY FOR HOT FLASHES, Disp: 180 tablet, Rfl: 0  •  cyclobenzaprine (FLEXERIL) 10 MG tablet, Take 1 tablet by mouth 2 (Two) Times a Day As Needed for Muscle Spasms., Disp: 180 tablet, Rfl: 1  •  escitalopram (LEXAPRO) 10 MG tablet, Take 1 tablet by mouth once daily, Disp: 90 tablet, Rfl: 3  •  gabapentin (NEURONTIN) 600 MG tablet, , Disp: , Rfl:   •  JARDIANCE 25 MG tablet, , Disp: , Rfl:   •  levothyroxine (SYNTHROID, LEVOTHROID) 25 MCG tablet, , Disp: , Rfl:   •  metFORMIN (GLUCOPHAGE) 1000 MG tablet, Every 12 (Twelve) Hours., Disp: , Rfl:   •  Ozempic, 1 MG/DOSE, 4 MG/3ML solution pen-injector, Inject 1 mg under the skin into the appropriate area as directed 1 (One) Time Per Week., Disp: 3 pen, Rfl: 1  •  pantoprazole (PROTONIX) 40 MG EC tablet, TAKE 1 TABLET DAILY, Disp: 90 tablet, Rfl: 1  •  tamoxifen (NOLVADEX) 10 MG tablet, Take 1 tablet by mouth 2 (Two) Times a Day., Disp: 180 tablet, Rfl: 3  •   "traMADol (ULTRAM) 50 MG tablet, tramadol 50 mg tablet, Disp: , Rfl:   •  levocetirizine (XYZAL) 5 MG tablet, Take 5 mg by mouth Every Evening., Disp: , Rfl:   •  rOPINIRole (Requip) 0.5 MG tablet, Take 1 tablet by mouth Every Night. Take 1 hour before bedtime., Disp: 30 tablet, Rfl: 0  •  Synthroid 50 MCG tablet, , Disp: , Rfl:   •  tapentadol (NUCYNTA) 50 MG tablet, Take  by mouth., Disp: , Rfl:     Allergies:   Allergies   Allergen Reactions   • Codeine GI Intolerance   • Morphine GI Intolerance   • Hydrocodone-Acetaminophen Itching   • Taxotere [Docetaxel] Hives   • Cytoxan [Cyclophosphamide] Hives   • Fluticasone Other (See Comments)   • Eliana Other (See Comments)   • Vilanterol Other (See Comments)   • Zyrtec [Cetirizine] Rash       Objective     Physical Exam:  Vital Signs:   Vitals:    04/21/22 1124   BP: 120/62   Pulse: 88   SpO2: 99%   Weight: 68 kg (149 lb 14.4 oz)   Height: 170.2 cm (67\")     Body mass index is 23.48 kg/m².     Physical Exam  Vitals and nursing note reviewed.   Constitutional:       General: She is not in acute distress.     Appearance: Normal appearance. She is not ill-appearing.   HENT:      Head: Normocephalic and atraumatic.      Right Ear: Tympanic membrane and ear canal normal.      Left Ear: Tympanic membrane and ear canal normal.      Nose: Nose normal.   Cardiovascular:      Rate and Rhythm: Normal rate and regular rhythm.      Heart sounds: Normal heart sounds.   Pulmonary:      Effort: Pulmonary effort is normal.      Breath sounds: Normal breath sounds.   Neurological:      Mental Status: She is alert and oriented to person, place, and time. Mental status is at baseline.   Psychiatric:         Mood and Affect: Mood normal.         Procedures    BMI is within normal parameters. No follow-up required.       Assessment / Plan      Assessment/Plan:   Diagnoses and all orders for this visit:    1. Acute low back pain without sciatica, unspecified back pain laterality " (Primary)    2. Type 2 diabetes mellitus without complication, without long-term current use of insulin (HCC)  -     CBC Auto Differential; Future  -     Comprehensive Metabolic Panel; Future  -     Lipid Panel; Future  -     Hemoglobin A1c; Future  -     CBC Auto Differential  -     Comprehensive Metabolic Panel  -     Lipid Panel  -     Hemoglobin A1c    3. Acquired hypothyroidism  -     TSH; Future  -     TSH    4. Gastroesophageal reflux disease without esophagitis    5. Anxiety    6. RLS (restless legs syndrome)  -     Vitamin B12; Future  -     Iron and TIBC; Future  -     Vitamin B12  -     Iron and TIBC    7. Vitamin D deficiency  -     Vitamin D 25 Hydroxy; Future  -     Vitamin D 25 Hydroxy    Other orders  -     rOPINIRole (Requip) 0.5 MG tablet; Take 1 tablet by mouth Every Night. Take 1 hour before bedtime.  Dispense: 30 tablet; Refill: 0  -     celecoxib (CeleBREX) 200 MG capsule; Take 1 capsule by mouth Daily.  Dispense: 90 capsule; Refill: 1  -     cyclobenzaprine (FLEXERIL) 10 MG tablet; Take 1 tablet by mouth 2 (Two) Times a Day As Needed for Muscle Spasms.  Dispense: 180 tablet; Refill: 1  -     Ozempic, 1 MG/DOSE, 4 MG/3ML solution pen-injector; Inject 1 mg under the skin into the appropriate area as directed 1 (One) Time Per Week.  Dispense: 3 pen; Refill: 1             Follow Up:   Return in about 6 months (around 10/21/2022) for Recheck.    Jason Benson  [facility]

## 2022-04-22 LAB
25(OH)D3+25(OH)D2 SERPL-MCNC: 21.2 NG/ML (ref 30–100)
ALBUMIN SERPL-MCNC: 4.6 G/DL (ref 3.8–4.9)
ALBUMIN/GLOB SERPL: 1.9 {RATIO} (ref 1.2–2.2)
ALP SERPL-CCNC: 75 IU/L (ref 44–121)
ALT SERPL-CCNC: 17 IU/L (ref 0–32)
AST SERPL-CCNC: 21 IU/L (ref 0–40)
BASOPHILS # BLD AUTO: 0 X10E3/UL (ref 0–0.2)
BASOPHILS NFR BLD AUTO: 1 %
BILIRUB SERPL-MCNC: 0.3 MG/DL (ref 0–1.2)
BUN SERPL-MCNC: 16 MG/DL (ref 6–24)
BUN/CREAT SERPL: 29 (ref 9–23)
CALCIUM SERPL-MCNC: 9.7 MG/DL (ref 8.7–10.2)
CHLORIDE SERPL-SCNC: 105 MMOL/L (ref 96–106)
CHOLEST SERPL-MCNC: 180 MG/DL (ref 100–199)
CO2 SERPL-SCNC: 17 MMOL/L (ref 20–29)
CREAT SERPL-MCNC: 0.56 MG/DL (ref 0.57–1)
EGFRCR SERPLBLD CKD-EPI 2021: 109 ML/MIN/1.73
EOSINOPHIL # BLD AUTO: 0.2 X10E3/UL (ref 0–0.4)
EOSINOPHIL NFR BLD AUTO: 3 %
ERYTHROCYTE [DISTWIDTH] IN BLOOD BY AUTOMATED COUNT: 13.5 % (ref 11.7–15.4)
GLOBULIN SER CALC-MCNC: 2.4 G/DL (ref 1.5–4.5)
GLUCOSE SERPL-MCNC: 112 MG/DL (ref 65–99)
HBA1C MFR BLD: 6.3 % (ref 4.8–5.6)
HCT VFR BLD AUTO: 43.6 % (ref 34–46.6)
HDLC SERPL-MCNC: 65 MG/DL
HGB BLD-MCNC: 14.4 G/DL (ref 11.1–15.9)
IMM GRANULOCYTES # BLD AUTO: 0 X10E3/UL (ref 0–0.1)
IMM GRANULOCYTES NFR BLD AUTO: 0 %
IRON SATN MFR SERPL: 36 % (ref 15–55)
IRON SERPL-MCNC: 149 UG/DL (ref 27–159)
LDLC SERPL CALC-MCNC: 99 MG/DL (ref 0–99)
LYMPHOCYTES # BLD AUTO: 1.8 X10E3/UL (ref 0.7–3.1)
LYMPHOCYTES NFR BLD AUTO: 29 %
MCH RBC QN AUTO: 27.8 PG (ref 26.6–33)
MCHC RBC AUTO-ENTMCNC: 33 G/DL (ref 31.5–35.7)
MCV RBC AUTO: 84 FL (ref 79–97)
MONOCYTES # BLD AUTO: 0.4 X10E3/UL (ref 0.1–0.9)
MONOCYTES NFR BLD AUTO: 7 %
NEUTROPHILS # BLD AUTO: 3.7 X10E3/UL (ref 1.4–7)
NEUTROPHILS NFR BLD AUTO: 60 %
PLATELET # BLD AUTO: 287 X10E3/UL (ref 150–450)
POTASSIUM SERPL-SCNC: 4.8 MMOL/L (ref 3.5–5.2)
PROT SERPL-MCNC: 7 G/DL (ref 6–8.5)
RBC # BLD AUTO: 5.18 X10E6/UL (ref 3.77–5.28)
SODIUM SERPL-SCNC: 142 MMOL/L (ref 134–144)
TIBC SERPL-MCNC: 412 UG/DL (ref 250–450)
TRIGL SERPL-MCNC: 85 MG/DL (ref 0–149)
TSH SERPL DL<=0.005 MIU/L-ACNC: 0.98 UIU/ML (ref 0.45–4.5)
UIBC SERPL-MCNC: 263 UG/DL (ref 131–425)
VIT B12 SERPL-MCNC: 1216 PG/ML (ref 232–1245)
VLDLC SERPL CALC-MCNC: 16 MG/DL (ref 5–40)
WBC # BLD AUTO: 6.2 X10E3/UL (ref 3.4–10.8)

## 2022-05-02 DIAGNOSIS — R23.2 HOT FLASHES DUE TO TAMOXIFEN: ICD-10-CM

## 2022-05-02 DIAGNOSIS — T45.1X5A HOT FLASHES DUE TO TAMOXIFEN: ICD-10-CM

## 2022-05-02 RX ORDER — CLONIDINE HYDROCHLORIDE 0.1 MG/1
TABLET ORAL
Qty: 180 TABLET | Refills: 3 | Status: SHIPPED | OUTPATIENT
Start: 2022-05-02

## 2022-05-16 ENCOUNTER — PATIENT MESSAGE (OUTPATIENT)
Dept: FAMILY MEDICINE CLINIC | Facility: CLINIC | Age: 52
End: 2022-05-16

## 2022-05-18 RX ORDER — ROPINIROLE 1 MG/1
1 TABLET, FILM COATED ORAL NIGHTLY
Qty: 30 TABLET | Refills: 2 | Status: SHIPPED | OUTPATIENT
Start: 2022-05-18 | End: 2022-08-03

## 2022-05-19 NOTE — TELEPHONE ENCOUNTER
From: Porsha Bolden  To: Jason Benson MD  Sent: 5/16/2022 12:22 PM EDT  Subject: Test result and new med    Hi Yimi,  I have a question and a request.     Question: Should I be concerned about and doing anything about the following specific results:   Creatine (L)  BUN/ Creatine ratio (H)  Total CO2 (L)   Vitamin D (L)    Request: review of comments on Ropinirole.   We have seen some reduction of the body movement issue/ pain with the new med I am on: Ropinirole. I am seeing some reduction in the day time but none in the evening/ night.   Did you want to modify anything here? (Increase or change to different med?)  Or did you want to just call in the prescription to [facility] for the continuance of it; you originally sent it for 30 days for trial. I have about seven day’s worth left and no refills.     Thanks ahead for the response.   Porsha Bolden

## 2022-06-17 DIAGNOSIS — Z17.0 MALIGNANT NEOPLASM OF UPPER-OUTER QUADRANT OF LEFT BREAST IN FEMALE, ESTROGEN RECEPTOR POSITIVE: ICD-10-CM

## 2022-06-17 DIAGNOSIS — C50.412 MALIGNANT NEOPLASM OF UPPER-OUTER QUADRANT OF LEFT BREAST IN FEMALE, ESTROGEN RECEPTOR POSITIVE: ICD-10-CM

## 2022-06-17 RX ORDER — EMPAGLIFLOZIN 25 MG/1
TABLET, FILM COATED ORAL
Qty: 90 TABLET | Refills: 1 | Status: SHIPPED | OUTPATIENT
Start: 2022-06-17 | End: 2022-12-10

## 2022-06-17 RX ORDER — TAMOXIFEN CITRATE 10 MG/1
TABLET ORAL
Qty: 180 TABLET | Refills: 3 | Status: SHIPPED | OUTPATIENT
Start: 2022-06-17

## 2022-07-23 ENCOUNTER — PATIENT MESSAGE (OUTPATIENT)
Dept: FAMILY MEDICINE CLINIC | Facility: CLINIC | Age: 52
End: 2022-07-23

## 2022-07-25 ENCOUNTER — TELEMEDICINE (OUTPATIENT)
Dept: FAMILY MEDICINE CLINIC | Facility: TELEHEALTH | Age: 52
End: 2022-07-25

## 2022-07-25 DIAGNOSIS — U07.1 COVID-19: Primary | ICD-10-CM

## 2022-07-25 PROCEDURE — 99213 OFFICE O/P EST LOW 20 MIN: CPT | Performed by: NURSE PRACTITIONER

## 2022-07-25 NOTE — PATIENT INSTRUCTIONS
10 Things You Can Do to Manage Your COVID-19 Symptoms at Home  If you have possible or confirmed COVID-19:  Stay home except to get medical care.  Monitor your symptoms carefully. If your symptoms get worse, call your healthcare provider immediately.  Get rest and stay hydrated.  If you have a medical appointment, call the healthcare provider ahead of time and tell them that you have or may have COVID-19.  For medical emergencies, call 911 and notify the dispatch personnel that you have or may have COVID-19.  Cover your cough and sneezes with a tissue or use the inside of your elbow.  Wash your hands often with soap and water for at least 20 seconds or clean your hands with an alcohol-based hand  that contains at least 60% alcohol.  As much as possible, stay in a specific room and away from other people in your home. Also, you should use a separate bathroom, if available. If you need to be around other people in or outside of the home, wear a mask.  Avoid sharing personal items with other people in your household, like dishes, towels, and bedding.  Clean all surfaces that are touched often, like counters, tabletops, and doorknobs. Use household cleaning sprays or wipes according to the label instructions.  cdc.gov/coronavirus  07/16/2021  This information is not intended to replace advice given to you by your health care provider. Make sure you discuss any questions you have with your health care provider.  Document Revised: 11/01/2021 Document Reviewed: 11/01/2021  [employer] Patient Education © 2021 [employer]  Pharyngitis    Pharyngitis is a sore throat (pharynx). This is when there is redness, pain, and swelling in your throat. Most of the time, this condition gets better on its own. In some cases, you may need medicine.  Follow these instructions at home:  Take over-the-counter and prescription medicines only as told by your doctor.  If you were prescribed an antibiotic medicine, take it as told by  your doctor. Do not stop taking the antibiotic even if you start to feel better.  Do not give children aspirin. Aspirin has been linked to Reye syndrome.  Drink enough water and fluids to keep your pee (urine) clear or pale yellow.  Get a lot of rest.  Rinse your mouth (gargle) with a salt-water mixture 3-4 times a day or as needed. To make a salt-water mixture, completely dissolve ½-1 tsp of salt in 1 cup of warm water.  If your doctor approves, you may use throat lozenges or sprays to soothe your throat.  Contact a doctor if:  You have large, tender lumps in your neck.  You have a rash.  You cough up green, yellow-brown, or bloody spit.  Get help right away if:  You have a stiff neck.  You drool or cannot swallow liquids.  You cannot drink or take medicines without throwing up.  You have very bad pain that does not go away with medicine.  You have problems breathing, and it is not from a stuffy nose.  You have new pain and swelling in your knees, ankles, wrists, or elbows.  Summary  Pharyngitis is a sore throat (pharynx). This is when there is redness, pain, and swelling in your throat.  If you were prescribed an antibiotic medicine, take it as told by your doctor. Do not stop taking the antibiotic even if you start to feel better.  Most of the time, pharyngitis gets better on its own. Sometimes, you may need medicine.  This information is not intended to replace advice given to you by your health care provider. Make sure you discuss any questions you have with your health care provider.  Document Revised: 11/30/2018 Document Reviewed: 01/23/2018  [employer] Patient Education © 2021 [employer]  General Headache Without Cause  A headache is pain or discomfort that is felt around the head or neck area. There are many causes and types of headaches. In some cases, the cause may not be found.  Follow these instructions at home:  Watch your condition for any changes. Let your doctor know about them. Take these steps to  help with your condition:  Managing pain         Take over-the-counter and prescription medicines only as told by your doctor.  Lie down in a dark, quiet room when you have a headache.  If told, put ice on your head and neck area:  Put ice in a plastic bag.  Place a towel between your skin and the bag.  Leave the ice on for 20 minutes, 2-3 times per day.  If told, put heat on the affected area. Use the heat source that your doctor recommends, such as a moist heat pack or a heating pad.  Place a towel between your skin and the heat source.  Leave the heat on for 20-30 minutes.  Remove the heat if your skin turns bright red. This is very important if you are unable to feel pain, heat, or cold. You may have a greater risk of getting burned.  Keep lights dim if bright lights bother you or make your headaches worse.  Eating and drinking  Eat meals on a regular schedule.  If you drink alcohol:  Limit how much you use to:  0-1 drink a day for women.  0-2 drinks a day for men.  Be aware of how much alcohol is in your drink. In the [place] one drink equals one 12 oz bottle of beer (355 mL), one 5 oz glass of wine (148 mL), or one 1½ oz glass of hard liquor (44 mL).  Stop drinking caffeine, or reduce how much caffeine you drink.  General instructions    Keep a journal to find out if certain things bring on headaches. For example, write down:  What you eat and drink.  How much sleep you get.  Any change to your diet or medicines.  Get a massage or try other ways to relax.  Limit stress.  Sit up straight. Do not tighten (tense) your muscles.  Do not use any products that contain nicotine or tobacco. This includes cigarettes, e-cigarettes, and chewing tobacco. If you need help quitting, ask your doctor.  Exercise regularly as told by your doctor.  Get enough sleep. This often means 7-9 hours of sleep each night.  Keep all follow-up visits as told by your doctor. This is important.    Contact a doctor if:  Your symptoms are not  helped by medicine.  You have a headache that feels different than the other headaches.  You feel sick to your stomach (nauseous) or you throw up (vomit).  You have a fever.  Get help right away if:  Your headache gets very bad quickly.  Your headache gets worse after a lot of physical activity.  You keep throwing up.  You have a stiff neck.  You have trouble seeing.  You have trouble speaking.  You have pain in the eye or ear.  Your muscles are weak or you lose muscle control.  You lose your balance or have trouble walking.  You feel like you will pass out (faint) or you pass out.  You are mixed up (confused).  You have a seizure.  Summary  A headache is pain or discomfort that is felt around the head or neck area.  There are many causes and types of headaches. In some cases, the cause may not be found.  Keep a journal to help find out what causes your headaches. Watch your condition for any changes. Let your doctor know about them.  Contact a doctor if you have a headache that is different from usual, or if your headache is not helped by medicine.  Get help right away if your headache gets very bad, you throw up, you have trouble seeing, you lose your balance, or you have a seizure.  This information is not intended to replace advice given to you by your health care provider. Make sure you discuss any questions you have with your health care provider.  Document Revised: 07/08/2019 Document Reviewed: 07/08/2019  [employer] Patient Education © 2021 [employer].  Cough, Adult  A cough helps to clear your throat and lungs. A cough may be a sign of an illness or another medical condition.  An acute cough may only last 2-3 weeks, while a chronic cough may last 8 or more weeks.  Many things can cause a cough. They include:  Germs (viruses or bacteria) that attack the airway.  Breathing in things that bother (irritate) your lungs.  Allergies.  Asthma.  Mucus that runs down the back of your throat (postnasal  drip).  Smoking.  Acid backing up from the stomach into the tube that moves food from the mouth to the stomach (gastroesophageal reflux).  Some medicines.  Lung problems.  Other medical conditions, such as heart failure or a blood clot in the lung (pulmonary embolism).  Follow these instructions at home:  Medicines  Take over-the-counter and prescription medicines only as told by your doctor.  Talk with your doctor before you take medicines that stop a cough (cough suppressants).  Lifestyle    Do not smoke, and try not to be around smoke. Do not use any products that contain nicotine or tobacco, such as cigarettes, e-cigarettes, and chewing tobacco. If you need help quitting, ask your doctor.  Drink enough fluid to keep your pee (urine) pale yellow.  Avoid caffeine.  Do not drink alcohol if your doctor tells you not to drink.    General instructions    Watch for any changes in your cough. Tell your doctor about them.  Always cover your mouth when you cough.  Stay away from things that make you cough, such as perfume, candles, campfire smoke, or cleaning products.  If the air is dry, use a cool mist vaporizer or humidifier in your home.  If your cough is worse at night, try using extra pillows to raise your head up higher while you sleep.  Rest as needed.  Keep all follow-up visits as told by your doctor. This is important.    Contact a doctor if:  You have new symptoms.  You cough up pus.  Your cough does not get better after 2-3 weeks, or your cough gets worse.  Cough medicine does not help your cough and you are not sleeping well.  You have pain that gets worse or pain that is not helped with medicine.  You have a fever.  You are losing weight and you do not know why.  You have night sweats.  Get help right away if:  You cough up blood.  You have trouble breathing.  Your heartbeat is very fast.  These symptoms may be an emergency. Do not wait to see if the symptoms will go away. Get medical help right away. Call  your local emergency services (911 in the [place] Do not drive yourself to the hospital.  Summary  A cough helps to clear your throat and lungs. Many things can cause a cough.  Take over-the-counter and prescription medicines only as told by your doctor.  Always cover your mouth when you cough.  Contact a doctor if you have new symptoms or you have a cough that does not get better or gets worse.  This information is not intended to replace advice given to you by your health care provider. Make sure you discuss any questions you have with your health care provider.  Document Revised: 02/05/2021 Document Reviewed: 01/06/2020  [employer] Patient Education © 2021 [employer]

## 2022-07-25 NOTE — PROGRESS NOTES
You have chosen to receive care through a telehealth visit.  Do you consent to use a video/audio connection for your medical care today? Yes     CHIEF COMPLAINT  Chief Complaint   Patient presents with   • Covid-19 Home Monitoring Video Visit     Cough, sore throat         HPI  Porsha Bolden is a 52 y.o. female  presents with complaint of headache, cough and sore throat since Wednesday. She has tested positive for Covid 19. She reports her symptoms have improved today and she reports no fever. She is inquiring about the antiviral medications that are available. .     Review of Systems   Constitutional: Positive for activity change and fatigue. Negative for fever.   HENT: Positive for congestion and sore throat.    Respiratory: Positive for cough. Negative for shortness of breath, wheezing and stridor.    Cardiovascular: Negative.    Gastrointestinal: Negative.    Musculoskeletal: Negative.    Neurological: Positive for headaches.   Hematological: Negative.    Psychiatric/Behavioral: Negative.        Past Medical History:   Diagnosis Date   • Breast cancer (HCC) 2018   • Diabetes mellitus (HCC)    • Disease of thyroid gland        Family History   Problem Relation Age of Onset   • Uterine cancer Mother    • Lung cancer Father    • Brain cancer Father 69   • Breast cancer Sister    • Brain cancer Paternal Grandmother        Social History     Socioeconomic History   • Marital status:    Tobacco Use   • Smoking status: Never Smoker   • Smokeless tobacco: Never Used   Substance and Sexual Activity   • Alcohol use: Yes     Comment: twice per year       Porsha Bolden  reports that she has never smoked. She has never used smokeless tobacco.    There were no vitals taken for this visit.    PHYSICAL EXAM  Physical Exam   Constitutional: She is oriented to person, place, and time. She appears well-developed and well-nourished. She does not have a sickly appearance. She does not appear ill. No  distress.   HENT:   Head: Normocephalic and atraumatic.   Pulmonary/Chest: Effort normal.  No respiratory distress.  Neurological: She is alert and oriented to person, place, and time.   Psychiatric: She has a normal mood and affect.   Vitals reviewed.      Results for orders placed or performed in visit on 04/21/22   CBC Auto Differential    Specimen: Blood   Result Value Ref Range    WBC 6.2 3.4 - 10.8 x10E3/uL    RBC 5.18 3.77 - 5.28 x10E6/uL    Hemoglobin 14.4 11.1 - 15.9 g/dL    Hematocrit 43.6 34.0 - 46.6 %    MCV 84 79 - 97 fL    MCH 27.8 26.6 - 33.0 pg    MCHC 33.0 31.5 - 35.7 g/dL    RDW 13.5 11.7 - 15.4 %    Platelets 287 150 - 450 x10E3/uL    Neutrophil Rel % 60 Not Estab. %    Lymphocyte Rel % 29 Not Estab. %    Monocyte Rel % 7 Not Estab. %    Eosinophil Rel % 3 Not Estab. %    Basophil Rel % 1 Not Estab. %    Neutrophils Absolute 3.7 1.4 - 7.0 x10E3/uL    Lymphocytes Absolute 1.8 0.7 - 3.1 x10E3/uL    Monocytes Absolute 0.4 0.1 - 0.9 x10E3/uL    Eosinophils Absolute 0.2 0.0 - 0.4 x10E3/uL    Basophils Absolute 0.0 0.0 - 0.2 x10E3/uL    Immature Granulocyte Rel % 0 Not Estab. %    Immature Grans Absolute 0.0 0.0 - 0.1 x10E3/uL   Comprehensive Metabolic Panel    Specimen: Blood   Result Value Ref Range    Glucose 112 (H) 65 - 99 mg/dL    BUN 16 6 - 24 mg/dL    Creatinine 0.56 (L) 0.57 - 1.00 mg/dL    EGFR Result 109 >59 mL/min/1.73    BUN/Creatinine Ratio 29 (H) 9 - 23    Sodium 142 134 - 144 mmol/L    Potassium 4.8 3.5 - 5.2 mmol/L    Chloride 105 96 - 106 mmol/L    Total CO2 17 (L) 20 - 29 mmol/L    Calcium 9.7 8.7 - 10.2 mg/dL    Total Protein 7.0 6.0 - 8.5 g/dL    Albumin 4.6 3.8 - 4.9 g/dL    Globulin 2.4 1.5 - 4.5 g/dL    A/G Ratio 1.9 1.2 - 2.2    Total Bilirubin 0.3 0.0 - 1.2 mg/dL    Alkaline Phosphatase 75 44 - 121 IU/L    AST (SGOT) 21 0 - 40 IU/L    ALT (SGPT) 17 0 - 32 IU/L   Lipid Panel    Specimen: Blood   Result Value Ref Range    Total Cholesterol 180 100 - 199 mg/dL    Triglycerides 85  0 - 149 mg/dL    HDL Cholesterol 65 >39 mg/dL    VLDL Cholesterol [place] 16 5 - 40 mg/dL    LDL Chol Calc (NIH) 99 0 - 99 mg/dL   TSH    Specimen: Blood   Result Value Ref Range    TSH 0.985 0.450 - 4.500 uIU/mL   Hemoglobin A1c    Specimen: Blood   Result Value Ref Range    Hemoglobin A1C 6.3 (H) 4.8 - 5.6 %   Vitamin B12    Specimen: Blood   Result Value Ref Range    Vitamin B-12 1216 232 - 1245 pg/mL   Iron and TIBC    Specimen: Blood   Result Value Ref Range    TIBC 412 250 - 450 ug/dL    UIBC 263 131 - 425 ug/dL    Iron 149 27 - 159 ug/dL    Iron Saturation 36 15 - 55 %   Vitamin D 25 Hydroxy    Specimen: Blood   Result Value Ref Range    25 Hydroxy, Vitamin D 21.2 (L) 30.0 - 100.0 ng/mL       Diagnoses and all orders for this visit:    1. COVID-19 (Primary)    increase water intake and rest   Vitamin D,C and zinc.   After discussion option was declined for antiviral therapy.   Advised warm salt water gargles and warm tea for sore throat.   Tylenol as directed prn pain or fever.     Continue to quarantine as directed.     You have tested positive for Covid-19. Please quarantine for 10 days since symptoms first appeared. If symptoms fully resolve, isolation may be shortened and end after day 5 on the first day without symptoms. Wear a well-fitting face mask for 10 full days since the start of symptoms. Isolation should not be shortened if a mask cannot be worn properly and consistently. If you are a healthcare worker, you should not shorten your quarantine period unless advised so by employee health      FOLLOW-Up  As discussed during visit with PCP/[facility] if no improvement or Urgent Care/Emergency Department if worsening of symptoms    Patient verbalizes understanding of medication dosage, comfort measures, instructions for treatment and follow-up.    Catherine Payan, NERI  07/25/2022  13:31 EDT    The use of a video visit has been reviewed with the patient and verbal informed consent has been obtained.  Myself and Porsha Bolden participated in this visit. The patient is located in [address] [place] [address].    I am located in [place], [place]. Mychart and Zoom were utilized. I spent  25 minutes in the patient's chart for this visit.

## 2022-07-28 DIAGNOSIS — M54.50 ACUTE LOW BACK PAIN WITHOUT SCIATICA, UNSPECIFIED BACK PAIN LATERALITY: Primary | ICD-10-CM

## 2022-07-29 DIAGNOSIS — M54.50 ACUTE LOW BACK PAIN WITHOUT SCIATICA, UNSPECIFIED BACK PAIN LATERALITY: Primary | ICD-10-CM

## 2022-07-31 RX ORDER — GABAPENTIN 600 MG/1
TABLET ORAL
Qty: 270 TABLET | Refills: 1 | Status: SHIPPED | OUTPATIENT
Start: 2022-07-31 | End: 2023-01-07 | Stop reason: SDUPTHER

## 2022-08-01 NOTE — TELEPHONE ENCOUNTER
Please see [employer] message. I don't think this message made it to you 9 days ago.. im not sure what happened

## 2022-08-03 RX ORDER — PRAMIPEXOLE DIHYDROCHLORIDE 0.25 MG/1
0.25 TABLET ORAL
Qty: 30 TABLET | Refills: 2 | Status: SHIPPED | OUTPATIENT
Start: 2022-08-03 | End: 2022-10-05

## 2022-09-28 ENCOUNTER — LAB (OUTPATIENT)
Dept: FAMILY MEDICINE CLINIC | Facility: CLINIC | Age: 52
End: 2022-09-28

## 2022-09-28 DIAGNOSIS — E11.9 TYPE 2 DIABETES MELLITUS WITHOUT COMPLICATION, WITHOUT LONG-TERM CURRENT USE OF INSULIN: Primary | ICD-10-CM

## 2022-09-28 DIAGNOSIS — E55.9 VITAMIN D DEFICIENCY: ICD-10-CM

## 2022-09-28 DIAGNOSIS — E03.9 ACQUIRED HYPOTHYROIDISM: ICD-10-CM

## 2022-09-28 DIAGNOSIS — E11.9 TYPE 2 DIABETES MELLITUS WITHOUT COMPLICATION, WITHOUT LONG-TERM CURRENT USE OF INSULIN: ICD-10-CM

## 2022-09-28 PROCEDURE — 36415 COLL VENOUS BLD VENIPUNCTURE: CPT | Performed by: FAMILY MEDICINE

## 2022-09-29 LAB
25(OH)D3+25(OH)D2 SERPL-MCNC: 25.7 NG/ML (ref 30–100)
ALBUMIN SERPL-MCNC: 4.2 G/DL (ref 3.8–4.9)
ALBUMIN/GLOB SERPL: 1.8 {RATIO} (ref 1.2–2.2)
ALP SERPL-CCNC: 82 IU/L (ref 44–121)
ALT SERPL-CCNC: 20 IU/L (ref 0–32)
AST SERPL-CCNC: 19 IU/L (ref 0–40)
BASOPHILS # BLD AUTO: 0.1 X10E3/UL (ref 0–0.2)
BASOPHILS NFR BLD AUTO: 1 %
BILIRUB SERPL-MCNC: <0.2 MG/DL (ref 0–1.2)
BUN SERPL-MCNC: 15 MG/DL (ref 6–24)
BUN/CREAT SERPL: 22 (ref 9–23)
CALCIUM SERPL-MCNC: 9.3 MG/DL (ref 8.7–10.2)
CHLORIDE SERPL-SCNC: 103 MMOL/L (ref 96–106)
CHOLEST SERPL-MCNC: 143 MG/DL (ref 100–199)
CO2 SERPL-SCNC: 21 MMOL/L (ref 20–29)
CREAT SERPL-MCNC: 0.69 MG/DL (ref 0.57–1)
EGFRCR SERPLBLD CKD-EPI 2021: 104 ML/MIN/1.73
EOSINOPHIL # BLD AUTO: 0.3 X10E3/UL (ref 0–0.4)
EOSINOPHIL NFR BLD AUTO: 4 %
ERYTHROCYTE [DISTWIDTH] IN BLOOD BY AUTOMATED COUNT: 13.8 % (ref 11.7–15.4)
FERRITIN SERPL-MCNC: 54 NG/ML (ref 15–150)
GLOBULIN SER CALC-MCNC: 2.3 G/DL (ref 1.5–4.5)
GLUCOSE SERPL-MCNC: 119 MG/DL (ref 70–99)
HBA1C MFR BLD: 6.7 % (ref 4.8–5.6)
HCT VFR BLD AUTO: 39 % (ref 34–46.6)
HDLC SERPL-MCNC: 50 MG/DL
HGB BLD-MCNC: 13.3 G/DL (ref 11.1–15.9)
IMM GRANULOCYTES # BLD AUTO: 0 X10E3/UL (ref 0–0.1)
IMM GRANULOCYTES NFR BLD AUTO: 0 %
IRON SATN MFR SERPL: 15 % (ref 15–55)
IRON SERPL-MCNC: 54 UG/DL (ref 27–159)
LDLC SERPL CALC-MCNC: 58 MG/DL (ref 0–99)
LYMPHOCYTES # BLD AUTO: 2.3 X10E3/UL (ref 0.7–3.1)
LYMPHOCYTES NFR BLD AUTO: 34 %
MCH RBC QN AUTO: 28.4 PG (ref 26.6–33)
MCHC RBC AUTO-ENTMCNC: 34.1 G/DL (ref 31.5–35.7)
MCV RBC AUTO: 83 FL (ref 79–97)
MONOCYTES # BLD AUTO: 0.4 X10E3/UL (ref 0.1–0.9)
MONOCYTES NFR BLD AUTO: 7 %
NEUTROPHILS # BLD AUTO: 3.5 X10E3/UL (ref 1.4–7)
NEUTROPHILS NFR BLD AUTO: 54 %
PLATELET # BLD AUTO: 290 X10E3/UL (ref 150–450)
POTASSIUM SERPL-SCNC: 4.4 MMOL/L (ref 3.5–5.2)
PROT SERPL-MCNC: 6.5 G/DL (ref 6–8.5)
RBC # BLD AUTO: 4.68 X10E6/UL (ref 3.77–5.28)
SODIUM SERPL-SCNC: 140 MMOL/L (ref 134–144)
TIBC SERPL-MCNC: 365 UG/DL (ref 250–450)
TRIGL SERPL-MCNC: 221 MG/DL (ref 0–149)
TSH SERPL DL<=0.005 MIU/L-ACNC: 2.26 UIU/ML (ref 0.45–4.5)
UIBC SERPL-MCNC: 311 UG/DL (ref 131–425)
VLDLC SERPL CALC-MCNC: 35 MG/DL (ref 5–40)
WBC # BLD AUTO: 6.6 X10E3/UL (ref 3.4–10.8)

## 2022-10-05 ENCOUNTER — OFFICE VISIT (OUTPATIENT)
Dept: FAMILY MEDICINE CLINIC | Facility: CLINIC | Age: 52
End: 2022-10-05

## 2022-10-05 VITALS
OXYGEN SATURATION: 97 % | WEIGHT: 159 LBS | HEIGHT: 67 IN | DIASTOLIC BLOOD PRESSURE: 76 MMHG | HEART RATE: 79 BPM | SYSTOLIC BLOOD PRESSURE: 126 MMHG | BODY MASS INDEX: 24.96 KG/M2

## 2022-10-05 DIAGNOSIS — E11.9 TYPE 2 DIABETES MELLITUS WITHOUT COMPLICATION, WITHOUT LONG-TERM CURRENT USE OF INSULIN: ICD-10-CM

## 2022-10-05 DIAGNOSIS — E55.9 VITAMIN D DEFICIENCY: ICD-10-CM

## 2022-10-05 DIAGNOSIS — M54.50 ACUTE LOW BACK PAIN WITHOUT SCIATICA, UNSPECIFIED BACK PAIN LATERALITY: Primary | ICD-10-CM

## 2022-10-05 DIAGNOSIS — G25.81 RLS (RESTLESS LEGS SYNDROME): ICD-10-CM

## 2022-10-05 DIAGNOSIS — E03.9 ACQUIRED HYPOTHYROIDISM: ICD-10-CM

## 2022-10-05 PROCEDURE — 99214 OFFICE O/P EST MOD 30 MIN: CPT | Performed by: FAMILY MEDICINE

## 2022-10-05 PROCEDURE — 90472 IMMUNIZATION ADMIN EACH ADD: CPT | Performed by: FAMILY MEDICINE

## 2022-10-05 PROCEDURE — 90471 IMMUNIZATION ADMIN: CPT | Performed by: FAMILY MEDICINE

## 2022-10-05 PROCEDURE — 90677 PCV20 VACCINE IM: CPT | Performed by: FAMILY MEDICINE

## 2022-10-05 PROCEDURE — 90686 IIV4 VACC NO PRSV 0.5 ML IM: CPT | Performed by: FAMILY MEDICINE

## 2022-10-05 RX ORDER — PRIMIDONE 50 MG/1
50 TABLET ORAL NIGHTLY
Qty: 30 TABLET | Refills: 5 | Status: SHIPPED | OUTPATIENT
Start: 2022-10-05 | End: 2023-02-02 | Stop reason: SDUPTHER

## 2022-10-06 RX ORDER — TRAMADOL HYDROCHLORIDE 50 MG/1
50 TABLET ORAL 3 TIMES DAILY
Qty: 270 TABLET | Refills: 0 | Status: SHIPPED | OUTPATIENT
Start: 2022-10-06 | End: 2023-02-02 | Stop reason: SDUPTHER

## 2022-10-06 NOTE — PROGRESS NOTES
Follow Up Office Visit      Date of Visit:  10/05/2022   Patient Name: Porsha Bolden  : 1970   MRN: [id number]     Chief Complaint:    Chief Complaint   Patient presents with   • Annual Exam       History of Present Illness: Porsha Bolden is a 52 y.o. female who is here today for follow up.  Reviewed recent labs.  Relatively stable.  Patient being treated for diabetes and hypothyroidism.  Conditions overall stable.  Patient also has vitamin D deficiency and still low.  Worsening problems with her restless leg syndrome and did not tolerate the Mirapex.  Patient also needing refills on tramadol for low back pain.        Subjective      Review of Systems:   Review of Systems   Constitutional: Negative for fatigue and fever.   HENT: Negative for congestion and ear pain.    Respiratory: Negative for apnea, cough, chest tightness and shortness of breath.    Cardiovascular: Negative for chest pain.   Gastrointestinal: Negative for abdominal pain, constipation, diarrhea and nausea.   Musculoskeletal: Negative for arthralgias.   Psychiatric/Behavioral: Negative for depressed mood and stress.       Past Medical History:   Past Medical History:   Diagnosis Date   • Breast cancer (HCC) 2018   • Diabetes mellitus (HCC)    • Disease of thyroid gland        Past Surgical History:   Past Surgical History:   Procedure Laterality Date   • BREAST LUMPECTOMY Left 2018   • HYSTERECTOMY      fibroid   • KNEE ARTHROSCOPY  2004   • KNEE SURGERY      x4   • REFRACTIVE SURGERY      [place]       Family History:   Family History   Problem Relation Age of Onset   • Uterine cancer Mother    • Lung cancer Father    • Brain cancer Father 69   • Breast cancer Sister    • Brain cancer Paternal Grandmother        Social History:   Social History     Socioeconomic History   • Marital status:    Tobacco Use   • Smoking status: Never Smoker   • Smokeless tobacco: Never Used   Substance and Sexual Activity   •  Alcohol use: Yes     Comment: twice per year       Medications:     Current Outpatient Medications:   •  traMADol (ULTRAM) 50 MG tablet, Take 1 tablet by mouth 3 (Three) Times a Day., Disp: 270 tablet, Rfl: 0  •  celecoxib (CeleBREX) 200 MG capsule, Take 1 capsule by mouth Daily., Disp: 90 capsule, Rfl: 1  •  cloNIDine (CATAPRES) 0.1 MG tablet, TAKE 1 TABLET BY MOUTH TWICE DAILY FOR HOT FLASHES, Disp: 180 tablet, Rfl: 3  •  cyclobenzaprine (FLEXERIL) 10 MG tablet, Take 1 tablet by mouth 2 (Two) Times a Day As Needed for Muscle Spasms., Disp: 180 tablet, Rfl: 1  •  escitalopram (LEXAPRO) 10 MG tablet, Take 1 tablet by mouth once daily, Disp: 90 tablet, Rfl: 3  •  gabapentin (NEURONTIN) 600 MG tablet, TAKE 1 TABLET 3 TIMES A DAY, Disp: 270 tablet, Rfl: 1  •  Jardiance 25 MG tablet tablet, TAKE 1 TABLET EVERY MORNING, Disp: 90 tablet, Rfl: 1  •  levocetirizine (XYZAL) 5 MG tablet, Take 5 mg by mouth Every Evening., Disp: , Rfl:   •  levothyroxine (SYNTHROID, LEVOTHROID) 25 MCG tablet, , Disp: , Rfl:   •  Ozempic, 1 MG/DOSE, 4 MG/3ML solution pen-injector, Inject 1 mg under the skin into the appropriate area as directed 1 (One) Time Per Week., Disp: 3 pen, Rfl: 1  •  pantoprazole (PROTONIX) 40 MG EC tablet, TAKE 1 TABLET DAILY, Disp: 90 tablet, Rfl: 1  •  primidone (MYSOLINE) 50 MG tablet, Take 1 tablet by mouth Every Night., Disp: 30 tablet, Rfl: 5  •  Synthroid 50 MCG tablet, , Disp: , Rfl:   •  tamoxifen (NOLVADEX) 10 MG tablet, TAKE 1 TABLET TWICE A DAY, Disp: 180 tablet, Rfl: 3    Allergies:   Allergies   Allergen Reactions   • Codeine GI Intolerance   • Morphine GI Intolerance   • Hydrocodone-Acetaminophen Itching   • Taxotere [Docetaxel] Hives   • Cytoxan [Cyclophosphamide] Hives   • Fluticasone Other (See Comments)   • Ginger Other (See Comments)   • Vilanterol Other (See Comments)   • Zyrtec [Cetirizine] Rash       Objective     Physical Exam:  Vital Signs:   Vitals:    10/05/22 1116   BP: 126/76   Pulse: 79  "  SpO2: 97%   Weight: 72.1 kg (159 lb)   Height: 170.2 cm (67\")     Body mass index is 24.9 kg/m².     Physical Exam  Vitals and nursing note reviewed.   Constitutional:       General: She is not in acute distress.     Appearance: Normal appearance. She is not ill-appearing.   HENT:      Head: Normocephalic and atraumatic.      Right Ear: Tympanic membrane and ear canal normal.      Left Ear: Tympanic membrane and ear canal normal.      Nose: Nose normal.   Cardiovascular:      Rate and Rhythm: Normal rate and regular rhythm.      Heart sounds: Normal heart sounds.   Pulmonary:      Effort: Pulmonary effort is normal.      Breath sounds: Normal breath sounds.   Neurological:      Mental Status: She is alert and oriented to person, place, and time. Mental status is at baseline.   Psychiatric:         Mood and Affect: Mood normal.         Procedures      Assessment / Plan      Assessment/Plan:   Diagnoses and all orders for this visit:    1. Acute low back pain without sciatica, unspecified back pain laterality (Primary)  -     traMADol (ULTRAM) 50 MG tablet; Take 1 tablet by mouth 3 (Three) Times a Day.  Dispense: 270 tablet; Refill: 0    2. RLS (restless legs syndrome)    3. Type 2 diabetes mellitus without complication, without long-term current use of insulin ([facility])    4. Acquired hypothyroidism    5. Vitamin D deficiency    Other orders  -     primidone (MYSOLINE) 50 MG tablet; Take 1 tablet by mouth Every Night.  Dispense: 30 tablet; Refill: 5  -     FluLaval/Fluzone >6 mos ([id number])  -     Pneumococcal Conjugate Vaccine 20-Valent (PCV20)         Will be stopping Mirapex for restless leg syndrome because of side effects.  Started primidone.  We reviewed her recent labs.  Diabetes is overall stable.  Patient will be continuing her current medication.  Her hypothyroidism is also stable.  She is a still has low vitamin D and will continue supplementation.  Patient will also continue her tramadol for her low back " pain.    Follow Up:   No follow-ups on file.    Jason Benson  [facility] [place]

## 2022-10-10 RX ORDER — CYCLOBENZAPRINE HCL 10 MG
TABLET ORAL
Qty: 180 TABLET | Refills: 1 | Status: SHIPPED | OUTPATIENT
Start: 2022-10-10 | End: 2023-02-02

## 2022-10-10 RX ORDER — SEMAGLUTIDE 1.34 MG/ML
INJECTION, SOLUTION SUBCUTANEOUS
Qty: 9 ML | Refills: 1 | Status: SHIPPED | OUTPATIENT
Start: 2022-10-10 | End: 2023-03-17

## 2022-10-25 ENCOUNTER — TELEPHONE (OUTPATIENT)
Dept: FAMILY MEDICINE CLINIC | Facility: CLINIC | Age: 52
End: 2022-10-25

## 2022-10-25 ENCOUNTER — OFFICE VISIT (OUTPATIENT)
Dept: FAMILY MEDICINE CLINIC | Facility: CLINIC | Age: 52
End: 2022-10-25

## 2022-10-25 VITALS
HEART RATE: 81 BPM | DIASTOLIC BLOOD PRESSURE: 80 MMHG | SYSTOLIC BLOOD PRESSURE: 122 MMHG | OXYGEN SATURATION: 98 % | WEIGHT: 159 LBS | HEIGHT: 67 IN | BODY MASS INDEX: 24.96 KG/M2

## 2022-10-25 DIAGNOSIS — R30.0 DYSURIA: ICD-10-CM

## 2022-10-25 DIAGNOSIS — M54.9 OTHER ACUTE BACK PAIN: ICD-10-CM

## 2022-10-25 DIAGNOSIS — N30.01 ACUTE CYSTITIS WITH HEMATURIA: Primary | ICD-10-CM

## 2022-10-25 DIAGNOSIS — R31.0 GROSS HEMATURIA: ICD-10-CM

## 2022-10-25 PROBLEM — L50.1 CHRONIC IDIOPATHIC URTICARIA: Status: ACTIVE | Noted: 2021-10-20

## 2022-10-25 LAB
BILIRUB BLD-MCNC: ABNORMAL MG/DL
CLARITY, POC: CLEAR
COLOR UR: ABNORMAL
EXPIRATION DATE: ABNORMAL
GLUCOSE UR STRIP-MCNC: ABNORMAL MG/DL
KETONES UR QL: ABNORMAL
LEUKOCYTE EST, POC: ABNORMAL
Lab: ABNORMAL
NITRITE UR-MCNC: POSITIVE MG/ML
PH UR: 6 [PH] (ref 5–8)
PROT UR STRIP-MCNC: ABNORMAL MG/DL
RBC # UR STRIP: ABNORMAL /UL
SP GR UR: 1.02 (ref 1–1.03)
UROBILINOGEN UR QL: ABNORMAL

## 2022-10-25 PROCEDURE — 99214 OFFICE O/P EST MOD 30 MIN: CPT | Performed by: PHYSICIAN ASSISTANT

## 2022-10-25 PROCEDURE — 81003 URINALYSIS AUTO W/O SCOPE: CPT | Performed by: PHYSICIAN ASSISTANT

## 2022-10-25 RX ORDER — MOMETASONE FUROATE 1 MG/G
1 CREAM TOPICAL
COMMUNITY
Start: 2022-10-20

## 2022-10-25 RX ORDER — FAMOTIDINE 20 MG/1
20 TABLET, FILM COATED ORAL
COMMUNITY
Start: 2022-10-20

## 2022-10-25 RX ORDER — CEFDINIR 300 MG/1
300 CAPSULE ORAL 2 TIMES DAILY
Qty: 20 CAPSULE | Refills: 0 | Status: SHIPPED | OUTPATIENT
Start: 2022-10-25 | End: 2022-11-04

## 2022-10-25 NOTE — TELEPHONE ENCOUNTER
Caller: Porsha Bolden    Relationship to patient: Self    Best call back number: [phone]    Patient is needing: LAB RESULTS IMMEDIATELY. PATIENTS  WAS VERY UPSET HE COULD NOT SPEAK TO SOMEONE.

## 2022-10-25 NOTE — PROGRESS NOTES
"Chief Complaint  Urinary Tract Infection    Subjective        Porsha Bolden presents to [facility]  History of Present Illness  Patient states that for the past two days she has had urinary frequency and urgency but no pain with urination. She states that she had blood after voiding starting Sunday and has been going on on and off. She states that she has never had a UTI in the past. She has had lower back pain that radiated around to her front and feels like a tugging sensation. She states that the pain is not too bad and also notes that continues to have pain if lying doen or sitting., She states that she was wandering the house last night with pain. She had a temp starting last night to 101/102. She has a stong family history of stones and everyone in the family thinks that she has a stone. She has no personal history of stones      Objective   Vital Signs:  /80   Pulse 81   Ht 170.2 cm (67\")   Wt 72.1 kg (159 lb)   SpO2 98%   BMI 24.90 kg/m²   Estimated body mass index is 24.9 kg/m² as calculated from the following:    Height as of this encounter: 170.2 cm (67\").    Weight as of this encounter: 72.1 kg (159 lb).    BMI is within normal parameters. No other follow-up for BMI required.      Physical Exam  Vitals reviewed.   Constitutional:       Appearance: Normal appearance.   HENT:      Head: Normocephalic and atraumatic.      Right Ear: Tympanic membrane, ear canal and external ear normal.      Left Ear: Tympanic membrane, ear canal and external ear normal.      Nose: Nose normal. No congestion.      Mouth/Throat:      Mouth: Mucous membranes are moist.   Eyes:      Pupils: Pupils are equal, round, and reactive to light.   Cardiovascular:      Rate and Rhythm: Normal rate and regular rhythm.      Heart sounds: Normal heart sounds.   Pulmonary:      Effort: Pulmonary effort is normal.      Breath sounds: Normal breath sounds.   Abdominal:      Tenderness: There " is no right CVA tenderness or left CVA tenderness.   Neurological:      General: No focal deficit present.      Mental Status: She is alert.   Psychiatric:         Mood and Affect: Mood normal.        Result Review :     Urine Culture - Urine, Urine, Clean Catch (10/25/2022 11:34)  POC Urinalysis Dipstick, Automated (10/25/2022 11:29)              Assessment and Plan   Diagnoses and all orders for this visit:    1. Acute cystitis with hematuria (Primary)  -     cefdinir (OMNICEF) 300 MG capsule; Take 1 capsule by mouth 2 (Two) Times a Day for 10 days.  Dispense: 20 capsule; Refill: 0  Will add omnicef today for infection as well as sending urine for culture. Will also arrange for CT scan for stones.  2. Gross hematuria  -     CT Abdomen Pelvis Stone Protocol; Future  See above  3. Other acute back pain  -     CT Abdomen Pelvis Stone Protocol; Future  See above  4. Dysuria  -     Urine Culture - Urine, Urine, Clean Catch; Future  -     POC Urinalysis Dipstick, Automated  -     CT Abdomen Pelvis Stone Protocol; Future  -     Urine Culture - Urine, Urine, Clean Catch             Follow Up   No follow-ups on file.  Patient was given instructions and counseling regarding her condition or for health maintenance advice. Please see specific information pulled into the AVS if appropriate.

## 2022-10-26 ENCOUNTER — TELEPHONE (OUTPATIENT)
Dept: FAMILY MEDICINE CLINIC | Facility: CLINIC | Age: 52
End: 2022-10-26

## 2022-10-26 DIAGNOSIS — R31.0 GROSS HEMATURIA: ICD-10-CM

## 2022-10-26 DIAGNOSIS — N30.01 ACUTE CYSTITIS WITH HEMATURIA: Primary | ICD-10-CM

## 2022-10-26 NOTE — TELEPHONE ENCOUNTER
Patient was actually calling about CT results. Someone else from [facility] was ringing in about the results during our phone call so I let her go and she will get the results from the other person.

## 2022-10-26 NOTE — TELEPHONE ENCOUNTER
Spoke with patient on the phone and apologized for CT scan repost taking so long. Patient states that she is feeling better today. She states that the had a fever yesterday and last night. She states that last night got to 103. She states that she is now on antibiotics  And feeling better with lower temp this am She is still drinking and eating. Discussed results of CT scan - 2 mm stone that is non obstructing and no hydronephrosis. Will work on getting her in to see Urology Asap. Will harriet her back today with details. Advised patient that if temp goes up again to 102/103 to go to ER.

## 2022-10-29 LAB
BACTERIA UR CULT: ABNORMAL
BACTERIA UR CULT: ABNORMAL
OTHER ANTIBIOTIC SUSC ISLT: ABNORMAL

## 2022-10-31 RX ORDER — PANTOPRAZOLE SODIUM 40 MG/1
TABLET, DELAYED RELEASE ORAL
Qty: 90 TABLET | Refills: 0 | Status: SHIPPED | OUTPATIENT
Start: 2022-10-31 | End: 2023-01-20

## 2022-10-31 NOTE — PROGRESS NOTES
Please call patient and let her know that her urine grew out e.coli which is a common bug in UTIs. It was susceptible to all antibiotics. Please see how things are going after her urology appointment on friday

## 2022-12-10 RX ORDER — EMPAGLIFLOZIN 25 MG/1
TABLET, FILM COATED ORAL
Qty: 90 TABLET | Refills: 0 | Status: SHIPPED | OUTPATIENT
Start: 2022-12-10 | End: 2023-02-02 | Stop reason: SDUPTHER

## 2022-12-19 ENCOUNTER — LAB (OUTPATIENT)
Dept: FAMILY MEDICINE CLINIC | Facility: CLINIC | Age: 52
End: 2022-12-19

## 2022-12-19 DIAGNOSIS — E55.9 VITAMIN D DEFICIENCY: ICD-10-CM

## 2022-12-19 DIAGNOSIS — E03.9 ACQUIRED HYPOTHYROIDISM: ICD-10-CM

## 2022-12-19 DIAGNOSIS — E11.9 TYPE 2 DIABETES MELLITUS WITHOUT COMPLICATION, WITHOUT LONG-TERM CURRENT USE OF INSULIN: ICD-10-CM

## 2022-12-19 DIAGNOSIS — E03.9 ACQUIRED HYPOTHYROIDISM: Primary | ICD-10-CM

## 2022-12-19 PROCEDURE — 36415 COLL VENOUS BLD VENIPUNCTURE: CPT | Performed by: FAMILY MEDICINE

## 2022-12-20 LAB
25(OH)D3+25(OH)D2 SERPL-MCNC: 23.9 NG/ML (ref 30–100)
ALBUMIN SERPL-MCNC: 4.5 G/DL (ref 3.8–4.9)
ALBUMIN/GLOB SERPL: 1.8 {RATIO} (ref 1.2–2.2)
ALP SERPL-CCNC: 112 IU/L (ref 44–121)
ALT SERPL-CCNC: 18 IU/L (ref 0–32)
AST SERPL-CCNC: 18 IU/L (ref 0–40)
BILIRUB SERPL-MCNC: <0.2 MG/DL (ref 0–1.2)
BUN SERPL-MCNC: 15 MG/DL (ref 6–24)
BUN/CREAT SERPL: 21 (ref 9–23)
CALCIUM SERPL-MCNC: 9.6 MG/DL (ref 8.7–10.2)
CHLORIDE SERPL-SCNC: 111 MMOL/L (ref 96–106)
CHOLEST SERPL-MCNC: 194 MG/DL (ref 100–199)
CO2 SERPL-SCNC: 16 MMOL/L (ref 20–29)
CREAT SERPL-MCNC: 0.71 MG/DL (ref 0.57–1)
EGFRCR SERPLBLD CKD-EPI 2021: 102 ML/MIN/1.73
GLOBULIN SER CALC-MCNC: 2.5 G/DL (ref 1.5–4.5)
GLUCOSE SERPL-MCNC: ABNORMAL MG/DL
HBA1C MFR BLD: 7.1 % (ref 4.8–5.6)
HDLC SERPL-MCNC: 56 MG/DL
LDLC SERPL CALC-MCNC: 110 MG/DL (ref 0–99)
POTASSIUM SERPL-SCNC: ABNORMAL MMOL/L
PROT SERPL-MCNC: 7 G/DL (ref 6–8.5)
SODIUM SERPL-SCNC: 147 MMOL/L (ref 134–144)
TRIGL SERPL-MCNC: 160 MG/DL (ref 0–149)
TSH SERPL DL<=0.005 MIU/L-ACNC: 6.12 UIU/ML (ref 0.45–4.5)
VLDLC SERPL CALC-MCNC: 28 MG/DL (ref 5–40)

## 2022-12-21 ENCOUNTER — PATIENT MESSAGE (OUTPATIENT)
Dept: FAMILY MEDICINE CLINIC | Facility: CLINIC | Age: 52
End: 2022-12-21

## 2022-12-21 RX ORDER — LEVOTHYROXINE SODIUM 0.05 MG/1
50 TABLET ORAL DAILY
COMMUNITY
End: 2022-12-21 | Stop reason: SDUPTHER

## 2022-12-21 RX ORDER — LEVOTHYROXINE SODIUM 0.05 MG/1
50 TABLET ORAL DAILY
Qty: 90 TABLET | Refills: 0 | Status: SHIPPED | OUTPATIENT
Start: 2022-12-21 | End: 2023-02-02 | Stop reason: SDUPTHER

## 2022-12-27 ENCOUNTER — TELEPHONE (OUTPATIENT)
Dept: FAMILY MEDICINE CLINIC | Facility: CLINIC | Age: 52
End: 2022-12-27
Payer: COMMERCIAL

## 2022-12-27 ENCOUNTER — PATIENT MESSAGE (OUTPATIENT)
Dept: FAMILY MEDICINE CLINIC | Facility: CLINIC | Age: 52
End: 2022-12-27
Payer: COMMERCIAL

## 2022-12-27 DIAGNOSIS — E03.9 ACQUIRED HYPOTHYROIDISM: Primary | ICD-10-CM

## 2022-12-27 NOTE — TELEPHONE ENCOUNTER
Caller: Porsha Bolden    Relationship: Self    Best call back number: [phone]    What is the best time to reach you: ANY    Who are you requesting to speak with (clinical staff, provider,  specific staff member): DR WONG    Do you know the name of the person who called: RECEIVED A CALL STATING DR WONG WOULD BE OUT- PATIENT WANTS TO TALK TO DR WONG.     What was the call regarding: BUMPED APPOINTMENT AND ALSO MEDICATION REVIEW.    Do you require a callback: YES, PLEASE. PATIENT WANTS TO TALK TO DR WONG AS SOON AS POSSIBLE.  STATES HE WAS TO FOLLOW LABS AND POSSIBLE CHANGE MEDICATIONS. PATIENT WANTS TO DISCUSS THE BUMPED APPOINTMENT TO FIRST AVAILABLE IN February 2, 2023.     PLEASE CALL AS SOON AS POSSIBLE.    PATIENT HAD BAD CONNECTION- DISCONNECTED BEFORE COMPLETING CALL

## 2023-01-03 RX ORDER — CELECOXIB 200 MG/1
CAPSULE ORAL
Qty: 90 CAPSULE | Refills: 0 | Status: SHIPPED | OUTPATIENT
Start: 2023-01-03 | End: 2023-01-07 | Stop reason: SDUPTHER

## 2023-01-05 NOTE — TELEPHONE ENCOUNTER
From: Porsha Bolden  To: Jason Benson  Sent: 12/27/2022 2:32 PM EST  Subject: FYI.New meds for Spine Pain and spasms    1 med pack of Methylpred 4mg cynthia cad has been prescribed as well as a   A thirty day prescription of TiZANidine 4mg tab dr has been prescribed as well to help with spasms and issues related to the cervical left and right RFA’s done.     I was gonna tell you next week when I saw you but they just rescheduled me out to February.     Question: I had the blood draw, which is how we found the problem with the thyroid medicine.   Do you need me to come in at the end of January for more bloodwork? I have to come in to do some for Dr. Terry but I’m not sure it includes the panels you check for. Do you want to call in an order for that?

## 2023-01-07 DIAGNOSIS — M54.50 ACUTE LOW BACK PAIN WITHOUT SCIATICA, UNSPECIFIED BACK PAIN LATERALITY: ICD-10-CM

## 2023-01-10 RX ORDER — CELECOXIB 200 MG/1
200 CAPSULE ORAL DAILY
Qty: 90 CAPSULE | Refills: 0 | Status: SHIPPED | OUTPATIENT
Start: 2023-01-10 | End: 2023-02-02 | Stop reason: SDUPTHER

## 2023-01-10 RX ORDER — GABAPENTIN 600 MG/1
600 TABLET ORAL 3 TIMES DAILY
Qty: 270 TABLET | Refills: 1 | Status: SHIPPED | OUTPATIENT
Start: 2023-01-10

## 2023-01-17 ENCOUNTER — TELEPHONE (OUTPATIENT)
Dept: FAMILY MEDICINE CLINIC | Facility: CLINIC | Age: 53
End: 2023-01-17
Payer: COMMERCIAL

## 2023-01-17 DIAGNOSIS — E11.9 TYPE 2 DIABETES MELLITUS WITHOUT COMPLICATION, WITHOUT LONG-TERM CURRENT USE OF INSULIN: ICD-10-CM

## 2023-01-17 DIAGNOSIS — E03.9 ACQUIRED HYPOTHYROIDISM: Primary | ICD-10-CM

## 2023-01-17 NOTE — TELEPHONE ENCOUNTER
PATIENT IS COMING IN ON THE 25TH FOR BLOOD WORK. SHE HAS AN APPT WITH YOU 2.2.23 AND WOULD LIKE BLOODWORK DONE BEFORE APPT. PLEASE PLACE LAB ORDERS.

## 2023-01-20 RX ORDER — PANTOPRAZOLE SODIUM 40 MG/1
TABLET, DELAYED RELEASE ORAL
Qty: 90 TABLET | Refills: 0 | Status: SHIPPED | OUTPATIENT
Start: 2023-01-20 | End: 2023-04-03

## 2023-01-25 ENCOUNTER — TELEPHONE (OUTPATIENT)
Dept: ONCOLOGY | Facility: CLINIC | Age: 53
End: 2023-01-25
Payer: COMMERCIAL

## 2023-01-25 ENCOUNTER — LAB (OUTPATIENT)
Dept: FAMILY MEDICINE CLINIC | Facility: CLINIC | Age: 53
End: 2023-01-25
Payer: COMMERCIAL

## 2023-01-25 DIAGNOSIS — E11.9 TYPE 2 DIABETES MELLITUS WITHOUT COMPLICATION, WITHOUT LONG-TERM CURRENT USE OF INSULIN: ICD-10-CM

## 2023-01-25 DIAGNOSIS — E03.9 ACQUIRED HYPOTHYROIDISM: ICD-10-CM

## 2023-01-25 PROCEDURE — 36415 COLL VENOUS BLD VENIPUNCTURE: CPT | Performed by: FAMILY MEDICINE

## 2023-01-25 NOTE — TELEPHONE ENCOUNTER
Caller: FAWN    Relationship: [facility]     Best call back number: [phone]      What was the call regarding: CALLING TO CLARIFY MELISA ORDERS.     PATIENT HAS ARRIVED FOR SCREENING    Do you require a callback: YES

## 2023-01-25 NOTE — TELEPHONE ENCOUNTER
TT makayla at the number given. Patient has order for a diagnostic mammo but patient states is having no issues can they do a screening. TT NERI Villalba and states the screening is fine. Called Makayla and gave ok for screening mammo. Voiced verbal understanding.

## 2023-01-26 LAB
ALBUMIN SERPL-MCNC: 4.6 G/DL (ref 3.8–4.9)
ALBUMIN/GLOB SERPL: 2 {RATIO} (ref 1.2–2.2)
ALP SERPL-CCNC: 88 IU/L (ref 44–121)
ALT SERPL-CCNC: 15 IU/L (ref 0–32)
AST SERPL-CCNC: 21 IU/L (ref 0–40)
BASOPHILS # BLD AUTO: 0.1 X10E3/UL (ref 0–0.2)
BASOPHILS NFR BLD AUTO: 1 %
BILIRUB SERPL-MCNC: 0.4 MG/DL (ref 0–1.2)
BUN SERPL-MCNC: 23 MG/DL (ref 6–24)
BUN/CREAT SERPL: 29 (ref 9–23)
CALCIUM SERPL-MCNC: 9.9 MG/DL (ref 8.7–10.2)
CHLORIDE SERPL-SCNC: 101 MMOL/L (ref 96–106)
CHOLEST SERPL-MCNC: 195 MG/DL (ref 100–199)
CO2 SERPL-SCNC: 20 MMOL/L (ref 20–29)
CREAT SERPL-MCNC: 0.8 MG/DL (ref 0.57–1)
EGFRCR SERPLBLD CKD-EPI 2021: 88 ML/MIN/1.73
EOSINOPHIL # BLD AUTO: 0.2 X10E3/UL (ref 0–0.4)
EOSINOPHIL NFR BLD AUTO: 4 %
ERYTHROCYTE [DISTWIDTH] IN BLOOD BY AUTOMATED COUNT: 13.5 % (ref 11.7–15.4)
GLOBULIN SER CALC-MCNC: 2.3 G/DL (ref 1.5–4.5)
GLUCOSE SERPL-MCNC: 102 MG/DL (ref 70–99)
HBA1C MFR BLD: 7.4 % (ref 4.8–5.6)
HCT VFR BLD AUTO: 42.8 % (ref 34–46.6)
HDLC SERPL-MCNC: 65 MG/DL
HGB BLD-MCNC: 14.3 G/DL (ref 11.1–15.9)
IMM GRANULOCYTES # BLD AUTO: 0 X10E3/UL (ref 0–0.1)
IMM GRANULOCYTES NFR BLD AUTO: 1 %
LDLC SERPL CALC-MCNC: 103 MG/DL (ref 0–99)
LYMPHOCYTES # BLD AUTO: 2.5 X10E3/UL (ref 0.7–3.1)
LYMPHOCYTES NFR BLD AUTO: 40 %
MCH RBC QN AUTO: 28.1 PG (ref 26.6–33)
MCHC RBC AUTO-ENTMCNC: 33.4 G/DL (ref 31.5–35.7)
MCV RBC AUTO: 84 FL (ref 79–97)
MONOCYTES # BLD AUTO: 0.4 X10E3/UL (ref 0.1–0.9)
MONOCYTES NFR BLD AUTO: 6 %
NEUTROPHILS # BLD AUTO: 3.1 X10E3/UL (ref 1.4–7)
NEUTROPHILS NFR BLD AUTO: 48 %
PLATELET # BLD AUTO: 319 X10E3/UL (ref 150–450)
POTASSIUM SERPL-SCNC: 4.5 MMOL/L (ref 3.5–5.2)
PROT SERPL-MCNC: 6.9 G/DL (ref 6–8.5)
RBC # BLD AUTO: 5.08 X10E6/UL (ref 3.77–5.28)
SODIUM SERPL-SCNC: 139 MMOL/L (ref 134–144)
TRIGL SERPL-MCNC: 155 MG/DL (ref 0–149)
TSH SERPL DL<=0.005 MIU/L-ACNC: 2.04 UIU/ML (ref 0.45–4.5)
VLDLC SERPL CALC-MCNC: 27 MG/DL (ref 5–40)
WBC # BLD AUTO: 6.3 X10E3/UL (ref 3.4–10.8)

## 2023-01-31 ENCOUNTER — TELEPHONE (OUTPATIENT)
Dept: ONCOLOGY | Facility: CLINIC | Age: 53
End: 2023-01-31
Payer: COMMERCIAL

## 2023-02-02 ENCOUNTER — OFFICE VISIT (OUTPATIENT)
Dept: FAMILY MEDICINE CLINIC | Facility: CLINIC | Age: 53
End: 2023-02-02
Payer: COMMERCIAL

## 2023-02-02 VITALS
HEIGHT: 67 IN | DIASTOLIC BLOOD PRESSURE: 68 MMHG | BODY MASS INDEX: 25.43 KG/M2 | WEIGHT: 162 LBS | OXYGEN SATURATION: 98 % | HEART RATE: 97 BPM | SYSTOLIC BLOOD PRESSURE: 118 MMHG

## 2023-02-02 DIAGNOSIS — M54.50 ACUTE LOW BACK PAIN WITHOUT SCIATICA, UNSPECIFIED BACK PAIN LATERALITY: ICD-10-CM

## 2023-02-02 DIAGNOSIS — G25.81 RLS (RESTLESS LEGS SYNDROME): ICD-10-CM

## 2023-02-02 DIAGNOSIS — F41.9 ANXIETY: Chronic | ICD-10-CM

## 2023-02-02 DIAGNOSIS — T45.1X5A HOT FLASHES DUE TO TAMOXIFEN: Chronic | ICD-10-CM

## 2023-02-02 DIAGNOSIS — R23.2 HOT FLASHES DUE TO TAMOXIFEN: Chronic | ICD-10-CM

## 2023-02-02 DIAGNOSIS — E03.9 ACQUIRED HYPOTHYROIDISM: ICD-10-CM

## 2023-02-02 DIAGNOSIS — E11.9 TYPE 2 DIABETES MELLITUS WITHOUT COMPLICATION, WITHOUT LONG-TERM CURRENT USE OF INSULIN: Primary | ICD-10-CM

## 2023-02-02 DIAGNOSIS — R25.1 TREMOR: Chronic | ICD-10-CM

## 2023-02-02 PROCEDURE — 99214 OFFICE O/P EST MOD 30 MIN: CPT | Performed by: FAMILY MEDICINE

## 2023-02-02 RX ORDER — TIZANIDINE 4 MG/1
TABLET ORAL
COMMUNITY
Start: 2022-12-22 | End: 2023-02-02 | Stop reason: SDUPTHER

## 2023-02-02 RX ORDER — PRIMIDONE 50 MG/1
100 TABLET ORAL NIGHTLY
Qty: 60 TABLET | Refills: 5 | Status: SHIPPED | OUTPATIENT
Start: 2023-02-02

## 2023-02-02 RX ORDER — ESCITALOPRAM OXALATE 10 MG/1
10 TABLET ORAL DAILY
Qty: 90 TABLET | Refills: 3 | Status: SHIPPED | OUTPATIENT
Start: 2023-02-02

## 2023-02-02 RX ORDER — LEVOTHYROXINE SODIUM 0.05 MG/1
50 TABLET ORAL DAILY
Qty: 90 TABLET | Refills: 1 | Status: SHIPPED | OUTPATIENT
Start: 2023-02-02

## 2023-02-02 RX ORDER — TIZANIDINE 4 MG/1
4 TABLET ORAL NIGHTLY PRN
Qty: 90 TABLET | Refills: 1 | Status: SHIPPED | OUTPATIENT
Start: 2023-02-02

## 2023-02-02 RX ORDER — TRAMADOL HYDROCHLORIDE 50 MG/1
50 TABLET ORAL 3 TIMES DAILY
Qty: 270 TABLET | Refills: 0 | Status: SHIPPED | OUTPATIENT
Start: 2023-02-02 | End: 2023-03-29 | Stop reason: SDUPTHER

## 2023-02-02 RX ORDER — CELECOXIB 200 MG/1
200 CAPSULE ORAL DAILY
Qty: 90 CAPSULE | Refills: 0 | Status: SHIPPED | OUTPATIENT
Start: 2023-02-02 | End: 2023-03-29 | Stop reason: SDUPTHER

## 2023-02-02 NOTE — PROGRESS NOTES
Answers for HPI/ROS submitted by the patient on 2023  Please describe your symptoms.: Three month standard appt. , Diabetes, thyroid, neurological pain.  Have you had these symptoms before?: Yes  How long have you been having these symptoms?: Greater than 2 weeks  Please list any medications you are currently taking for this condition.: Various  Please describe any probable cause for these symptoms. : A1C increased due to steroids used in spinal injections. , Thyroid medicine increased; returning to see if the increased helped with functional.  What is the primary reason for your visit?: Other         Follow Up Office Visit      Date of Visit:  2023   Patient Name: Porsha Bolden  : 1970   MRN: [id number]     Chief Complaint:    Chief Complaint   Patient presents with   • Hypothyroidism       History of Present Illness: Porsha Bolden is a 52 y.o. female who is here today for follow up.  Patient actually seen for multiple reasons today.  She has following up on her thyroid.  After adjusting her dose of medication her level is now normal.  She does need refills on medication.  She also needs refills on multiple other medications.  She is currently on clonidine to help with hot flashes due to tamoxifen.  She needs refills on this medication.  Patient also with low back pain for which she currently takes a muscle relaxer as well as tramadol.  She is also seeing a pain specialist.  She needs refills on this medication.  Routine medicine refills also on her Lexapro for anxiety.  Tremor and restless leg movements were improved with primidone.  She does think the dose could be increased a bit.      Subjective      Review of Systems:   Review of Systems   Constitutional: Negative for fatigue and fever.   HENT: Negative for congestion and ear pain.    Respiratory: Negative for apnea, cough, chest tightness and shortness of breath.    Cardiovascular: Negative for chest pain.    Gastrointestinal: Negative for abdominal pain, constipation, diarrhea and nausea.   Musculoskeletal: Positive for arthralgias.   Neurological: Positive for tremors.   Psychiatric/Behavioral: Negative for depressed mood and stress.       Past Medical History:   Past Medical History:   Diagnosis Date   • Breast cancer (HCC) 2018   • Diabetes mellitus (HCC)    • Disease of thyroid gland        Past Surgical History:   Past Surgical History:   Procedure Laterality Date   • BREAST LUMPECTOMY Left 02/2018   • HYSTERECTOMY      fibroid   • KNEE ARTHROSCOPY  2004   • KNEE SURGERY      x4   • REFRACTIVE SURGERY      Lasik       Family History:   Family History   Problem Relation Age of Onset   • Uterine cancer Mother    • Lung cancer Father    • Brain cancer Father 69   • Breast cancer Sister    • Brain cancer Paternal Grandmother        Social History:   Social History     Socioeconomic History   • Marital status:    Tobacco Use   • Smoking status: Never   • Smokeless tobacco: Never   Substance and Sexual Activity   • Alcohol use: Yes     Comment: twice per year       Medications:     Current Outpatient Medications:   •  celecoxib (CeleBREX) 200 MG capsule, Take 1 capsule by mouth Daily., Disp: 90 capsule, Rfl: 0  •  empagliflozin (Jardiance) 25 MG tablet tablet, Take 1 tablet by mouth Every Morning., Disp: 90 tablet, Rfl: 1  •  escitalopram (LEXAPRO) 10 MG tablet, Take 1 tablet by mouth Daily., Disp: 90 tablet, Rfl: 3  •  levothyroxine (SYNTHROID, LEVOTHROID) 50 MCG tablet, Take 1 tablet by mouth Daily., Disp: 90 tablet, Rfl: 1  •  primidone (MYSOLINE) 50 MG tablet, Take 2 tablets by mouth Every Night., Disp: 60 tablet, Rfl: 5  •  traMADol (ULTRAM) 50 MG tablet, Take 1 tablet by mouth 3 (Three) Times a Day., Disp: 270 tablet, Rfl: 0  •  cloNIDine (CATAPRES) 0.1 MG tablet, TAKE 1 TABLET BY MOUTH TWICE DAILY FOR HOT FLASHES, Disp: 180 tablet, Rfl: 3  •  famotidine (PEPCID) 20 MG tablet, Take 1 tablet by mouth.,  "Disp: , Rfl:   •  gabapentin (NEURONTIN) 600 MG tablet, Take 1 tablet by mouth 3 (Three) Times a Day., Disp: 270 tablet, Rfl: 1  •  levocetirizine (XYZAL) 5 MG tablet, Take 5 mg by mouth Every Evening., Disp: , Rfl:   •  metFORMIN (GLUCOPHAGE) 1000 MG tablet, TAKE 1 TABLET TWICE A DAY  WITH MORNING AND EVENING   MEALS, Disp: 180 tablet, Rfl: 0  •  mometasone (ELOCON) 0.1 % cream, Apply 1 application topically to the appropriate area as directed., Disp: , Rfl:   •  Ozempic, 1 MG/DOSE, 4 MG/3ML solution pen-injector, INJECT 1MG SUBCUTANEOUSLY  INTO THE APPROPRIATE AREA  AS DIRECTED 1 TIME PER WEEK, Disp: 9 mL, Rfl: 1  •  pantoprazole (PROTONIX) 40 MG EC tablet, TAKE 1 TABLET DAILY, Disp: 90 tablet, Rfl: 0  •  tamoxifen (NOLVADEX) 10 MG tablet, TAKE 1 TABLET TWICE A DAY, Disp: 180 tablet, Rfl: 3  •  tiZANidine (ZANAFLEX) 4 MG tablet, Take 1 tablet by mouth At Night As Needed for Muscle Spasms., Disp: 90 tablet, Rfl: 1  •  triamcinolone (KENALOG) 0.1 % ointment, Apply To Affected Area As Needed, Disp: , Rfl:     Allergies:   Allergies   Allergen Reactions   • Codeine GI Intolerance   • Morphine GI Intolerance   • Hydrocodone-Acetaminophen Itching   • Cytoxan [Cyclophosphamide] Hives   • Fluticasone Other (See Comments)   • Ginger Other (See Comments)   • Taxotere [Docetaxel] Hives   • Vilanterol Other (See Comments)   • Zyrtec [Cetirizine] Rash       Objective     Physical Exam:  Vital Signs:   Vitals:    02/02/23 0956   BP: 118/68   Pulse: 97   SpO2: 98%   Weight: 73.5 kg (162 lb)   Height: 170.2 cm (67\")     Body mass index is 25.37 kg/m².     Physical Exam  Vitals and nursing note reviewed.   Constitutional:       General: She is not in acute distress.     Appearance: Normal appearance. She is not ill-appearing.   HENT:      Head: Normocephalic and atraumatic.      Right Ear: Tympanic membrane and ear canal normal.      Left Ear: Tympanic membrane and ear canal normal.      Nose: Nose normal.   Cardiovascular:      " Rate and Rhythm: Normal rate and regular rhythm.      Heart sounds: Normal heart sounds.   Pulmonary:      Effort: Pulmonary effort is normal.      Breath sounds: Normal breath sounds.   Neurological:      Mental Status: She is alert and oriented to person, place, and time. Mental status is at baseline.   Psychiatric:         Mood and Affect: Mood normal.         Procedures      Assessment / Plan      Assessment/Plan:   Diagnoses and all orders for this visit:    1. Type 2 diabetes mellitus without complication, without long-term current use of insulin (HCC) (Primary)    2. Hot flashes due to tamoxifen  Comments:  Continue clonidine  Orders:  -     escitalopram (LEXAPRO) 10 MG tablet; Take 1 tablet by mouth Daily.  Dispense: 90 tablet; Refill: 3    3. Acute low back pain without sciatica, unspecified back pain laterality  Comments:  Continue current medications for low back pain.  Continue to see specialist.  Jack report appropriate. medication refills given.  Orders:  -     traMADol (ULTRAM) 50 MG tablet; Take 1 tablet by mouth 3 (Three) Times a Day.  Dispense: 270 tablet; Refill: 0    4. Anxiety  Comments:  Stable on chronic medication.  Medication refills given.    5. Acquired hypothyroidism  Comments:  Level now more appropriate.  Continue current medication.  Medication refills given.    6. Tremor  Comments:  Increased dose of primidone.    7. RLS (restless legs syndrome)  Comments:  See above.    Other orders  -     primidone (MYSOLINE) 50 MG tablet; Take 2 tablets by mouth Every Night.  Dispense: 60 tablet; Refill: 5  -     tiZANidine (ZANAFLEX) 4 MG tablet; Take 1 tablet by mouth At Night As Needed for Muscle Spasms.  Dispense: 90 tablet; Refill: 1  -     levothyroxine (SYNTHROID, LEVOTHROID) 50 MCG tablet; Take 1 tablet by mouth Daily.  Dispense: 90 tablet; Refill: 1  -     empagliflozin (Jardiance) 25 MG tablet tablet; Take 1 tablet by mouth Every Morning.  Dispense: 90 tablet; Refill: 1  -     celecoxib  (CeleBREX) 200 MG capsule; Take 1 capsule by mouth Daily.  Dispense: 90 capsule; Refill: 0             Follow Up:   No follow-ups on file.    Jason Benson  [facility]

## 2023-02-14 ENCOUNTER — OFFICE VISIT (OUTPATIENT)
Dept: ONCOLOGY | Facility: CLINIC | Age: 53
End: 2023-02-14
Payer: COMMERCIAL

## 2023-02-14 VITALS
SYSTOLIC BLOOD PRESSURE: 131 MMHG | OXYGEN SATURATION: 98 % | WEIGHT: 157 LBS | DIASTOLIC BLOOD PRESSURE: 91 MMHG | TEMPERATURE: 97.3 F | HEART RATE: 98 BPM | BODY MASS INDEX: 24.64 KG/M2 | RESPIRATION RATE: 18 BRPM | HEIGHT: 67 IN

## 2023-02-14 DIAGNOSIS — Z17.0 MALIGNANT NEOPLASM OF UPPER-OUTER QUADRANT OF LEFT BREAST IN FEMALE, ESTROGEN RECEPTOR POSITIVE: Primary | ICD-10-CM

## 2023-02-14 DIAGNOSIS — C50.412 MALIGNANT NEOPLASM OF UPPER-OUTER QUADRANT OF LEFT BREAST IN FEMALE, ESTROGEN RECEPTOR POSITIVE: Primary | ICD-10-CM

## 2023-02-14 PROCEDURE — 99215 OFFICE O/P EST HI 40 MIN: CPT | Performed by: INTERNAL MEDICINE

## 2023-02-14 NOTE — PROGRESS NOTES
CHIEF COMPLAINT: No new somatic complaints    Problem List:  Oncology/Hematology History Overview Note   1.  Left breast cancer: She had breast cancer biopsied 2/6/18 by Dr. marquez showing infiltrating ductal carcinoma [place] 6 out of 9, 60% 1+ HER-2/magaly, ER greater than 95% 2+, AL 95% 2-3+.  On 3/13/18 she had lumpectomy for a 2.5 cm 0 out of one grade 2 infiltrating lobular carcinoma.  Mammaprint came back with a 10% distant risk of recurrence on hormonal blockade alone.  Because of the tumor over 2 cm in size, Dr. Tao recommended Taxotere and Cytoxan but after the second course of therapy she had a severe allergic reaction with hives.  She received 30 radiation treatments, completed 07/2018 and, given a history of DEANA without BSO for fibroids in 2012, Dr. Toya Carrillo due to her hormonal levels and found her to be premenopausal.  Hence she was started on tamoxifen for which she has been taking gabapentin for hot flashes.  Still having a lot of dermatographia is.  Has a hiatal hernia as well as a hearing loss since that time.  She came to me for the first time 10/9/18 asking me to assume her care.  She was concerned regarding some hip pain for which she had been told in the past there was some tumor in the abdomen and seeing orthopedics for was told she did not need to do anything about it.  She also had hip injections for spinal stenosis with neurology and movable.  I asked her to get her prior bone imaging reports and discs and I'm getting a total body bone scan.  She had previous genetic testing August 2018 negative for BRCA and she is going to get those reports for rest of review what genetic test were done.  Assuming the bone scan is unremarkable and the outside imaging does not suggest any other worrisome bony abnormalities and I certainly think at least 5 years of tamoxifen is reasonable.  She will continue with the gabapentin.     Per records from Dr. Dr. Tao, she completed radiation  to the left breast 7/30/18.  Because of shortness of breath with pleurisy, CT PE protocol 5/8/18 was done and no PE found.  ENT evaluation 5/17/18 with flexible laryngoscopy showed widely patent airway and prednisone was tapered post hypersensitivity reaction to the Taxotere.  Per note from . Dr. Tao of 8/27/18, BRCA1 and BRCA2 mutation analysis was ordered.  This was done with [employer] which showed no clinically significant mutation but there was a variant of uncertain significance found.  I recommended repeating this with cancernext panel for more thorough evaluation.  According to a note from her primary care in 2015 there was mention of an MRI showing no change in a thigh lipoma which I suspect is the mass she is referring to.  I performed total body bone scan 10/23/18 which was entirely negative.  She does have a large mass in the superior lateral aspect of her left thigh which has not grown and likely lipomatous but given the size of this, I am referring her to Bob Parham for evaluation.  She brought her discs with her on her visit to see me on 11/13/18 and she will take those to Dr. Parham and then bring them back to me for us to load into our system for the future.  In the meantime I will get her to our genetic counselors for further testing and to have her monitored over time in the event that her variant of undetermined significance becomes one that is determined to be significant.      -12/7/2018 [facility] 28 gene myRisk panel showed 2 variants of uncertain significance in BARD 1 and PMS 2    -2/4/2019 consultation Dr. Bob Parham showed left hip/thigh lipoma.  He recommended no surgery and just physical therapy for trochanteric bursitis.    -12/10/2020 follow-up with Dr. Serrano for fibromyalgia and osteoarthritis.    -7/28/2021 [facility] follow-up visit: No evidence of recurrent breast cancer on exam.  She is having some symptoms however that are  concerning.  She has worsening bone pain particularly in her lower extremities, I will get a bone scan for further evaluation.  She also has intermittent abdominal pain and nausea.  She has a persistently high hemoglobin A1c despite cutting out sugar from her diet and being very carbohydrate restricted.  I will get a CT scan of her abdomen and pelvis.  I will call her with the results of both of these.  Assuming these are negative then we will plan on seeing her back for follow-up in 6 months.  She states that she may see an endocrinologist that one of her friends has recommended for her hyperglycemia.  Hopefully the Otezla will help.     She will continue tamoxifen 10 mg twice a day, she did not need a refill.  We will get her next annual mammogram in January and I have ordered that today.  Her liver enzymes were normal on recent blood work.  We will stop tamoxifen at 5 years which is July 2023.     Return to clinic in 6 months for follow-up, after her mammogram.  If she is doing well at that time we can then go to annual follow-ups as she sees Dr. Jennings in the summer therefore we could see her in the winter.     -8/10/2021 CT abdomen pelvis with contrast showed no obstruction or other causes for abdominal pain.  Right 4 mm inferior pole nephrolithiasis.  Hepatic steatosis.  Total body bone scan done because of leg pain showed degenerative joint disease of the cervical and upper thoracic spine only minimally increased compared to January 2020 with stable low level degenerative joint disease elsewhere and no suspicious metastases.    -1/24/2022 right breast ultrasound and bilateral diagnostic mammogram [facility] BI-RADS 2 with recommended diagnostic mammogram in 12 months bilateral.      -2/1/2022 [facility] medical oncology follow-up telehealth visit: Though rapid test negative, she has had some URI symptoms that had her concerned so she did not want to come in and expose her cancer patient so we did a  virtual visit.  Other than for her URI symptoms which are modest and free of fevers she has no other complaints.  She has lost a lot of weight on a keto plan.  We will continue tamoxifen through at least the summer 2023.  We will get mammogram again prior to return in a year and in the meantime I will get her breast cancer index per her request and if she has high benefit then she may be willing to put up with another couple of years of tamoxifen but if she continues to suffer from hot flashes, she is not sure if she will take extended adjuvant. Normal liver enzymes July 2021.  She will get gynecologic and physical breast exam with her gynecologist or primary care within the next 6 months.     Malignant neoplasm of upper-outer quadrant of left breast in female, estrogen receptor positive (HCC)   2/6/2018 Initial Diagnosis    Malignant neoplasm of upper-outer quadrant of breast in female, estrogen receptor positive (CMS/HCC)     7/2/2018 -  Hormonal Therapy    Tamoxifen began after radiation     10/23/2018 Imaging    Total body bone scan     12/7/2018 Genetic Testing    Genetic testing Results were obtained from [employer] confirming that a 28 gene [employer] panel had been performed, which included all high and moderate risk breast cancer genes as well as genes related to other cancer risks.  Two variants of uncertain significance (VUS) were identified, in the BARD1 and PMS2 genes.  The ClinVar database was referenced, which did not show any alternative interpretations of either VUS.          1/14/2020 Imaging    1/14/2020 mammogram BI-RADS VI with recommended unilateral follow-up left mammogram in July.     1/24/2020 Imaging     bone scan stable with no progressive abnormalities to explain left rib pain     12/10/2020 Imaging    DEXA bone density testing normal.     1/21/2021 Imaging    -1/21/2021 bilateral diagnostic mammogram BI-RADS 2.     Lipoma of left lower extremity   3/23/2015 Initial Diagnosis    Mass of  left thigh     3/23/2015 Imaging    MRI left lower extremity Impression:  Abnormal left tensor fascial des muscle of uncertain significance.  That signal may represent a lipoma but is more infiltrative and unusual.  Other neoplastic process including liposarcoma cannot be excluded.  Mild contrast enhancement and air present muscle injury, muscle inflammation or less likely, neoplastic involvement.     1/17/2019 Imaging    MRI right gluteal region showed fatty tumor posteriorly in the fascia varun     2/4/2019 -  Other Event    Consultation with Dr. Bob Parham at the [facility] for left thigh lipoma,  recommended physical therapy for her snapping hip and trocar bursitis, in regard to the tumor no plans on surgically resecting.  Follow-up PRN.         HISTORY OF PRESENT ILLNESS:  The patient is a 52 y.o. female, here for follow up on management of breast cancer now on hormone blockade    Past Medical History:   Diagnosis Date   • Breast cancer (HCC) 2018   • Diabetes mellitus (HCC)    • Disease of thyroid gland      Past Surgical History:   Procedure Laterality Date   • BREAST LUMPECTOMY Left 02/2018   • HYSTERECTOMY      fibroid   • KNEE ARTHROSCOPY  2004   • KNEE SURGERY      x4   • REFRACTIVE SURGERY      Lasik       Allergies   Allergen Reactions   • Codeine GI Intolerance   • Morphine GI Intolerance   • Hydrocodone-Acetaminophen Itching   • Cytoxan [Cyclophosphamide] Hives   • Fluticasone Other (See Comments)   • Eliana Other (See Comments)   • Taxotere [Docetaxel] Hives   • Vilanterol Other (See Comments)   • Zyrtec [Cetirizine] Rash       Family History and Social History reviewed and changed as necessary    REVIEW OF SYSTEM:   No new somatic complaints.  Back pain is not new or different.    PHYSICAL EXAM:  Bilateral breast exam benign with postoperative changes in the left breast and axillary nodes benign.    Vitals:    02/14/23 1105   BP: 131/91   Pulse: 98   Resp: 18   Temp: 97.3 °F  "(36.3 °C)   SpO2: 98%   Weight: 71.2 kg (157 lb)   Height: 170.2 cm (67\")     Vitals:    02/14/23 1105   PainSc:   8   PainLoc: Back  Comment: lumbar and cervical          ECOG score: 0           Vitals reviewed.    ECOG: (0) Fully Active - Able to Carry On All Pre-disease Performance Without Restriction    Lab Results   Component Value Date    HGB 14.3 01/25/2023    HCT 42.8 01/25/2023    MCV 84 01/25/2023     01/25/2023    WBC 6.3 01/25/2023    NEUTROABS 3.1 01/25/2023    LYMPHSABS 2.5 01/25/2023    MONOSABS 0.4 01/25/2023    EOSABS 0.2 01/25/2023    BASOSABS 0.1 01/25/2023       Lab Results   Component Value Date    GLUCOSE 102 (H) 01/25/2023    BUN 23 01/25/2023    CREATININE 0.80 01/25/2023     01/25/2023    K 4.5 01/25/2023     01/25/2023    CO2 20 01/25/2023    CALCIUM 9.9 01/25/2023    ALBUMIN 4.6 01/25/2023    BILITOT 0.4 01/25/2023    ALKPHOS 88 01/25/2023    AST 21 01/25/2023    ALT 15 01/25/2023             ASSESSMENT & PLAN:  1.  Left breast cancer: She had breast cancer biopsied 2/6/18 by Dr. marquez showing infiltrating ductal carcinoma [place] 6 out of 9, 60% 1+ HER-2/magaly, ER greater than 95% 2+, [place] 95% 2-3+.  On 3/13/18 she had lumpectomy for a 2.5 cm 0 out of one grade 2 infiltrating lobular carcinoma.  Mammaprint came back with a 10% distant risk of recurrence on hormonal blockade alone.  Because of the tumor over 2 cm in size, Dr. Tao recommended Taxotere and Cytoxan but after the second course of therapy she had a severe allergic reaction with hives.  She received 30 radiation treatments, completed 07/2018 and, given a history of DEANA without BSO for fibroids in 2012, Dr. Toya Carrillo due to her hormonal levels and found her to be premenopausal.  Hence she was started on tamoxifen for which she has been taking gabapentin for hot flashes.  Still having a lot of dermatographia is.  Has a hiatal hernia as well as a hearing loss since that time.  She came to me for " the first time 10/9/18 asking me to assume her care.  She was concerned regarding some hip pain for which she had been told in the past there was some tumor in the abdomen and seeing orthopedics for was told she did not need to do anything about it.  She also had hip injections for spinal stenosis with neurology and movable.  I asked her to get her prior bone imaging reports and discs and I'm getting a total body bone scan.  She had previous genetic testing August 2018 negative for BRCA and she is going to get those reports for rest of review what genetic test were done.  Assuming the bone scan is unremarkable and the outside imaging does not suggest any other worrisome bony abnormalities and I certainly think at least 5 years of tamoxifen is reasonable.  She will continue with the gabapentin.     Per records from Dr. Dr. Tao, she completed radiation to the left breast 7/30/18.  Because of shortness of breath with pleurisy, CT PE protocol 5/8/18 was done and no PE found.  ENT evaluation 5/17/18 with flexible laryngoscopy showed widely patent airway and prednisone was tapered post hypersensitivity reaction to the Taxotere.  Per note from . Dr. Tao of 8/27/18, BRCA1 and BRCA2 mutation analysis was ordered.  This was done with [employer] which showed no clinically significant mutation but there was a variant of uncertain significance found.  I recommended repeating this with cancernext panel for more thorough evaluation.  According to a note from her primary care in 2015 there was mention of an MRI showing no change in a thigh lipoma which I suspect is the mass she is referring to.  I performed total body bone scan 10/23/18 which was entirely negative.  She does have a large mass in the superior lateral aspect of her left thigh which has not grown and likely lipomatous but given the size of this, I am referring her to Bob Parham for evaluation.  She brought her discs with her on her visit to see me on  11/13/18 and she will take those to Dr. Parham and then bring them back to me for us to load into our system for the future.  In the meantime I will get her to our genetic counselors for further testing and to have her monitored over time in the event that her variant of undetermined significance becomes one that is determined to be significant.      -12/7/2018 [facility] 28 gene myRisk panel showed 2 variants of uncertain significance in BARD 1 and PMS 2    -2/4/2019 consultation Dr. Bob Parham showed left hip/thigh lipoma.  He recommended no surgery and just physical therapy for trochanteric bursitis.    -12/10/2020 follow-up with Dr. Serrano for fibromyalgia and osteoarthritis.    -7/28/2021 [facility] medical oncology APRN follow-up visit: No evidence of recurrent breast cancer on exam.  She is having some symptoms however that are concerning.  She has worsening bone pain particularly in her lower extremities, I will get a bone scan for further evaluation.  She also has intermittent abdominal pain and nausea.  She has a persistently high hemoglobin A1c despite cutting out sugar from her diet and being very carbohydrate restricted.  I will get a CT scan of her abdomen and pelvis.  I will call her with the results of both of these.  Assuming these are negative then we will plan on seeing her back for follow-up in 6 months.  She states that she may see an endocrinologist that one of her friends has recommended for her hyperglycemia.  Hopefully the Otezla will help.     She will continue tamoxifen 10 mg twice a day, she did not need a refill.  We will get her next annual mammogram in January and I have ordered that today.  Her liver enzymes were normal on recent blood work.  We will stop tamoxifen at 5 years which is July 2023.     Return to clinic in 6 months for follow-up, after her mammogram.  If she is doing well at that time we can then go to annual follow-ups as she sees Dr. Jennings in the summer  therefore we could see her in the winter.     -8/10/2021 CT abdomen pelvis with contrast showed no obstruction or other causes for abdominal pain.  Right 4 mm inferior pole nephrolithiasis.  Hepatic steatosis.  Total body bone scan done because of leg pain showed degenerative joint disease of the cervical and upper thoracic spine only minimally increased compared to January 2020 with stable low level degenerative joint disease elsewhere and no suspicious metastases.    -1/24/2022 right breast ultrasound and bilateral diagnostic mammogram [facility] BI-RADS 2 with recommended diagnostic mammogram in 12 months bilateral.      -2/1/2022 [facility] medical oncology follow-up telehealth visit: Though rapid test negative, she has had some URI symptoms that had her concerned so she did not want to come in and expose her cancer patient so we did a virtual visit.  Other than for her URI symptoms which are modest and free of fevers she has no other complaints.  She has lost a lot of weight on a keto plan.  We will continue tamoxifen through at least the summer 2023.  We will get mammogram again prior to return in a year and in the meantime I will get her breast cancer index per her request and if she has high benefit then she may be willing to put up with another couple of years of tamoxifen but if she continues to suffer from hot flashes, she is not sure if she will take extended adjuvant. Normal liver enzymes July 2021.  She will get gynecologic and physical breast exam with her gynecologist or primary care within the next 6 months.    .-2/14/2023 [facility] medical oncology follow-up: Her breast cancer index predicted 6.7% risk of late distant recurrence with some benefit from extended adjuvant.  With extended adjuvant can probably reduce that a couple of percent.  10/25/2022 CT abdomen pelvis without contrast done at [facility] showed nonobstructing left lower pole 2 mm calculus otherwise unremarkable.   1/25/2023 mammogram [facility] BI-RADS 2 with repeat screening in 12 months.  Consideration for MRI given history of breast cancer prior to age 49.  Degenerative disks on lumbar spine MRI without contrast September 2022 done by primary care.  1/25/2023 CMP unremarkable including normal liver enzymes and CBC normal.  For the above BCI she does not likely want to treat with hormone blockade past the summer but does want to get the MRI of her breast which I will order for the summertime and she will see my nurse practitioner back after that.  Bilateral breast exam benign today.    Total time of care today inclusive of time spent today prior to her arrival reviewing interval images and reports thereof and the BCI and recounting that information to her and going over the plan as outlined in after visit instituting this plan took 47 minutes of patient care time throughout the day today.  Derrick Terry MD    02/14/2023

## 2023-03-17 RX ORDER — SEMAGLUTIDE 1.34 MG/ML
INJECTION, SOLUTION SUBCUTANEOUS
Qty: 9 ML | Refills: 1 | Status: SHIPPED | OUTPATIENT
Start: 2023-03-17

## 2023-03-29 ENCOUNTER — LAB (OUTPATIENT)
Dept: FAMILY MEDICINE CLINIC | Facility: CLINIC | Age: 53
End: 2023-03-29
Payer: COMMERCIAL

## 2023-03-29 DIAGNOSIS — M54.50 ACUTE LOW BACK PAIN WITHOUT SCIATICA, UNSPECIFIED BACK PAIN LATERALITY: ICD-10-CM

## 2023-03-29 NOTE — TELEPHONE ENCOUNTER
Pt has an appt. Scheduled for 4/28 and needs refill on tramadol and celebrex until she comes in for her appt.

## 2023-04-02 RX ORDER — CELECOXIB 200 MG/1
200 CAPSULE ORAL DAILY
Qty: 90 CAPSULE | Refills: 0 | Status: SHIPPED | OUTPATIENT
Start: 2023-04-02

## 2023-04-02 RX ORDER — TRAMADOL HYDROCHLORIDE 50 MG/1
50 TABLET ORAL 3 TIMES DAILY
Qty: 270 TABLET | Refills: 0 | Status: SHIPPED | OUTPATIENT
Start: 2023-04-02

## 2023-04-03 RX ORDER — CYCLOBENZAPRINE HCL 10 MG
TABLET ORAL
Qty: 180 TABLET | Refills: 1 | OUTPATIENT
Start: 2023-04-03

## 2023-04-03 RX ORDER — PANTOPRAZOLE SODIUM 40 MG/1
TABLET, DELAYED RELEASE ORAL
Qty: 90 TABLET | Refills: 0 | Status: SHIPPED | OUTPATIENT
Start: 2023-04-03

## 2023-04-19 RX ORDER — CYCLOBENZAPRINE HCL 10 MG
TABLET ORAL
Qty: 180 TABLET | Refills: 1 | OUTPATIENT
Start: 2023-04-19

## 2023-04-25 ENCOUNTER — LAB (OUTPATIENT)
Dept: FAMILY MEDICINE CLINIC | Facility: CLINIC | Age: 53
End: 2023-04-25
Payer: COMMERCIAL

## 2023-04-25 DIAGNOSIS — E11.9 TYPE 2 DIABETES MELLITUS WITHOUT COMPLICATION, WITHOUT LONG-TERM CURRENT USE OF INSULIN: ICD-10-CM

## 2023-04-25 DIAGNOSIS — E03.9 ACQUIRED HYPOTHYROIDISM: ICD-10-CM

## 2023-04-25 DIAGNOSIS — E11.9 TYPE 2 DIABETES MELLITUS WITHOUT COMPLICATION, WITHOUT LONG-TERM CURRENT USE OF INSULIN: Primary | ICD-10-CM

## 2023-04-26 LAB
ALBUMIN SERPL-MCNC: 4.5 G/DL (ref 3.8–4.9)
ALBUMIN/GLOB SERPL: 1.9 {RATIO} (ref 1.2–2.2)
ALP SERPL-CCNC: 119 IU/L (ref 44–121)
ALT SERPL-CCNC: 17 IU/L (ref 0–32)
AST SERPL-CCNC: 16 IU/L (ref 0–40)
BASOPHILS # BLD AUTO: 0.1 X10E3/UL (ref 0–0.2)
BASOPHILS NFR BLD AUTO: 1 %
BILIRUB SERPL-MCNC: 0.3 MG/DL (ref 0–1.2)
BUN SERPL-MCNC: 23 MG/DL (ref 6–24)
BUN/CREAT SERPL: 33 (ref 9–23)
CALCIUM SERPL-MCNC: 10.1 MG/DL (ref 8.7–10.2)
CHLORIDE SERPL-SCNC: 99 MMOL/L (ref 96–106)
CHOLEST SERPL-MCNC: 228 MG/DL (ref 100–199)
CO2 SERPL-SCNC: 15 MMOL/L (ref 20–29)
CREAT SERPL-MCNC: 0.7 MG/DL (ref 0.57–1)
EGFRCR SERPLBLD CKD-EPI 2021: 103 ML/MIN/1.73
EOSINOPHIL # BLD AUTO: 0.3 X10E3/UL (ref 0–0.4)
EOSINOPHIL NFR BLD AUTO: 3 %
ERYTHROCYTE [DISTWIDTH] IN BLOOD BY AUTOMATED COUNT: 13 % (ref 11.7–15.4)
GLOBULIN SER CALC-MCNC: 2.4 G/DL (ref 1.5–4.5)
GLUCOSE SERPL-MCNC: 207 MG/DL (ref 70–99)
HBA1C MFR BLD: 7.7 % (ref 4.8–5.6)
HCT VFR BLD AUTO: 45 % (ref 34–46.6)
HDLC SERPL-MCNC: 49 MG/DL
HGB BLD-MCNC: 14.7 G/DL (ref 11.1–15.9)
IMM GRANULOCYTES # BLD AUTO: 0.1 X10E3/UL (ref 0–0.1)
IMM GRANULOCYTES NFR BLD AUTO: 1 %
LDLC SERPL CALC-MCNC: 124 MG/DL (ref 0–99)
LYMPHOCYTES # BLD AUTO: 2.9 X10E3/UL (ref 0.7–3.1)
LYMPHOCYTES NFR BLD AUTO: 36 %
MCH RBC QN AUTO: 27.4 PG (ref 26.6–33)
MCHC RBC AUTO-ENTMCNC: 32.7 G/DL (ref 31.5–35.7)
MCV RBC AUTO: 84 FL (ref 79–97)
MONOCYTES # BLD AUTO: 0.6 X10E3/UL (ref 0.1–0.9)
MONOCYTES NFR BLD AUTO: 7 %
NEUTROPHILS # BLD AUTO: 4.3 X10E3/UL (ref 1.4–7)
NEUTROPHILS NFR BLD AUTO: 52 %
PLATELET # BLD AUTO: 392 X10E3/UL (ref 150–450)
POTASSIUM SERPL-SCNC: 4.8 MMOL/L (ref 3.5–5.2)
PROT SERPL-MCNC: 6.9 G/DL (ref 6–8.5)
RBC # BLD AUTO: 5.36 X10E6/UL (ref 3.77–5.28)
SODIUM SERPL-SCNC: 137 MMOL/L (ref 134–144)
TRIGL SERPL-MCNC: 311 MG/DL (ref 0–149)
TSH SERPL DL<=0.005 MIU/L-ACNC: 3.62 UIU/ML (ref 0.45–4.5)
VLDLC SERPL CALC-MCNC: 55 MG/DL (ref 5–40)
WBC # BLD AUTO: 8.2 X10E3/UL (ref 3.4–10.8)

## 2023-04-28 ENCOUNTER — TELEMEDICINE (OUTPATIENT)
Dept: FAMILY MEDICINE CLINIC | Facility: CLINIC | Age: 53
End: 2023-04-28
Payer: COMMERCIAL

## 2023-04-28 DIAGNOSIS — R25.1 TREMOR: ICD-10-CM

## 2023-04-28 DIAGNOSIS — G89.29 CHRONIC LOW BACK PAIN WITHOUT SCIATICA, UNSPECIFIED BACK PAIN LATERALITY: ICD-10-CM

## 2023-04-28 DIAGNOSIS — E78.2 MIXED HYPERLIPIDEMIA: ICD-10-CM

## 2023-04-28 DIAGNOSIS — E11.9 TYPE 2 DIABETES MELLITUS WITHOUT COMPLICATION, WITHOUT LONG-TERM CURRENT USE OF INSULIN: Primary | ICD-10-CM

## 2023-04-28 DIAGNOSIS — M54.50 CHRONIC LOW BACK PAIN WITHOUT SCIATICA, UNSPECIFIED BACK PAIN LATERALITY: ICD-10-CM

## 2023-04-28 DIAGNOSIS — K21.9 GASTROESOPHAGEAL REFLUX DISEASE WITHOUT ESOPHAGITIS: ICD-10-CM

## 2023-04-28 RX ORDER — CELECOXIB 200 MG/1
200 CAPSULE ORAL DAILY
Qty: 90 CAPSULE | Refills: 1 | Status: SHIPPED | OUTPATIENT
Start: 2023-04-28

## 2023-04-28 RX ORDER — PANTOPRAZOLE SODIUM 40 MG/1
40 TABLET, DELAYED RELEASE ORAL DAILY
Qty: 90 TABLET | Refills: 0 | Status: SHIPPED | OUTPATIENT
Start: 2023-04-28

## 2023-04-28 RX ORDER — PRIMIDONE 50 MG/1
150 TABLET ORAL NIGHTLY
Qty: 90 TABLET | Refills: 5 | Status: SHIPPED | OUTPATIENT
Start: 2023-04-28

## 2023-04-29 NOTE — PROGRESS NOTES
Mode of Visit: Video  Location of patient: home   Location of provider: Office  You have chosen to receive care through a telehealth visit.  The patient has signed the video visit consent form.  The visit included audio and video interaction. No technical issues occurred during this visit.     Chief Complaint  Diabetes (Go over labs)      Porsha Bolden presents to [facility]    Today we are reviewing her labs related to her diabetes.  There has been an increase in her A1c since our last visit.  She has had to take some steroids due to epidural steroid injections at pain management.  We also needed to refill medications for her chronic low back pain.  Condition relatively stable.  We also refilled medications for her GERD.  Tremor somewhat worse.  Labs reveal hyperlipidemia as well.    Review of Systems   Constitutional: Negative for fatigue and fever.   HENT: Negative for congestion and ear pain.    Respiratory: Negative for apnea, cough, chest tightness and shortness of breath.    Cardiovascular: Negative for chest pain.   Gastrointestinal: Negative for abdominal pain, constipation, diarrhea and nausea.   Musculoskeletal: Negative for arthralgias.   Psychiatric/Behavioral: Negative for depressed mood and stress.       Objective   Vital Signs:   There were no vitals taken for this visit.    Physical Exam   Constitutional: She appears well-developed and well-nourished.   Psychiatric: She has a normal mood and affect.       BMI is within normal parameters. No other follow-up for BMI required.            Assessment and Plan    Diagnoses and all orders for this visit:    1. Type 2 diabetes mellitus without complication, without long-term current use of insulin (Primary)    2. Tremor    3. Gastroesophageal reflux disease without esophagitis    4. Chronic low back pain without sciatica, unspecified back pain laterality    5. Mixed hyperlipidemia    Other orders  -      pantoprazole (PROTONIX) 40 MG EC tablet; Take 1 tablet by mouth Daily.  Dispense: 90 tablet; Refill: 0  -     celecoxib (CeleBREX) 200 MG capsule; Take 1 capsule by mouth Daily.  Dispense: 90 capsule; Refill: 1  -     Semaglutide, 2 MG/DOSE, (OZEMPIC) 8 MG/3ML solution pen-injector; Inject 2 mg under the skin into the appropriate area as directed 1 (One) Time Per Week.  Dispense: 9 mL; Refill: 1  -     primidone (MYSOLINE) 50 MG tablet; Take 3 tablets by mouth Every Night. Please change the quant  Dispense: 90 tablet; Refill: 5        Increase Ozempic because of the elevated sugars to 2 mg.  This may also help with her lipids indirectly with decreased appetite.  Increase primidone for tremor.  Continue current medications for reflux and osteoarthritis.  Watch diet for her lipids.    Follow Up   No follow-ups on file.  Patient was given instructions and counseling regarding her condition or for health maintenance advice. Please see specific information pulled into the AVS if appropriate.

## 2023-06-05 DIAGNOSIS — C50.412 MALIGNANT NEOPLASM OF UPPER-OUTER QUADRANT OF LEFT BREAST IN FEMALE, ESTROGEN RECEPTOR POSITIVE: ICD-10-CM

## 2023-06-05 DIAGNOSIS — Z17.0 MALIGNANT NEOPLASM OF UPPER-OUTER QUADRANT OF LEFT BREAST IN FEMALE, ESTROGEN RECEPTOR POSITIVE: ICD-10-CM

## 2023-06-05 RX ORDER — TAMOXIFEN CITRATE 10 MG/1
TABLET ORAL
Refills: 3 | OUTPATIENT
Start: 2023-06-05

## 2023-07-26 ENCOUNTER — OFFICE VISIT (OUTPATIENT)
Dept: ONCOLOGY | Facility: CLINIC | Age: 53
End: 2023-07-26
Payer: COMMERCIAL

## 2023-07-26 VITALS
HEIGHT: 67 IN | HEART RATE: 98 BPM | WEIGHT: 160 LBS | DIASTOLIC BLOOD PRESSURE: 70 MMHG | TEMPERATURE: 97.5 F | OXYGEN SATURATION: 98 % | RESPIRATION RATE: 18 BRPM | SYSTOLIC BLOOD PRESSURE: 112 MMHG | BODY MASS INDEX: 25.11 KG/M2

## 2023-07-26 DIAGNOSIS — C50.412 MALIGNANT NEOPLASM OF UPPER-OUTER QUADRANT OF LEFT BREAST IN FEMALE, ESTROGEN RECEPTOR POSITIVE: Primary | ICD-10-CM

## 2023-07-26 DIAGNOSIS — Z17.0 MALIGNANT NEOPLASM OF UPPER-OUTER QUADRANT OF LEFT BREAST IN FEMALE, ESTROGEN RECEPTOR POSITIVE: Primary | ICD-10-CM

## 2023-07-26 DIAGNOSIS — Z12.31 SCREENING MAMMOGRAM, ENCOUNTER FOR: ICD-10-CM

## 2023-07-26 PROCEDURE — 99213 OFFICE O/P EST LOW 20 MIN: CPT | Performed by: NURSE PRACTITIONER

## 2023-07-26 NOTE — PROGRESS NOTES
CHIEF COMPLAINT: Hot flashes    Problem List:  Oncology/Hematology History Overview Note   1.  Left breast cancer: She had breast cancer biopsied 2/6/18 by Dr. marquez showing infiltrating ductal carcinoma [place] 6 out of 9, 60% 1+ HER-2/magaly, ER greater than 95% 2+, [place] 95% 2-3+.  On 3/13/18 she had lumpectomy for a 2.5 cm 0 out of one grade 2 infiltrating lobular carcinoma.  Mammaprint came back with a 10% distant risk of recurrence on hormonal blockade alone.  Because of the tumor over 2 cm in size, Dr. Tao recommended Taxotere and Cytoxan but after the second course of therapy she had a severe allergic reaction with hives.  She received 30 radiation treatments, completed 07/2018 and, given a history of DEANA without BSO for fibroids in 2012, Dr. Toya Carrillo due to her hormonal levels and found her to be premenopausal.  Hence she was started on tamoxifen for which she has been taking gabapentin for hot flashes.  Still having a lot of dermatographia is.  Has a hiatal hernia as well as a hearing loss since that time.  She came to me for the first time 10/9/18 asking me to assume her care.  She was concerned regarding some hip pain for which she had been told in the past there was some tumor in the abdomen and seeing orthopedics for was told she did not need to do anything about it.  She also had hip injections for spinal stenosis with neurology and movable.  I asked her to get her prior bone imaging reports and discs and I'm getting a total body bone scan.  She had previous genetic testing August 2018 negative for BRCA and she is going to get those reports for rest of review what genetic test were done.  Assuming the bone scan is unremarkable and the outside imaging does not suggest any other worrisome bony abnormalities and I certainly think at least 5 years of tamoxifen is reasonable.  She will continue with the gabapentin.     Per records from Dr. Dr. Tao, she completed radiation to the left  breast 7/30/18.  Because of shortness of breath with pleurisy, CT PE protocol 5/8/18 was done and no PE found.  ENT evaluation 5/17/18 with flexible laryngoscopy showed widely patent airway and prednisone was tapered post hypersensitivity reaction to the Taxotere.  Per note from . Dr. Tao of 8/27/18, BRCA1 and BRCA2 mutation analysis was ordered.  This was done with [employer] which showed no clinically significant mutation but there was a variant of uncertain significance found.  I recommended repeating this with cancernext panel for more thorough evaluation.  According to a note from her primary care in 2015 there was mention of an MRI showing no change in a thigh lipoma which I suspect is the mass she is referring to.  I performed total body bone scan 10/23/18 which was entirely negative.  She does have a large mass in the superior lateral aspect of her left thigh which has not grown and likely lipomatous but given the size of this, I am referring her to Bob Parham for evaluation.  She brought her discs with her on her visit to see me on 11/13/18 and she will take those to Dr. Parham and then bring them back to me for us to load into our system for the future.  In the meantime I will get her to our genetic counselors for further testing and to have her monitored over time in the event that her variant of undetermined significance becomes one that is determined to be significant.      -12/7/2018 [employer] 28 gene myRisk panel showed 2 variants of uncertain significance in BARD 1 and PMS 2    -2/4/2019 consultation Dr. Bob Parham showed left hip/thigh lipoma.  He recommended no surgery and just physical therapy for trochanteric bursitis.    -12/10/2020 follow-up with Dr. Serrano for fibromyalgia and osteoarthritis.    -7/28/2021 [facility] APRN follow-up visit: No evidence of recurrent breast cancer on exam.  She is having some symptoms however that are concerning.  She has  worsening bone pain particularly in her lower extremities, I will get a bone scan for further evaluation.  She also has intermittent abdominal pain and nausea.  She has a persistently high hemoglobin A1c despite cutting out sugar from her diet and being very carbohydrate restricted.  I will get a CT scan of her abdomen and pelvis.  I will call her with the results of both of these.  Assuming these are negative then we will plan on seeing her back for follow-up in 6 months.  She states that she may see an endocrinologist that one of her friends has recommended for her hyperglycemia.  Hopefully the Otezla will help.     She will continue tamoxifen 10 mg twice a day, she did not need a refill.  We will get her next annual mammogram in January and I have ordered that today.  Her liver enzymes were normal on recent blood work.  We will stop tamoxifen at 5 years which is July 2023.     Return to clinic in 6 months for follow-up, after her mammogram.  If she is doing well at that time we can then go to annual follow-ups as she sees Dr. Jennings in the summer therefore we could see her in the winter.     -8/10/2021 CT abdomen pelvis with contrast showed no obstruction or other causes for abdominal pain.  Right 4 mm inferior pole nephrolithiasis.  Hepatic steatosis.  Total body bone scan done because of leg pain showed degenerative joint disease of the cervical and upper thoracic spine only minimally increased compared to January 2020 with stable low level degenerative joint disease elsewhere and no suspicious metastases.    -1/24/2022 right breast ultrasound and bilateral diagnostic mammogram [place] [facility] BI-RADS 2 with recommended diagnostic mammogram in 12 months bilateral.      -2/1/2022 [facility] follow-up telehealth visit: Though rapid test negative, she has had some URI symptoms that had her concerned so she did not want to come in and expose her cancer patient so we did a virtual visit.  Other  than for her URI symptoms which are modest and free of fevers she has no other complaints.  She has lost a lot of weight on a keto plan.  We will continue tamoxifen through at least the summer 2023.  We will get mammogram again prior to return in a year and in the meantime I will get her breast cancer index per her request and if she has high benefit then she may be willing to put up with another couple of years of tamoxifen but if she continues to suffer from hot flashes, she is not sure if she will take extended adjuvant. Normal liver enzymes July 2021.  She will get gynecologic and physical breast exam with her gynecologist or primary care within the next 6 months.    .-2/14/2023 [facility] medical oncology follow-up: Her breast cancer index predicted 6.7% risk of late distant recurrence with some benefit from extended adjuvant.  With extended adjuvant can probably reduce that a couple of percent.  10/25/2022 CT abdomen pelvis without contrast done at [facility] showed nonobstructing left lower pole 2 mm calculus otherwise unremarkable.  1/25/2023 mammogram [facility] BI-RADS 2 with repeat screening in 12 months.  Consideration for MRI given history of breast cancer prior to age 49.  Degenerative disks on lumbar spine MRI without contrast September 2022 done by primary care.  1/25/2023 CMP unremarkable including normal liver enzymes and CBC normal.  For the above BCI she does not likely want to treat with hormone blockade past the summer but does want to get the MRI of her breast which I will order for the summertime and she will see my nurse practitioner back after that.  Bilateral breast exam benign today.     Malignant neoplasm of upper-outer quadrant of left breast in female, estrogen receptor positive   2/6/2018 Initial Diagnosis    Malignant neoplasm of upper-outer quadrant of breast in female, estrogen receptor positive (CMS/HCC)     7/2/2018 -  Hormonal Therapy    Tamoxifen began after  radiation     10/23/2018 Imaging    Total body bone scan     12/7/2018 Genetic Testing    Genetic testing Results were obtained from [facility] confirming that a 28 gene [facility] panel had been performed, which included all high and moderate risk breast cancer genes as well as genes related to other cancer risks.  Two variants of uncertain significance (VUS) were identified, in the BARD1 and PMS2 genes.  The ClinVar database was referenced, which did not show any alternative interpretations of either VUS.          1/14/2020 Imaging    1/14/2020 mammogram BI-RADS VI with recommended unilateral follow-up left mammogram in July.     1/24/2020 Imaging     bone scan stable with no progressive abnormalities to explain left rib pain     12/10/2020 Imaging    DEXA bone density testing normal.     1/21/2021 Imaging    -1/21/2021 bilateral diagnostic mammogram BI-RADS 2.     Lipoma of left lower extremity   3/23/2015 Initial Diagnosis    Mass of left thigh     3/23/2015 Imaging    MRI left lower extremity Impression:  Abnormal left tensor fascial des muscle of uncertain significance.  That signal may represent a lipoma but is more infiltrative and unusual.  Other neoplastic process including liposarcoma cannot be excluded.  Mild contrast enhancement and air present muscle injury, muscle inflammation or less likely, neoplastic involvement.     1/17/2019 Imaging    MRI right gluteal region showed fatty tumor posteriorly in the fascia varun     2/4/2019 -  Other Event    Consultation with Dr. Bob Parham at the [facility] for left thigh lipoma,  recommended physical therapy for her snapping hip and trocar bursitis, in regard to the tumor no plans on surgically resecting.  Follow-up PRN.         HISTORY OF PRESENT ILLNESS:  The patient is a 53 y.o. female, here for follow up on management of history of ER+ breast cancer now completing 5 years of adjuvant therapy with tamoxifen. Porsha has been doing well  "since we saw her last with no new concerns.  She had screening MRI of the breasts on 7/19/2023, results are pending.  She is looking forward to coming off of tamoxifen as it is caused her a lot of side effects with hot flashes that continue.  She continues to deal with chronic back and neck pain.    Past Medical History:   Diagnosis Date    Breast cancer 2018    Diabetes mellitus     Disease of thyroid gland      Past Surgical History:   Procedure Laterality Date    BREAST LUMPECTOMY Left 02/2018    HYSTERECTOMY      fibroid    KNEE ARTHROSCOPY  2004    KNEE SURGERY      x4    REFRACTIVE SURGERY      Lasik       Allergies   Allergen Reactions    Codeine GI Intolerance    Morphine GI Intolerance    Hydrocodone-Acetaminophen Itching    Cytoxan [Cyclophosphamide] Hives    Fluticasone Other (See Comments)    Eliana Other (See Comments)    Taxotere [Docetaxel] Hives    Vilanterol Other (See Comments)    Zyrtec [Cetirizine] Rash       Family History and Social History reviewed and changed as necessary    REVIEW OF SYSTEM:   Chronic neck and back pain  Hot flashes    PHYSICAL EXAM:  Well-developed, well-nourished healthy-appearing female in no distress  Respirations regular nonlabored    Vitals:    07/26/23 1323   BP: 112/70   Pulse: 98   Resp: 18   Temp: 97.5 °F (36.4 °C)   SpO2: 98%   Weight: 72.6 kg (160 lb)   Height: 170.2 cm (67\")     Vitals:    07/26/23 1323   PainSc:   8   PainLoc: Back          ECOG score: 0           Vitals reviewed.  Labs available in epic reviewed at time of visit    ECOG: (0) Fully Active - Able to Carry On All Pre-disease Performance Without Restriction    Lab Results   Component Value Date    HGB 14.7 04/25/2023    HCT 45.0 04/25/2023    MCV 84 04/25/2023     04/25/2023    WBC 8.2 04/25/2023    NEUTROABS 4.3 04/25/2023    LYMPHSABS 2.9 04/25/2023    MONOSABS 0.6 04/25/2023    EOSABS 0.3 04/25/2023    BASOSABS 0.1 04/25/2023       Lab Results   Component Value Date    GLUCOSE 207 (H) " 04/25/2023    BUN 23 04/25/2023    CREATININE 0.60 07/19/2023     04/25/2023    K 4.8 04/25/2023    CL 99 04/25/2023    CO2 15 (L) 04/25/2023    CALCIUM 10.1 04/25/2023    ALBUMIN 4.5 04/25/2023    BILITOT 0.3 04/25/2023    ALKPHOS 119 04/25/2023    AST 16 04/25/2023    ALT 17 04/25/2023             ASSESSMENT & PLAN:  1.  Left breast cancer: She had breast cancer biopsied 2/6/18 by Dr. marquez showing infiltrating ductal carcinoma [place] [address] 6 out of 9, 60% 1+ HER-2/magaly, ER greater than 95% 2+, [place] 95% 2-3+.  On 3/13/18 she had lumpectomy for a 2.5 cm 0 out of one grade 2 infiltrating lobular carcinoma.  Mammaprint came back with a 10% distant risk of recurrence on hormonal blockade alone.  Because of the tumor over 2 cm in size, Dr. Tao recommended Taxotere and Cytoxan but after the second course of therapy she had a severe allergic reaction with hives.  She received 30 radiation treatments, completed 07/2018 and, given a history of DEANA without BSO for fibroids in 2012, Dr. Toya Carrillo due to her hormonal levels and found her to be premenopausal.  Hence she was started on tamoxifen for which she has been taking gabapentin for hot flashes.  Still having a lot of dermatographia is.  Has a hiatal hernia as well as a hearing loss since that time.  She came to me for the first time 10/9/18 asking me to assume her care.  She was concerned regarding some hip pain for which she had been told in the past there was some tumor in the abdomen and seeing orthopedics for was told she did not need to do anything about it.  She also had hip injections for spinal stenosis with neurology and movable.  I asked her to get her prior bone imaging reports and discs and I'm getting a total body bone scan.  She had previous genetic testing August 2018 negative for BRCA and she is going to get those reports for rest of review what genetic test were done.  Assuming the bone scan is unremarkable and the outside imaging does  not suggest any other worrisome bony abnormalities and I certainly think at least 5 years of tamoxifen is reasonable.  She will continue with the gabapentin.     Per records from Dr. Dr. Tao, she completed radiation to the left breast 7/30/18.  Because of shortness of breath with pleurisy, CT PE protocol 5/8/18 was done and no PE found.  ENT evaluation 5/17/18 with flexible laryngoscopy showed widely patent airway and prednisone was tapered post hypersensitivity reaction to the Taxotere.  Per note from . Dr. Tao of 8/27/18, BRCA1 and BRCA2 mutation analysis was ordered.  This was done with [employer] which showed no clinically significant mutation but there was a variant of uncertain significance found.  I recommended repeating this with cancernext panel for more thorough evaluation.  According to a note from her primary care in 2015 there was mention of an MRI showing no change in a thigh lipoma which I suspect is the mass she is referring to.  I performed total body bone scan 10/23/18 which was entirely negative.  She does have a large mass in the superior lateral aspect of her left thigh which has not grown and likely lipomatous but given the size of this, I am referring her to Bob Parham for evaluation.  She brought her discs with her on her visit to see me on 11/13/18 and she will take those to Dr. Parham and then bring them back to me for us to load into our system for the future.  In the meantime I will get her to our genetic counselors for further testing and to have her monitored over time in the event that her variant of undetermined significance becomes one that is determined to be significant.      -12/7/2018 [employer] 28 gene myRisk panel showed 2 variants of uncertain significance in BARD 1 and PMS 2    -2/4/2019 consultation Dr. Bob Parham showed left hip/thigh lipoma.  He recommended no surgery and just physical therapy for trochanteric bursitis.    -12/10/2020  follow-up with Dr. Serrano for fibromyalgia and osteoarthritis.    -7/28/2021 [facility] medical oncology APRN follow-up visit: No evidence of recurrent breast cancer on exam.  She is having some symptoms however that are concerning.  She has worsening bone pain particularly in her lower extremities, I will get a bone scan for further evaluation.  She also has intermittent abdominal pain and nausea.  She has a persistently high hemoglobin A1c despite cutting out sugar from her diet and being very carbohydrate restricted.  I will get a CT scan of her abdomen and pelvis.  I will call her with the results of both of these.  Assuming these are negative then we will plan on seeing her back for follow-up in 6 months.  She states that she may see an endocrinologist that one of her friends has recommended for her hyperglycemia.  Hopefully the Otezla will help.     She will continue tamoxifen 10 mg twice a day, she did not need a refill.  We will get her next annual mammogram in January and I have ordered that today.  Her liver enzymes were normal on recent blood work.  We will stop tamoxifen at 5 years which is July 2023.     Return to clinic in 6 months for follow-up, after her mammogram.  If she is doing well at that time we can then go to annual follow-ups as she sees Dr. Jennings in the summer therefore we could see her in the winter.     -8/10/2021 CT abdomen pelvis with contrast showed no obstruction or other causes for abdominal pain.  Right 4 mm inferior pole nephrolithiasis.  Hepatic steatosis.  Total body bone scan done because of leg pain showed degenerative joint disease of the cervical and upper thoracic spine only minimally increased compared to January 2020 with stable low level degenerative joint disease elsewhere and no suspicious metastases.    -1/24/2022 right breast ultrasound and bilateral diagnostic mammogram [place] [facility] BI-RADS 2 with recommended diagnostic mammogram in 12 months  bilateral.      -2/1/2022 [facility] follow-up telehealth visit: Though rapid test negative, she has had some URI symptoms that had her concerned so she did not want to come in and expose her cancer patient so we did a virtual visit.  Other than for her URI symptoms which are modest and free of fevers she has no other complaints.  She has lost a lot of weight on a keto plan.  We will continue tamoxifen through at least the summer 2023.  We will get mammogram again prior to return in a year and in the meantime I will get her breast cancer index per her request and if she has high benefit then she may be willing to put up with another couple of years of tamoxifen but if she continues to suffer from hot flashes, she is not sure if she will take extended adjuvant. Normal liver enzymes July 2021.  She will get gynecologic and physical breast exam with her gynecologist or primary care within the next 6 months.    .-2/14/2023 [facility] follow-up: Her breast cancer index predicted 6.7% risk of late distant recurrence with some benefit from extended adjuvant.  With extended adjuvant can probably reduce that a couple of percent.  10/25/2022 CT abdomen pelvis without contrast done at [facility] showed nonobstructing left lower pole 2 mm calculus otherwise unremarkable.  1/25/2023 mammogram [facility] BI-RADS 2 with repeat screening in 12 months.  Consideration for MRI given history of breast cancer prior to age 49.  Degenerative disks on lumbar spine MRI without contrast September 2022 done by primary care.  1/25/2023 CMP unremarkable including normal liver enzymes and CBC normal.  For the above BCI she does not likely want to treat with hormone blockade past the summer but does want to get the MRI of her breast which I will order for the summertime and she will see my nurse practitioner back after that.  Bilateral breast exam benign today.    -7/26/2023 [facility]  follow-up: Porsha is now completing 5 years adjuvant therapy with tamoxifen and has stopped taking it.  She has opted out of extended adjuvant therapy.  She continues to deal with hot flashes.  We did refill her clonidine, hopefully after she has been off of tamoxifen for a while longer her hot flashes will improve.  I did recommend today that she look into acupuncture as there is data showing this to be helpful.  I gave her information on the department of integrative medicine at [facility]. She recently had breast MRI screening on 7/19/2023, results are still pending.  She does her annual mammogram in [place] and that will be due in January and I have ordered that today.  I will see her back in 1 year for follow-up in survivorship mode.    This was a level 3, limited MDM visit with management of stable chronic illness, prescription drug management and ordering of mammogram.    Orly Masterson, APRN    07/26/2023

## 2023-08-07 ENCOUNTER — TELEPHONE (OUTPATIENT)
Dept: FAMILY MEDICINE CLINIC | Facility: CLINIC | Age: 53
End: 2023-08-07
Payer: COMMERCIAL

## 2023-08-07 DIAGNOSIS — E11.9 TYPE 2 DIABETES MELLITUS WITHOUT COMPLICATION, WITHOUT LONG-TERM CURRENT USE OF INSULIN: Primary | ICD-10-CM

## 2023-08-07 DIAGNOSIS — E78.2 MIXED HYPERLIPIDEMIA: ICD-10-CM

## 2023-08-07 DIAGNOSIS — E03.9 ACQUIRED HYPOTHYROIDISM: ICD-10-CM

## 2023-08-15 ENCOUNTER — LAB (OUTPATIENT)
Dept: FAMILY MEDICINE CLINIC | Facility: CLINIC | Age: 53
End: 2023-08-15
Payer: COMMERCIAL

## 2023-08-16 LAB
ALBUMIN SERPL-MCNC: 4.7 G/DL (ref 3.8–4.9)
ALBUMIN/GLOB SERPL: 2.4 {RATIO} (ref 1.2–2.2)
ALP SERPL-CCNC: 117 IU/L (ref 44–121)
ALT SERPL-CCNC: 21 IU/L (ref 0–32)
AST SERPL-CCNC: 22 IU/L (ref 0–40)
BASOPHILS # BLD AUTO: 0.1 X10E3/UL (ref 0–0.2)
BASOPHILS NFR BLD AUTO: 1 %
BILIRUB SERPL-MCNC: 0.2 MG/DL (ref 0–1.2)
BUN SERPL-MCNC: 15 MG/DL (ref 6–24)
BUN/CREAT SERPL: 20 (ref 9–23)
CALCIUM SERPL-MCNC: 10 MG/DL (ref 8.7–10.2)
CHLORIDE SERPL-SCNC: 100 MMOL/L (ref 96–106)
CHOLEST SERPL-MCNC: 209 MG/DL (ref 100–199)
CO2 SERPL-SCNC: 18 MMOL/L (ref 20–29)
CREAT SERPL-MCNC: 0.74 MG/DL (ref 0.57–1)
EGFRCR SERPLBLD CKD-EPI 2021: 96 ML/MIN/1.73
EOSINOPHIL # BLD AUTO: 0.2 X10E3/UL (ref 0–0.4)
EOSINOPHIL NFR BLD AUTO: 3 %
ERYTHROCYTE [DISTWIDTH] IN BLOOD BY AUTOMATED COUNT: 13.5 % (ref 11.7–15.4)
GLOBULIN SER CALC-MCNC: 2 G/DL (ref 1.5–4.5)
GLUCOSE SERPL-MCNC: 157 MG/DL (ref 70–99)
HBA1C MFR BLD: 7.3 % (ref 4.8–5.6)
HCT VFR BLD AUTO: 40 % (ref 34–46.6)
HDLC SERPL-MCNC: 55 MG/DL
HGB BLD-MCNC: 13.5 G/DL (ref 11.1–15.9)
IMM GRANULOCYTES # BLD AUTO: 0.1 X10E3/UL (ref 0–0.1)
IMM GRANULOCYTES NFR BLD AUTO: 1 %
LDLC SERPL CALC-MCNC: 128 MG/DL (ref 0–99)
LYMPHOCYTES # BLD AUTO: 1.7 X10E3/UL (ref 0.7–3.1)
LYMPHOCYTES NFR BLD AUTO: 22 %
MCH RBC QN AUTO: 27.8 PG (ref 26.6–33)
MCHC RBC AUTO-ENTMCNC: 33.8 G/DL (ref 31.5–35.7)
MCV RBC AUTO: 83 FL (ref 79–97)
MONOCYTES # BLD AUTO: 0.4 X10E3/UL (ref 0.1–0.9)
MONOCYTES NFR BLD AUTO: 5 %
NEUTROPHILS # BLD AUTO: 5.3 X10E3/UL (ref 1.4–7)
NEUTROPHILS NFR BLD AUTO: 68 %
PLATELET # BLD AUTO: 305 X10E3/UL (ref 150–450)
POTASSIUM SERPL-SCNC: 4.3 MMOL/L (ref 3.5–5.2)
PROT SERPL-MCNC: 6.7 G/DL (ref 6–8.5)
RBC # BLD AUTO: 4.85 X10E6/UL (ref 3.77–5.28)
SODIUM SERPL-SCNC: 137 MMOL/L (ref 134–144)
TRIGL SERPL-MCNC: 146 MG/DL (ref 0–149)
TSH SERPL DL<=0.005 MIU/L-ACNC: 1.16 UIU/ML (ref 0.45–4.5)
VLDLC SERPL CALC-MCNC: 26 MG/DL (ref 5–40)
WBC # BLD AUTO: 7.7 X10E3/UL (ref 3.4–10.8)

## 2023-08-25 ENCOUNTER — OFFICE VISIT (OUTPATIENT)
Dept: FAMILY MEDICINE CLINIC | Facility: CLINIC | Age: 53
End: 2023-08-25
Payer: COMMERCIAL

## 2023-08-25 VITALS
BODY MASS INDEX: 26.63 KG/M2 | SYSTOLIC BLOOD PRESSURE: 118 MMHG | OXYGEN SATURATION: 98 % | WEIGHT: 170 LBS | DIASTOLIC BLOOD PRESSURE: 76 MMHG | HEART RATE: 92 BPM

## 2023-08-25 DIAGNOSIS — G89.29 CHRONIC LOW BACK PAIN WITHOUT SCIATICA, UNSPECIFIED BACK PAIN LATERALITY: ICD-10-CM

## 2023-08-25 DIAGNOSIS — G43.809 OTHER MIGRAINE WITHOUT STATUS MIGRAINOSUS, NOT INTRACTABLE: ICD-10-CM

## 2023-08-25 DIAGNOSIS — M54.50 CHRONIC LOW BACK PAIN WITHOUT SCIATICA, UNSPECIFIED BACK PAIN LATERALITY: ICD-10-CM

## 2023-08-25 DIAGNOSIS — G47.39 SLEEP APNEA-LIKE BEHAVIOR: Primary | ICD-10-CM

## 2023-08-25 DIAGNOSIS — R42 VERTIGO: ICD-10-CM

## 2023-08-25 PROCEDURE — 99214 OFFICE O/P EST MOD 30 MIN: CPT | Performed by: FAMILY MEDICINE

## 2023-08-25 RX ORDER — NABUMETONE 500 MG/1
500 TABLET, FILM COATED ORAL 2 TIMES DAILY PRN
Qty: 60 TABLET | Refills: 2 | Status: SHIPPED | OUTPATIENT
Start: 2023-08-25

## 2023-08-25 NOTE — PROGRESS NOTES
Follow Up Office Visit      Date of Visit:  2023   Patient Name: Porsha Bolden  : 1970   MRN: [id number]     Chief Complaint:    Chief Complaint   Patient presents with    Diabetes       History of Present Illness: Porsha Bolden is a 53 y.o. female who is here today for follow up.  Patient following up through multiple issues.  Patient with diabetes.  Reviewed recent blood work and condition stable.  Medications reviewed and she currently does not need refills.  Currently having problems with sleep apnea-like behavior.  Some snoring noted.  Daytime fatigue noted.  Patient also with some vertigo sensation.        Subjective      Review of Systems:   Review of Systems   Constitutional:  Negative for fatigue and fever.   HENT:  Negative for congestion and ear pain.    Respiratory:  Negative for apnea, cough, chest tightness and shortness of breath.    Cardiovascular:  Negative for chest pain.   Gastrointestinal:  Negative for abdominal pain, constipation, diarrhea and nausea.   Musculoskeletal:  Negative for arthralgias.   Psychiatric/Behavioral:  Negative for depressed mood and stress.      Past Medical History:   Past Medical History:   Diagnosis Date    Allergic     Arthritis     Breast cancer 2018    Diabetes mellitus     Disease of thyroid gland     Fibromyalgia, primary     Headache     HL (hearing loss)     Hypothyroidism     Low back pain     Scoliosis     Tremor     Visual impairment        Past Surgical History:   Past Surgical History:   Procedure Laterality Date    BREAST LUMPECTOMY Left 2018    COLONOSCOPY      EYE SURGERY      Lasix    FRACTURE SURGERY      HYSTERECTOMY      fibroid    KNEE ARTHROSCOPY  2004    KNEE SURGERY      x4    REFRACTIVE SURGERY      Lasik    SUBTOTAL HYSTERECTOMY         Family History:   Family History   Problem Relation Age of Onset    Uterine cancer Mother     Arthritis Mother     Cancer Mother     Hearing  loss Mother     Heart disease Mother     Hyperlipidemia Mother     Stroke Mother     Vision loss Mother     Lung cancer Father     Brain cancer Father 69    Cancer Father     Hearing loss Father     Breast cancer Sister     Arthritis Sister     Cancer Sister     Thyroid disease Sister     Brain cancer Paternal Grandmother     Heart disease Sister        Social History:   Social History     Socioeconomic History    Marital status:    Tobacco Use    Smoking status: Never    Smokeless tobacco: Never   Substance and Sexual Activity    Alcohol use: Not Currently     Comment: twice per year    Drug use: Never    Sexual activity: Yes     Partners: Male     Birth control/protection: Vasectomy, Hysterectomy     Comment: Only with  of 27 years       Medications:     Current Outpatient Medications:     Zavegepant HCl 10 MG/ACT solution, into the nostril(s) as directed by provider., Disp: , Rfl:     cloNIDine (CATAPRES) 0.1 MG tablet, TAKE 1 TABLET BY MOUTH TWICE DAILY FOR HOT FLASHES, Disp: 180 tablet, Rfl: 3    empagliflozin (Jardiance) 25 MG tablet tablet, Take 1 tablet by mouth Every Morning., Disp: 90 tablet, Rfl: 1    escitalopram (LEXAPRO) 10 MG tablet, Take 1 tablet by mouth Daily., Disp: 90 tablet, Rfl: 3    famotidine (PEPCID) 20 MG tablet, Take 1 tablet by mouth., Disp: , Rfl:     gabapentin (NEURONTIN) 600 MG tablet, Take 1 tablet by mouth 3 (Three) Times a Day., Disp: 270 tablet, Rfl: 1    levocetirizine (XYZAL) 5 MG tablet, Take 5 mg by mouth Every Evening., Disp: , Rfl:     levothyroxine (SYNTHROID, LEVOTHROID) 50 MCG tablet, Take 1 tablet by mouth Daily., Disp: 90 tablet, Rfl: 1    metFORMIN (GLUCOPHAGE) 1000 MG tablet, TAKE 1 TABLET TWICE A DAY  WITH MORNING AND EVENING   MEALS, Disp: 180 tablet, Rfl: 0    mometasone (ELOCON) 0.1 % cream, Apply 1 application topically to the appropriate area as directed., Disp: , Rfl:     nabumetone (Relafen) 500 MG tablet, Take 1  tablet by mouth 2 (Two) Times a Day As Needed for Mild Pain., Disp: 60 tablet, Rfl: 2    pantoprazole (PROTONIX) 40 MG EC tablet, Take 1 tablet by mouth Daily., Disp: 90 tablet, Rfl: 0    primidone (MYSOLINE) 50 MG tablet, Take 3 tablets by mouth Every Night. Please change the quant, Disp: 90 tablet, Rfl: 5    Semaglutide, 2 MG/DOSE, (OZEMPIC) 8 MG/3ML solution pen-injector, Inject 2 mg under the skin into the appropriate area as directed 1 (One) Time Per Week., Disp: 9 mL, Rfl: 1    tiZANidine (ZANAFLEX) 4 MG tablet, Take 1 tablet by mouth At Night As Needed for Muscle Spasms., Disp: 90 tablet, Rfl: 1    traMADol (ULTRAM) 50 MG tablet, Take 1 tablet by mouth 3 (Three) Times a Day., Disp: 270 tablet, Rfl: 0    triamcinolone (KENALOG) 0.1 % ointment, Apply To Affected Area As Needed, Disp: , Rfl:     Allergies:   Allergies   Allergen Reactions    Codeine GI Intolerance    Morphine GI Intolerance    Hydrocodone-Acetaminophen Itching    Cytoxan [Cyclophosphamide] Hives    Fluticasone Other (See Comments)    Ginger Other (See Comments)    Taxotere [Docetaxel] Hives    Vilanterol Other (See Comments)    Zyrtec [Cetirizine] Rash       Objective     Physical Exam:  Vital Signs:   Vitals:    08/25/23 1033   BP: 118/76   Pulse: 92   SpO2: 98%   Weight: 77.1 kg (170 lb)     Body mass index is 26.63 kg/mý.     Physical Exam  Vitals and nursing note reviewed.   Constitutional:       General: She is not in acute distress.     Appearance: Normal appearance. She is not ill-appearing.   HENT:      Head: Normocephalic and atraumatic.      Right Ear: Tympanic membrane and ear canal normal.      Left Ear: Tympanic membrane and ear canal normal.      Nose: Nose normal.   Cardiovascular:      Rate and Rhythm: Normal rate and regular rhythm.      Heart sounds: Normal heart sounds.   Pulmonary:      Effort: Pulmonary effort is normal.      Breath sounds: Normal breath sounds.   Neurological:      Mental Status: She is alert  and oriented to person, place, and time. Mental status is at baseline.   Psychiatric:         Mood and Affect: Mood normal.       Procedures      Assessment / Plan      Assessment/Plan:   Diagnoses and all orders for this visit:    1. Sleep apnea-like behavior (Primary)  -     Cancel: Ambulatory Referral to ENT (Otolaryngology)  -     Ambulatory Referral to ENT (Otolaryngology)    2. Vertigo  -     Cancel: Ambulatory Referral to ENT (Otolaryngology)  -     Ambulatory Referral to ENT (Otolaryngology)    3. Other migraine without status migrainosus, not intractable    4. Chronic low back pain without sciatica, unspecified back pain laterality  -     nabumetone (Relafen) 500 MG tablet; Take 1 tablet by mouth 2 (Two) Times a Day As Needed for Mild Pain.  Dispense: 60 tablet; Refill: 2         Made referral to ENT for further evaluation of the sleep apnea-like behavior as well as vertigo.  Continue current medications through pain management for the migraines.  Trial of nabumetone instead of her current anti-inflammatory medication for her back pain.    Follow Up:   No follow-ups on file.    Jason Benson  [facility] [place]

## 2023-09-05 RX ORDER — EMPAGLIFLOZIN 25 MG/1
TABLET, FILM COATED ORAL
Qty: 90 TABLET | Refills: 1 | Status: SHIPPED | OUTPATIENT
Start: 2023-09-05

## 2023-09-11 ENCOUNTER — PATIENT MESSAGE (OUTPATIENT)
Dept: FAMILY MEDICINE CLINIC | Facility: CLINIC | Age: 53
End: 2023-09-11
Payer: COMMERCIAL

## 2023-09-11 ENCOUNTER — TELEPHONE (OUTPATIENT)
Dept: FAMILY MEDICINE CLINIC | Facility: CLINIC | Age: 53
End: 2023-09-11
Payer: COMMERCIAL

## 2023-09-11 NOTE — TELEPHONE ENCOUNTER
Caller: Porsha Bolden    Relationship: Self    Best call back number: [phone]     What is the medical concern/diagnosis: DIZZINESS    What specialty or service is being requested: ENT TO ASSESS IF SLEEP STUDY IS NEEDED    What is the provider, practice or medical service name: [facility]: Olu OLIVER MD    What is the office location: Address: [address] [place] [address] [address] [place] [address]    What is the office phone number:  [phone]    Any additional details: PATIENT IS AN ESTABLISHED PATIENT WITH THIS OFFICE. THEY HAVE NOT RECEIVED THE REFERRAL AND PATIENT HAS NOT BEEN CONTACTED. PLEASE CALL WITH THE DETAILS.    PATIENT WILL CALL HERSELF TO SCHEDULE ONCE THE REFERRAL IS SENT. PLEASE NOTIFY.    PATIENT ALSO STATES SHE SENT A MESSAGE THRU [employer]- ASKING FOR FOLLOW UP.

## 2023-09-12 NOTE — TELEPHONE ENCOUNTER
From: Porsha Bolden  To: Jason Benson  Sent: 9/11/2023 11:12 AM EDT  Subject: Nabumetone    Hi Yimi,    Well, it has been a few weeks and I think that the Nabumetone is not working for me. I have had a persistent upset stomach, and the pain is higher than it was with the previous med.    Can we try something else or rebalance another med to try to relieve the pain?

## 2023-09-18 DIAGNOSIS — M54.50 ACUTE LOW BACK PAIN WITHOUT SCIATICA, UNSPECIFIED BACK PAIN LATERALITY: ICD-10-CM

## 2023-09-18 RX ORDER — GABAPENTIN 600 MG/1
TABLET ORAL
Qty: 270 TABLET | Refills: 1 | Status: SHIPPED | OUTPATIENT
Start: 2023-09-18

## 2023-09-22 RX ORDER — SEMAGLUTIDE 2.68 MG/ML
INJECTION, SOLUTION SUBCUTANEOUS
Qty: 9 ML | Refills: 1 | Status: SHIPPED | OUTPATIENT
Start: 2023-09-22

## 2023-10-25 RX ORDER — PANTOPRAZOLE SODIUM 40 MG/1
40 TABLET, DELAYED RELEASE ORAL DAILY
Qty: 90 TABLET | Refills: 0 | Status: SHIPPED | OUTPATIENT
Start: 2023-10-25

## 2023-11-04 DIAGNOSIS — M54.50 ACUTE LOW BACK PAIN WITHOUT SCIATICA, UNSPECIFIED BACK PAIN LATERALITY: ICD-10-CM

## 2023-11-06 RX ORDER — TRAMADOL HYDROCHLORIDE 50 MG/1
50 TABLET ORAL 3 TIMES DAILY
Qty: 90 TABLET | Refills: 0 | Status: SHIPPED | OUTPATIENT
Start: 2023-11-06

## 2023-11-06 RX ORDER — TIZANIDINE 4 MG/1
4 TABLET ORAL NIGHTLY PRN
Qty: 90 TABLET | Refills: 0 | Status: SHIPPED | OUTPATIENT
Start: 2023-11-06

## 2023-11-20 ENCOUNTER — LAB (OUTPATIENT)
Dept: FAMILY MEDICINE CLINIC | Facility: CLINIC | Age: 53
End: 2023-11-20
Payer: COMMERCIAL

## 2023-11-20 DIAGNOSIS — E78.2 MIXED HYPERLIPIDEMIA: ICD-10-CM

## 2023-11-20 DIAGNOSIS — E11.9 TYPE 2 DIABETES MELLITUS WITHOUT COMPLICATION, WITHOUT LONG-TERM CURRENT USE OF INSULIN: Primary | ICD-10-CM

## 2023-11-21 LAB
ALBUMIN SERPL-MCNC: 4.5 G/DL (ref 3.8–4.9)
ALBUMIN/GLOB SERPL: 1.9 {RATIO} (ref 1.2–2.2)
ALP SERPL-CCNC: 139 IU/L (ref 44–121)
ALT SERPL-CCNC: 21 IU/L (ref 0–32)
AST SERPL-CCNC: 16 IU/L (ref 0–40)
BILIRUB SERPL-MCNC: <0.2 MG/DL (ref 0–1.2)
BUN SERPL-MCNC: 14 MG/DL (ref 6–24)
BUN/CREAT SERPL: 19 (ref 9–23)
CALCIUM SERPL-MCNC: 9.6 MG/DL (ref 8.7–10.2)
CHLORIDE SERPL-SCNC: 100 MMOL/L (ref 96–106)
CHOLEST SERPL-MCNC: 207 MG/DL (ref 100–199)
CO2 SERPL-SCNC: 23 MMOL/L (ref 20–29)
CREAT SERPL-MCNC: 0.73 MG/DL (ref 0.57–1)
EGFRCR SERPLBLD CKD-EPI 2021: 98 ML/MIN/1.73
GLOBULIN SER CALC-MCNC: 2.4 G/DL (ref 1.5–4.5)
GLUCOSE SERPL-MCNC: 118 MG/DL (ref 70–99)
HBA1C MFR BLD: 8 % (ref 4.8–5.6)
HDLC SERPL-MCNC: 56 MG/DL
LDLC SERPL CALC-MCNC: 130 MG/DL (ref 0–99)
POTASSIUM SERPL-SCNC: 4.1 MMOL/L (ref 3.5–5.2)
PROT SERPL-MCNC: 6.9 G/DL (ref 6–8.5)
SODIUM SERPL-SCNC: 139 MMOL/L (ref 134–144)
TRIGL SERPL-MCNC: 120 MG/DL (ref 0–149)
VLDLC SERPL CALC-MCNC: 21 MG/DL (ref 5–40)

## 2023-11-27 ENCOUNTER — OFFICE VISIT (OUTPATIENT)
Dept: FAMILY MEDICINE CLINIC | Facility: CLINIC | Age: 53
End: 2023-11-27
Payer: COMMERCIAL

## 2023-11-27 VITALS
OXYGEN SATURATION: 98 % | SYSTOLIC BLOOD PRESSURE: 140 MMHG | DIASTOLIC BLOOD PRESSURE: 62 MMHG | BODY MASS INDEX: 27.61 KG/M2 | HEART RATE: 102 BPM | HEIGHT: 67 IN | WEIGHT: 175.9 LBS

## 2023-11-27 DIAGNOSIS — E11.9 TYPE 2 DIABETES MELLITUS WITHOUT COMPLICATION, WITHOUT LONG-TERM CURRENT USE OF INSULIN: Primary | ICD-10-CM

## 2023-11-27 DIAGNOSIS — G89.29 CHRONIC LOW BACK PAIN WITHOUT SCIATICA, UNSPECIFIED BACK PAIN LATERALITY: ICD-10-CM

## 2023-11-27 DIAGNOSIS — E03.9 ACQUIRED HYPOTHYROIDISM: ICD-10-CM

## 2023-11-27 DIAGNOSIS — M54.50 CHRONIC LOW BACK PAIN WITHOUT SCIATICA, UNSPECIFIED BACK PAIN LATERALITY: ICD-10-CM

## 2023-11-27 DIAGNOSIS — G43.809 OTHER MIGRAINE WITHOUT STATUS MIGRAINOSUS, NOT INTRACTABLE: ICD-10-CM

## 2023-11-27 LAB
EXPIRATION DATE: NORMAL
Lab: NORMAL
POC CREATININE URINE: 200
POC MICROALBUMIN URINE: 30

## 2023-11-27 PROCEDURE — 90686 IIV4 VACC NO PRSV 0.5 ML IM: CPT | Performed by: FAMILY MEDICINE

## 2023-11-27 PROCEDURE — 99214 OFFICE O/P EST MOD 30 MIN: CPT | Performed by: FAMILY MEDICINE

## 2023-11-27 PROCEDURE — 82044 UR ALBUMIN SEMIQUANTITATIVE: CPT | Performed by: FAMILY MEDICINE

## 2023-11-27 PROCEDURE — 90471 IMMUNIZATION ADMIN: CPT | Performed by: FAMILY MEDICINE

## 2023-11-27 RX ORDER — DICLOFENAC SODIUM 25 MG/1
25 TABLET, DELAYED RELEASE ORAL 4 TIMES DAILY
Qty: 120 TABLET | Refills: 5 | Status: SHIPPED | OUTPATIENT
Start: 2023-11-27

## 2023-11-27 RX ORDER — LEVOTHYROXINE SODIUM 0.05 MG/1
50 TABLET ORAL DAILY
Qty: 90 TABLET | Refills: 1 | Status: SHIPPED | OUTPATIENT
Start: 2023-11-27

## 2023-11-27 RX ORDER — SUMATRIPTAN 25 MG/1
TABLET, FILM COATED ORAL
Qty: 9 TABLET | Refills: 5 | Status: SHIPPED | OUTPATIENT
Start: 2023-11-27

## 2023-11-28 NOTE — PROGRESS NOTES
Follow Up Office Visit      Date of Visit:  2023   Patient Name: Porsha Bolden  : 1970   MRN: [id number]     Chief Complaint:    Chief Complaint   Patient presents with    Annual Exam    Flu Vaccine       History of Present Illness: Porsha Bolden is a 53 y.o. female who is here today for follow up.  Patient seen today for recheck on her recent labs due to her diabetes and hypothyroidism.  Diabetes is not as well-controlled.  Patient having more issues really silly with her back and hips.  Seeing pain management.  This also having some more issues with her migraines.  Having to take Imitrex because of insurance.        Subjective      Review of Systems:   Review of Systems   Constitutional:  Negative for fatigue and fever.   HENT:  Negative for congestion and ear pain.    Respiratory:  Negative for apnea, cough, chest tightness and shortness of breath.    Cardiovascular:  Negative for chest pain.   Gastrointestinal:  Negative for abdominal pain, constipation, diarrhea and nausea.   Musculoskeletal:  Negative for arthralgias.   Psychiatric/Behavioral:  Negative for depressed mood and stress.        Past Medical History:   Past Medical History:   Diagnosis Date    Allergic     Arthritis     Breast cancer 2018    Diabetes mellitus     Disease of thyroid gland     Fibromyalgia, primary     Headache     HL (hearing loss)     Hypothyroidism     Low back pain     Scoliosis     Tremor     Visual impairment        Past Surgical History:   Past Surgical History:   Procedure Laterality Date    BREAST LUMPECTOMY Left 2018    COLONOSCOPY      EYE SURGERY      Lasix    FRACTURE SURGERY      HYSTERECTOMY      fibroid    KNEE ARTHROSCOPY  2004    KNEE SURGERY      x4    REFRACTIVE SURGERY      Lasik    SUBTOTAL HYSTERECTOMY         Family History:   Family History   Problem Relation Age of Onset    Uterine cancer Mother     Arthritis Mother     Cancer Mother     Hearing loss Mother     Heart  disease Mother     Hyperlipidemia Mother     Stroke Mother     Vision loss Mother     Lung cancer Father     Brain cancer Father 69    Cancer Father     Hearing loss Father     Breast cancer Sister     Arthritis Sister     Cancer Sister     Thyroid disease Sister     Brain cancer Paternal Grandmother     Heart disease Sister        Social History:   Social History     Socioeconomic History    Marital status:    Tobacco Use    Smoking status: Never    Smokeless tobacco: Never   Substance and Sexual Activity    Alcohol use: Not Currently     Comment: twice per year    Drug use: Never    Sexual activity: Yes     Partners: Male     Birth control/protection: Vasectomy, Hysterectomy     Comment: Only with  of 27 years       Medications:     Current Outpatient Medications:     levothyroxine (SYNTHROID, LEVOTHROID) 50 MCG tablet, Take 1 tablet by mouth Daily., Disp: 90 tablet, Rfl: 1    cloNIDine (CATAPRES) 0.1 MG tablet, TAKE 1 TABLET BY MOUTH TWICE DAILY FOR HOT FLASHES, Disp: 180 tablet, Rfl: 3    diclofenac (VOLTAREN) 25 MG EC tablet, Take 1 tablet by mouth 4 (Four) Times a Day., Disp: 120 tablet, Rfl: 5    escitalopram (LEXAPRO) 10 MG tablet, Take 1 tablet by mouth Daily., Disp: 90 tablet, Rfl: 3    famotidine (PEPCID) 20 MG tablet, Take 1 tablet by mouth., Disp: , Rfl:     gabapentin (NEURONTIN) 600 MG tablet, TAKE 1 TABLET 3 TIMES A DAY, Disp: 270 tablet, Rfl: 1    Jardiance 25 MG tablet tablet, TAKE 1 TABLET EVERY MORNING, Disp: 90 tablet, Rfl: 1    levocetirizine (XYZAL) 5 MG tablet, Take 5 mg by mouth Every Evening., Disp: , Rfl:     metFORMIN (GLUCOPHAGE) 1000 MG tablet, TAKE 1 TABLET TWICE A DAY  WITH MORNING AND EVENING   MEALS, Disp: 180 tablet, Rfl: 0    mometasone (ELOCON) 0.1 % cream, Apply 1 application topically to the appropriate area as directed., Disp: , Rfl:     Ozempic, 2 MG/DOSE, 8 MG/3ML solution pen-injector, INJECT 2MG SUBCUTANEOUSLY  INTO THE APPROPRIATE AREA  ONCE WEEKLY AS  "DIRECTED, Disp: 9 mL, Rfl: 1    pantoprazole (PROTONIX) 40 MG EC tablet, TAKE 1 TABLET DAILY, Disp: 90 tablet, Rfl: 0    primidone (MYSOLINE) 50 MG tablet, Take 3 tablets by mouth Every Night. Please change the quant, Disp: 90 tablet, Rfl: 5    SUMAtriptan (Imitrex) 25 MG tablet, Take one tablet at onset of headache. May repeat dose one time in 2 hours if headache not relieved., Disp: 9 tablet, Rfl: 5    tiZANidine (ZANAFLEX) 4 MG tablet, TAKE 1 TABLET BY MOUTH AT NIGHT AS NEEDED FOR MUSCLE SPASM, Disp: 90 tablet, Rfl: 0    traMADol (ULTRAM) 50 MG tablet, TAKE 1 TABLET BY MOUTH THREE TIMES DAILY, Disp: 90 tablet, Rfl: 0    triamcinolone (KENALOG) 0.1 % ointment, Apply To Affected Area As Needed, Disp: , Rfl:     Zavegepant HCl 10 MG/ACT solution, into the nostril(s) as directed by provider., Disp: , Rfl:     Allergies:   Allergies   Allergen Reactions    Codeine GI Intolerance    Morphine GI Intolerance    Hydrocodone-Acetaminophen Itching    Cytoxan [Cyclophosphamide] Hives    Fluticasone Other (See Comments)    Ginger Other (See Comments)    Taxotere [Docetaxel] Hives    Vilanterol Other (See Comments)    Zyrtec [Cetirizine] Rash       Objective     Physical Exam:  Vital Signs:   Vitals:    11/27/23 1356   BP: 140/62   Pulse: 102   SpO2: 98%   Weight: 79.8 kg (175 lb 14.4 oz)   Height: 170.2 cm (67\")     Body mass index is 27.55 kg/m².     Physical Exam  Vitals and nursing note reviewed.   Constitutional:       General: She is not in acute distress.     Appearance: Normal appearance. She is not ill-appearing.   HENT:      Head: Normocephalic and atraumatic.      Right Ear: Tympanic membrane and ear canal normal.      Left Ear: Tympanic membrane and ear canal normal.      Nose: Nose normal.   Cardiovascular:      Rate and Rhythm: Normal rate and regular rhythm.      Heart sounds: Normal heart sounds.   Pulmonary:      Effort: Pulmonary effort is normal.      Breath sounds: Normal breath sounds.   Neurological:      " Mental Status: She is alert and oriented to person, place, and time. Mental status is at baseline.   Psychiatric:         Mood and Affect: Mood normal.         Procedures      Assessment / Plan      Assessment/Plan:   Diagnoses and all orders for this visit:    1. Type 2 diabetes mellitus without complication, without long-term current use of insulin (Primary)  -     POCT microalbumin    2. Chronic low back pain without sciatica, unspecified back pain laterality  -     diclofenac (VOLTAREN) 25 MG EC tablet; Take 1 tablet by mouth 4 (Four) Times a Day.  Dispense: 120 tablet; Refill: 5    3. Other migraine without status migrainosus, not intractable  -     SUMAtriptan (Imitrex) 25 MG tablet; Take one tablet at onset of headache. May repeat dose one time in 2 hours if headache not relieved.  Dispense: 9 tablet; Refill: 5    4. Acquired hypothyroidism  -     levothyroxine (SYNTHROID, LEVOTHROID) 50 MCG tablet; Take 1 tablet by mouth Daily.  Dispense: 90 tablet; Refill: 1    Other orders  -     Fluzone >6 Months (2164-8998)         Patient will be more observant and watchful of her diet over the next 3 months.  No changes in current diabetic medication.  Continue to see pain management for her back.  Continue current medicines that we give her for this.  Refilled her migraine medication.  Also refilled current dose of thyroid medication.    Follow Up:   No follow-ups on file.    Jason Benson  [facility]

## 2023-12-21 RX ORDER — CELECOXIB 200 MG/1
200 CAPSULE ORAL DAILY
Qty: 90 CAPSULE | Refills: 1 | OUTPATIENT
Start: 2023-12-21

## 2024-01-24 DIAGNOSIS — M54.50 ACUTE LOW BACK PAIN WITHOUT SCIATICA, UNSPECIFIED BACK PAIN LATERALITY: ICD-10-CM

## 2024-01-25 RX ORDER — PRIMIDONE 50 MG/1
150 TABLET ORAL NIGHTLY
Qty: 90 TABLET | Refills: 2 | Status: SHIPPED | OUTPATIENT
Start: 2024-01-25

## 2024-01-25 RX ORDER — TRAMADOL HYDROCHLORIDE 50 MG/1
50 TABLET ORAL 3 TIMES DAILY
Qty: 90 TABLET | Refills: 0 | Status: SHIPPED | OUTPATIENT
Start: 2024-01-25

## 2024-02-28 DIAGNOSIS — M54.50 ACUTE LOW BACK PAIN WITHOUT SCIATICA, UNSPECIFIED BACK PAIN LATERALITY: ICD-10-CM

## 2024-02-29 RX ORDER — TRAMADOL HYDROCHLORIDE 50 MG/1
50 TABLET ORAL 3 TIMES DAILY
Qty: 90 TABLET | Refills: 0 | Status: SHIPPED | OUTPATIENT
Start: 2024-02-29

## 2024-02-29 RX ORDER — TIZANIDINE 4 MG/1
4 TABLET ORAL NIGHTLY PRN
Qty: 90 TABLET | Refills: 0 | Status: SHIPPED | OUTPATIENT
Start: 2024-02-29

## 2024-04-01 DIAGNOSIS — M54.50 ACUTE LOW BACK PAIN WITHOUT SCIATICA, UNSPECIFIED BACK PAIN LATERALITY: ICD-10-CM

## 2024-04-02 DIAGNOSIS — M54.50 ACUTE LOW BACK PAIN WITHOUT SCIATICA, UNSPECIFIED BACK PAIN LATERALITY: ICD-10-CM

## 2024-04-02 RX ORDER — GABAPENTIN 600 MG/1
600 TABLET ORAL 3 TIMES DAILY
Qty: 270 TABLET | Refills: 0 | Status: SHIPPED | OUTPATIENT
Start: 2024-04-02

## 2024-04-03 RX ORDER — TRAMADOL HYDROCHLORIDE 50 MG/1
50 TABLET ORAL 3 TIMES DAILY
Qty: 90 TABLET | Refills: 0 | Status: SHIPPED | OUTPATIENT
Start: 2024-04-03

## 2024-05-14 DIAGNOSIS — M54.50 ACUTE LOW BACK PAIN WITHOUT SCIATICA, UNSPECIFIED BACK PAIN LATERALITY: ICD-10-CM

## 2024-05-14 RX ORDER — TRAMADOL HYDROCHLORIDE 50 MG/1
50 TABLET ORAL 3 TIMES DAILY
Qty: 90 TABLET | Refills: 0 | Status: SHIPPED | OUTPATIENT
Start: 2024-05-14

## 2024-05-14 RX ORDER — PRIMIDONE 50 MG/1
TABLET ORAL
Qty: 90 TABLET | Refills: 0 | Status: SHIPPED | OUTPATIENT
Start: 2024-05-14

## 2024-05-28 DIAGNOSIS — E78.2 MIXED HYPERLIPIDEMIA: ICD-10-CM

## 2024-05-28 DIAGNOSIS — E11.9 TYPE 2 DIABETES MELLITUS WITHOUT COMPLICATION, WITHOUT LONG-TERM CURRENT USE OF INSULIN: Primary | ICD-10-CM

## 2024-05-28 DIAGNOSIS — E03.9 ACQUIRED HYPOTHYROIDISM: ICD-10-CM

## 2024-05-29 ENCOUNTER — OFFICE VISIT (OUTPATIENT)
Dept: FAMILY MEDICINE CLINIC | Facility: CLINIC | Age: 54
End: 2024-05-29
Payer: COMMERCIAL

## 2024-05-29 VITALS
SYSTOLIC BLOOD PRESSURE: 140 MMHG | OXYGEN SATURATION: 94 % | WEIGHT: 176 LBS | HEART RATE: 79 BPM | HEIGHT: 67 IN | BODY MASS INDEX: 27.62 KG/M2 | DIASTOLIC BLOOD PRESSURE: 76 MMHG

## 2024-05-29 DIAGNOSIS — M54.50 ACUTE LOW BACK PAIN WITHOUT SCIATICA, UNSPECIFIED BACK PAIN LATERALITY: ICD-10-CM

## 2024-05-29 DIAGNOSIS — K21.9 GASTROESOPHAGEAL REFLUX DISEASE WITHOUT ESOPHAGITIS: ICD-10-CM

## 2024-05-29 DIAGNOSIS — F39 MOOD DISORDER: ICD-10-CM

## 2024-05-29 DIAGNOSIS — M19.90 ARTHRITIS: ICD-10-CM

## 2024-05-29 DIAGNOSIS — E11.9 TYPE 2 DIABETES MELLITUS WITHOUT COMPLICATION, WITHOUT LONG-TERM CURRENT USE OF INSULIN: Primary | ICD-10-CM

## 2024-05-29 LAB
EXPIRATION DATE: ABNORMAL
HBA1C MFR BLD: 8.1 % (ref 4.5–5.7)
Lab: ABNORMAL

## 2024-05-29 PROCEDURE — 99214 OFFICE O/P EST MOD 30 MIN: CPT | Performed by: FAMILY MEDICINE

## 2024-05-29 PROCEDURE — 83036 HEMOGLOBIN GLYCOSYLATED A1C: CPT | Performed by: FAMILY MEDICINE

## 2024-05-29 RX ORDER — ESCITALOPRAM OXALATE 10 MG/1
10 TABLET ORAL DAILY
Qty: 90 TABLET | Refills: 3 | Status: SHIPPED | OUTPATIENT
Start: 2024-05-29

## 2024-05-29 RX ORDER — TRAMADOL HYDROCHLORIDE 50 MG/1
50 TABLET ORAL 3 TIMES DAILY
Qty: 270 TABLET | Refills: 0 | Status: SHIPPED | OUTPATIENT
Start: 2024-05-29

## 2024-05-29 RX ORDER — CYCLOBENZAPRINE HCL 10 MG
10 TABLET ORAL 2 TIMES DAILY PRN
Qty: 60 TABLET | Refills: 5 | Status: SHIPPED | OUTPATIENT
Start: 2024-05-29

## 2024-05-29 RX ORDER — GABAPENTIN 600 MG/1
600 TABLET ORAL 3 TIMES DAILY
Qty: 270 TABLET | Refills: 0 | Status: SHIPPED | OUTPATIENT
Start: 2024-05-29

## 2024-05-29 RX ORDER — FAMOTIDINE 20 MG/1
20 TABLET, FILM COATED ORAL DAILY
Qty: 90 TABLET | Refills: 1 | Status: SHIPPED | OUTPATIENT
Start: 2024-05-29

## 2024-05-29 NOTE — PROGRESS NOTES
Follow Up Office Visit      Date of Visit:  2024   Patient Name: Porsha Bolden  : 1970   MRN: [id number]     Chief Complaint:    Chief Complaint   Patient presents with    Diabetes       History of Present Illness: Porsha Bolden is a 54 y.o. female who is here today for follow up.  Patient seen today in follow-up of her diabetes and chronic pain syndromes and GERD.  Pain not as controlled on current medications.  Currently does also see pain management.  Reviewed medications and changes that had been previously made.  Sugars also not as controlled.        Subjective      Review of Systems:   Review of Systems   Constitutional:  Negative for fatigue and fever.   HENT:  Negative for congestion and ear pain.    Respiratory:  Negative for apnea, cough, chest tightness and shortness of breath.    Cardiovascular:  Negative for chest pain.   Gastrointestinal:  Negative for abdominal pain, constipation, diarrhea and nausea.   Musculoskeletal:  Positive for arthralgias.   Psychiatric/Behavioral:  Negative for depressed mood and stress.        Past Medical History:   Past Medical History:   Diagnosis Date    Allergic     Arthritis     Breast cancer 2018    Diabetes mellitus     Disease of thyroid gland     Fibromyalgia, primary     Headache     HL (hearing loss)     Hypothyroidism     Kidney stone     Low back pain     Scoliosis     Tremor     Visual impairment        Past Surgical History:   Past Surgical History:   Procedure Laterality Date    BREAST LUMPECTOMY Left 2018    COLONOSCOPY      EYE SURGERY      Lasix    FRACTURE SURGERY      HYSTERECTOMY      fibroid    KNEE ARTHROSCOPY  2004    KNEE SURGERY      x4    REFRACTIVE SURGERY      Lasik    SUBTOTAL HYSTERECTOMY         Family History:   Family History   Problem Relation Age of Onset    Uterine cancer Mother     Arthritis Mother     Cancer Mother     Hearing loss Mother     Heart disease Mother     Hyperlipidemia Mother      Stroke Mother     Vision loss Mother     Lung cancer Father     Brain cancer Father 69    Cancer Father     Hearing loss Father     Breast cancer Sister     Arthritis Sister     Cancer Sister     Thyroid disease Sister     Brain cancer Paternal Grandmother     Heart disease Sister        Social History:   Social History     Socioeconomic History    Marital status:    Tobacco Use    Smoking status: Never    Smokeless tobacco: Never   Substance and Sexual Activity    Alcohol use: Not Currently     Comment: twice per year    Drug use: Never    Sexual activity: Yes     Partners: Male     Birth control/protection: Vasectomy, Hysterectomy     Comment: Only with  of 27 years       Medications:     Current Outpatient Medications:     escitalopram (LEXAPRO) 10 MG tablet, Take 1 tablet by mouth Daily., Disp: 90 tablet, Rfl: 3    famotidine (PEPCID) 20 MG tablet, Take 1 tablet by mouth Daily., Disp: 90 tablet, Rfl: 1    gabapentin (NEURONTIN) 600 MG tablet, Take 1 tablet by mouth 3 (Three) Times a Day. Will need appt for further refills., Disp: 270 tablet, Rfl: 0    traMADol (ULTRAM) 50 MG tablet, Take 1 tablet by mouth 3 (Three) Times a Day., Disp: 270 tablet, Rfl: 0    cloNIDine (CATAPRES) 0.1 MG tablet, TAKE 1 TABLET BY MOUTH TWICE DAILY FOR HOT FLASHES, Disp: 180 tablet, Rfl: 3    cyclobenzaprine (FLEXERIL) 10 MG tablet, Take 1 tablet by mouth 2 (Two) Times a Day As Needed for Muscle Spasms., Disp: 60 tablet, Rfl: 5    Jardiance 25 MG tablet tablet, TAKE 1 TABLET EVERY MORNING, Disp: 90 tablet, Rfl: 1    levocetirizine (XYZAL) 5 MG tablet, Take 5 mg by mouth Every Evening., Disp: , Rfl:     levothyroxine (SYNTHROID, LEVOTHROID) 50 MCG tablet, Take 1 tablet by mouth Daily., Disp: 90 tablet, Rfl: 1    metFORMIN (GLUCOPHAGE) 1000 MG tablet, TAKE 1 TABLET TWICE A DAY  WITH MORNING AND EVENING   MEALS, Disp: 180 tablet, Rfl: 0    mometasone (ELOCON) 0.1 % cream, Apply 1 application topically to the appropriate  "area as directed., Disp: , Rfl:     Ozempic, 2 MG/DOSE, 8 MG/3ML solution pen-injector, INJECT 2MG SUBCUTANEOUSLY  INTO THE APPROPRIATE AREA  ONCE WEEKLY AS DIRECTED, Disp: 9 mL, Rfl: 1    pantoprazole (PROTONIX) 40 MG EC tablet, TAKE 1 TABLET DAILY, Disp: 90 tablet, Rfl: 0    SUMAtriptan (Imitrex) 25 MG tablet, Take one tablet at onset of headache. May repeat dose one time in 2 hours if headache not relieved., Disp: 9 tablet, Rfl: 5    triamcinolone (KENALOG) 0.1 % ointment, Apply To Affected Area As Needed, Disp: , Rfl:     Zavegepant HCl 10 MG/ACT solution, into the nostril(s) as directed by provider., Disp: , Rfl:     Allergies:   Allergies   Allergen Reactions    Codeine GI Intolerance    Morphine GI Intolerance    Hydrocodone-Acetaminophen Itching    Cytoxan [Cyclophosphamide] Hives    Fluticasone Other (See Comments)    Ginger Other (See Comments)    Taxotere [Docetaxel] Hives    Vilanterol Other (See Comments)    Zyrtec [Cetirizine] Rash       Objective     Physical Exam:  Vital Signs:   Vitals:    05/29/24 1351   BP: 140/76   Pulse: 79   SpO2: 94%   Weight: 79.8 kg (176 lb)   Height: 170.2 cm (67\")     Body mass index is 27.57 kg/m².     Physical Exam  Vitals and nursing note reviewed.   Constitutional:       General: She is not in acute distress.     Appearance: Normal appearance. She is not ill-appearing.   HENT:      Head: Normocephalic and atraumatic.      Right Ear: Tympanic membrane and ear canal normal.      Left Ear: Tympanic membrane and ear canal normal.      Nose: Nose normal.   Cardiovascular:      Rate and Rhythm: Normal rate and regular rhythm.      Heart sounds: Normal heart sounds.   Pulmonary:      Effort: Pulmonary effort is normal.      Breath sounds: Normal breath sounds.   Neurological:      Mental Status: She is alert and oriented to person, place, and time. Mental status is at baseline.   Psychiatric:         Mood and Affect: Mood normal.         Procedures      Assessment / Plan  "     Assessment/Plan:   Diagnoses and all orders for this visit:    1. Type 2 diabetes mellitus without complication, without long-term current use of insulin (Primary)  -     POC Glycosylated Hemoglobin (Hb A1C)    2. Arthritis  -     Sedimentation rate, automated  -     ELIZABETH  -     C-reactive protein  -     Cyclic Citrul Peptide Antibody, IgG / IgA    3. Mood disorder  -     escitalopram (LEXAPRO) 10 MG tablet; Take 1 tablet by mouth Daily.  Dispense: 90 tablet; Refill: 3    4. Acute low back pain without sciatica, unspecified back pain laterality  Comments:  Continue current medications for low back pain.  Continue to see specialist.  Jack report appropriate. medication refills given.  Orders:  -     cyclobenzaprine (FLEXERIL) 10 MG tablet; Take 1 tablet by mouth 2 (Two) Times a Day As Needed for Muscle Spasms.  Dispense: 60 tablet; Refill: 5  -     traMADol (ULTRAM) 50 MG tablet; Take 1 tablet by mouth 3 (Three) Times a Day.  Dispense: 270 tablet; Refill: 0  -     gabapentin (NEURONTIN) 600 MG tablet; Take 1 tablet by mouth 3 (Three) Times a Day. Will need appt for further refills.  Dispense: 270 tablet; Refill: 0    5. Acute low back pain without sciatica, unspecified back pain laterality  -     cyclobenzaprine (FLEXERIL) 10 MG tablet; Take 1 tablet by mouth 2 (Two) Times a Day As Needed for Muscle Spasms.  Dispense: 60 tablet; Refill: 5  -     traMADol (ULTRAM) 50 MG tablet; Take 1 tablet by mouth 3 (Three) Times a Day.  Dispense: 270 tablet; Refill: 0  -     gabapentin (NEURONTIN) 600 MG tablet; Take 1 tablet by mouth 3 (Three) Times a Day. Will need appt for further refills.  Dispense: 270 tablet; Refill: 0    6. Gastroesophageal reflux disease without esophagitis  -     famotidine (PEPCID) 20 MG tablet; Take 1 tablet by mouth Daily.  Dispense: 90 tablet; Refill: 1         Medication adjustments made to try to control her discomfort better.  We are checking blood work for autoimmune types of conditions as  well.  Refills were given on medications today.  Sugar not as controlled.  She had stopped metformin.  Patient will restart medication.  No changes otherwise in her medication.    Follow Up:   No follow-ups on file.    Jason Benson  [facility] [place]

## 2024-05-30 LAB
ALBUMIN SERPL-MCNC: 4.7 G/DL (ref 3.8–4.9)
ALBUMIN/GLOB SERPL: 1.7 {RATIO} (ref 1.2–2.2)
ALP SERPL-CCNC: 158 IU/L (ref 44–121)
ALT SERPL-CCNC: 24 IU/L (ref 0–32)
ANA SER QL: NEGATIVE
AST SERPL-CCNC: 17 IU/L (ref 0–40)
BASOPHILS # BLD AUTO: 0.1 X10E3/UL (ref 0–0.2)
BASOPHILS NFR BLD AUTO: 1 %
BILIRUB SERPL-MCNC: 0.3 MG/DL (ref 0–1.2)
BUN SERPL-MCNC: 17 MG/DL (ref 6–24)
BUN/CREAT SERPL: 24 (ref 9–23)
CALCIUM SERPL-MCNC: 10 MG/DL (ref 8.7–10.2)
CCP IGA+IGG SERPL IA-ACNC: 5 UNITS (ref 0–19)
CHLORIDE SERPL-SCNC: 99 MMOL/L (ref 96–106)
CHOLEST SERPL-MCNC: 260 MG/DL (ref 100–199)
CO2 SERPL-SCNC: 21 MMOL/L (ref 20–29)
CREAT SERPL-MCNC: 0.7 MG/DL (ref 0.57–1)
CRP SERPL-MCNC: 2 MG/L (ref 0–10)
EGFRCR SERPLBLD CKD-EPI 2021: 102 ML/MIN/1.73
EOSINOPHIL # BLD AUTO: 0.3 X10E3/UL (ref 0–0.4)
EOSINOPHIL NFR BLD AUTO: 4 %
ERYTHROCYTE [DISTWIDTH] IN BLOOD BY AUTOMATED COUNT: 13.6 % (ref 11.7–15.4)
ERYTHROCYTE [SEDIMENTATION RATE] IN BLOOD BY WESTERGREN METHOD: 5 MM/HR (ref 0–40)
GLOBULIN SER CALC-MCNC: 2.8 G/DL (ref 1.5–4.5)
GLUCOSE SERPL-MCNC: 159 MG/DL (ref 70–99)
HBA1C MFR BLD: 8.4 % (ref 4.8–5.6)
HCT VFR BLD AUTO: 42.7 % (ref 34–46.6)
HDLC SERPL-MCNC: 47 MG/DL
HGB BLD-MCNC: 14 G/DL (ref 11.1–15.9)
IMM GRANULOCYTES # BLD AUTO: 0 X10E3/UL (ref 0–0.1)
IMM GRANULOCYTES NFR BLD AUTO: 1 %
LDLC SERPL CALC-MCNC: 173 MG/DL (ref 0–99)
LYMPHOCYTES # BLD AUTO: 2.4 X10E3/UL (ref 0.7–3.1)
LYMPHOCYTES NFR BLD AUTO: 32 %
MCH RBC QN AUTO: 27.6 PG (ref 26.6–33)
MCHC RBC AUTO-ENTMCNC: 32.8 G/DL (ref 31.5–35.7)
MCV RBC AUTO: 84 FL (ref 79–97)
MONOCYTES # BLD AUTO: 0.3 X10E3/UL (ref 0.1–0.9)
MONOCYTES NFR BLD AUTO: 4 %
NEUTROPHILS # BLD AUTO: 4.5 X10E3/UL (ref 1.4–7)
NEUTROPHILS NFR BLD AUTO: 58 %
PLATELET # BLD AUTO: 325 X10E3/UL (ref 150–450)
POTASSIUM SERPL-SCNC: 4 MMOL/L (ref 3.5–5.2)
PROT SERPL-MCNC: 7.5 G/DL (ref 6–8.5)
RBC # BLD AUTO: 5.07 X10E6/UL (ref 3.77–5.28)
SODIUM SERPL-SCNC: 136 MMOL/L (ref 134–144)
TRIGL SERPL-MCNC: 216 MG/DL (ref 0–149)
TSH SERPL DL<=0.005 MIU/L-ACNC: 1.78 UIU/ML (ref 0.45–4.5)
VLDLC SERPL CALC-MCNC: 40 MG/DL (ref 5–40)
WBC # BLD AUTO: 7.6 X10E3/UL (ref 3.4–10.8)

## 2024-05-31 ENCOUNTER — PATIENT MESSAGE (OUTPATIENT)
Dept: FAMILY MEDICINE CLINIC | Facility: CLINIC | Age: 54
End: 2024-05-31
Payer: COMMERCIAL

## 2024-06-03 NOTE — TELEPHONE ENCOUNTER
From: Porsha Bolden  To: Jason Benson  Sent: 5/31/2024 10:58 AM EDT  Subject: Test results, specifically Alkaline Phosphatase    Yimi,   I see my Blood test results and did not see any surprises. However, I do see a concerning number and trend; concerning especially since I have questioned medicine effects on the liver AND more concerning since I have had breast cancer and this number can be an indicator of metastasis.   Please review the ALP number and trend as well as other numbers like Protein increases, etc. .     What tests do you think we should do to assess what is driving the number?    What are our next steps?    Please advise quickly as I am freaking out a little, as you can imagine due to what this number coupled with the newer pain and negative results on the autoimmune test numbers are making me worry.     Thanks.

## 2024-06-07 RX ORDER — TIZANIDINE 4 MG/1
4 TABLET ORAL NIGHTLY PRN
Qty: 90 TABLET | Refills: 0 | OUTPATIENT
Start: 2024-06-07

## 2024-06-10 ENCOUNTER — TELEPHONE (OUTPATIENT)
Dept: FAMILY MEDICINE CLINIC | Facility: CLINIC | Age: 54
End: 2024-06-10
Payer: COMMERCIAL

## 2024-06-10 NOTE — TELEPHONE ENCOUNTER
Incoming Refill Request      Medication requested (name and dose):   OZEMPIC 2MG    Pharmacy where request should be sent:   [facility]    Additional details provided by patient:   NONE    Best call back number:   [phone]    Does the patient have less than a 3 day supply:  [x] Yes  [] No    Renard Hunt Rep  06/10/24, 14:07 EDT           None

## 2024-06-11 RX ORDER — SEMAGLUTIDE 2.68 MG/ML
2 INJECTION, SOLUTION SUBCUTANEOUS WEEKLY
Qty: 9 ML | Refills: 1 | Status: SHIPPED | OUTPATIENT
Start: 2024-06-11

## 2024-06-14 DIAGNOSIS — G89.29 CHRONIC LOW BACK PAIN WITHOUT SCIATICA, UNSPECIFIED BACK PAIN LATERALITY: ICD-10-CM

## 2024-06-14 DIAGNOSIS — M54.50 CHRONIC LOW BACK PAIN WITHOUT SCIATICA, UNSPECIFIED BACK PAIN LATERALITY: ICD-10-CM

## 2024-06-17 RX ORDER — DICLOFENAC SODIUM 25 MG/1
25 TABLET, DELAYED RELEASE ORAL 4 TIMES DAILY
Qty: 120 TABLET | Refills: 0 | OUTPATIENT
Start: 2024-06-17

## 2024-07-31 ENCOUNTER — OFFICE VISIT (OUTPATIENT)
Dept: ONCOLOGY | Facility: CLINIC | Age: 54
End: 2024-07-31
Payer: COMMERCIAL

## 2024-07-31 VITALS
RESPIRATION RATE: 18 BRPM | WEIGHT: 168 LBS | OXYGEN SATURATION: 98 % | HEIGHT: 67 IN | SYSTOLIC BLOOD PRESSURE: 126 MMHG | TEMPERATURE: 98.2 F | DIASTOLIC BLOOD PRESSURE: 80 MMHG | HEART RATE: 96 BPM | BODY MASS INDEX: 26.37 KG/M2

## 2024-07-31 DIAGNOSIS — Z17.0 MALIGNANT NEOPLASM OF UPPER-OUTER QUADRANT OF LEFT BREAST IN FEMALE, ESTROGEN RECEPTOR POSITIVE: ICD-10-CM

## 2024-07-31 DIAGNOSIS — C50.412 MALIGNANT NEOPLASM OF UPPER-OUTER QUADRANT OF LEFT BREAST IN FEMALE, ESTROGEN RECEPTOR POSITIVE: ICD-10-CM

## 2024-07-31 DIAGNOSIS — C50.412 MALIGNANT NEOPLASM OF UPPER-OUTER QUADRANT OF LEFT BREAST IN FEMALE, ESTROGEN RECEPTOR POSITIVE: Primary | ICD-10-CM

## 2024-07-31 DIAGNOSIS — Z17.0 MALIGNANT NEOPLASM OF UPPER-OUTER QUADRANT OF LEFT BREAST IN FEMALE, ESTROGEN RECEPTOR POSITIVE: Primary | ICD-10-CM

## 2024-07-31 PROCEDURE — 99213 OFFICE O/P EST LOW 20 MIN: CPT | Performed by: NURSE PRACTITIONER

## 2024-07-31 NOTE — PROGRESS NOTES
CHIEF COMPLAINT: left hip pain    Problem List:  Oncology/Hematology History Overview Note   1.  Left breast cancer: She had breast cancer biopsied 2/6/18 by Dr. marquez showing infiltrating ductal carcinoma [place] 6 out of 9, 60% 1+ HER-2/magaly, ER greater than 95% 2+, CA 95% 2-3+.  On 3/13/18 she had lumpectomy for a 2.5 cm 0 out of one grade 2 infiltrating lobular carcinoma.  Mammaprint came back with a 10% distant risk of recurrence on hormonal blockade alone.  Because of the tumor over 2 cm in size, Dr. Tao recommended Taxotere and Cytoxan but after the second course of therapy she had a severe allergic reaction with hives.  She received 30 radiation treatments, completed 07/2018 and, given a history of DEANA without BSO for fibroids in 2012, Dr. Toya aCrrillo due to her hormonal levels and found her to be premenopausal.  Hence she was started on tamoxifen for which she has been taking gabapentin for hot flashes.  Still having a lot of dermatographia is.  Has a hiatal hernia as well as a hearing loss since that time.  She came to me for the first time 10/9/18 asking me to assume her care.  She was concerned regarding some hip pain for which she had been told in the past there was some tumor in the abdomen and seeing orthopedics for was told she did not need to do anything about it.  She also had hip injections for spinal stenosis with neurology and movable.  I asked her to get her prior bone imaging reports and discs and I'm getting a total body bone scan.  She had previous genetic testing August 2018 negative for BRCA and she is going to get those reports for rest of review what genetic test were done.  Assuming the bone scan is unremarkable and the outside imaging does not suggest any other worrisome bony abnormalities and I certainly think at least 5 years of tamoxifen is reasonable.  She will continue with the gabapentin.     Per records from Dr. Dr. Tao, she completed radiation to the left  breast 7/30/18.  Because of shortness of breath with pleurisy, CT PE protocol 5/8/18 was done and no PE found.  ENT evaluation 5/17/18 with flexible laryngoscopy showed widely patent airway and prednisone was tapered post hypersensitivity reaction to the Taxotere.  Per note from . Dr. Tao of 8/27/18, BRCA1 and BRCA2 mutation analysis was ordered.  This was done with [facility] which showed no clinically significant mutation but there was a variant of uncertain significance found.  I recommended repeating this with cancernext panel for more thorough evaluation.  According to a note from her primary care in 2015 there was mention of an MRI showing no change in a thigh lipoma which I suspect is the mass she is referring to.  I performed total body bone scan 10/23/18 which was entirely negative.  She does have a large mass in the superior lateral aspect of her left thigh which has not grown and likely lipomatous but given the size of this, I am referring her to Bob Parahm for evaluation.  She brought her discs with her on her visit to see me on 11/13/18 and she will take those to Dr. Parham and then bring them back to me for us to load into our system for the future.  In the meantime I will get her to our genetic counselors for further testing and to have her monitored over time in the event that her variant of undetermined significance becomes one that is determined to be significant.      -12/7/2018 [employer] 28 gene myRisk panel showed 2 variants of uncertain significance in BARD 1 and PMS 2    -2/4/2019 consultation Dr. Bob Parham showed left hip/thigh lipoma.  He recommended no surgery and just physical therapy for trochanteric bursitis.    -12/10/2020 follow-up with Dr. Serrano for fibromyalgia and osteoarthritis.    -7/28/2021 [facility] follow-up visit: No evidence of recurrent breast cancer on exam.  She is having some symptoms however that are concerning.  She has  worsening bone pain particularly in her lower extremities, I will get a bone scan for further evaluation.  She also has intermittent abdominal pain and nausea.  She has a persistently high hemoglobin A1c despite cutting out sugar from her diet and being very carbohydrate restricted.  I will get a CT scan of her abdomen and pelvis.  I will call her with the results of both of these.  Assuming these are negative then we will plan on seeing her back for follow-up in 6 months.  She states that she may see an endocrinologist that one of her friends has recommended for her hyperglycemia.  Hopefully the Otezla will help.     She will continue tamoxifen 10 mg twice a day, she did not need a refill.  We will get her next annual mammogram in January and I have ordered that today.  Her liver enzymes were normal on recent blood work.  We will stop tamoxifen at 5 years which is July 2023.     Return to clinic in 6 months for follow-up, after her mammogram.  If she is doing well at that time we can then go to annual follow-ups as she sees Dr. Jennings in the summer therefore we could see her in the winter.     -8/10/2021 CT abdomen pelvis with contrast showed no obstruction or other causes for abdominal pain.  Right 4 mm inferior pole nephrolithiasis.  Hepatic steatosis.  Total body bone scan done because of leg pain showed degenerative joint disease of the cervical and upper thoracic spine only minimally increased compared to January 2020 with stable low level degenerative joint disease elsewhere and no suspicious metastases.    -1/24/2022 right breast ultrasound and bilateral diagnostic mammogram [facility] BI-RADS 2 with recommended diagnostic mammogram in 12 months bilateral.      -2/1/2022 [facility] follow-up telehealth visit: Though rapid test negative, she has had some URI symptoms that had her concerned so she did not want to come in and expose her cancer patient so we did a virtual visit.  Other  than for her URI symptoms which are modest and free of fevers she has no other complaints.  She has lost a lot of weight on a keto plan.  We will continue tamoxifen through at least the summer 2023.  We will get mammogram again prior to return in a year and in the meantime I will get her breast cancer index per her request and if she has high benefit then she may be willing to put up with another couple of years of tamoxifen but if she continues to suffer from hot flashes, she is not sure if she will take extended adjuvant. Normal liver enzymes July 2021.  She will get gynecologic and physical breast exam with her gynecologist or primary care within the next 6 months.    .-2/14/2023 [facility] follow-up: Her breast cancer index predicted 6.7% risk of late distant recurrence with some benefit from extended adjuvant.  With extended adjuvant can probably reduce that a couple of percent.  10/25/2022 CT abdomen pelvis without contrast done at [facility] showed nonobstructing left lower pole 2 mm calculus otherwise unremarkable.  1/25/2023 mammogram [facility] BI-RADS 2 with repeat screening in 12 months.  Consideration for MRI given history of breast cancer prior to age 49.  Degenerative disks on lumbar spine MRI without contrast September 2022 done by primary care.  1/25/2023 CMP unremarkable including normal liver enzymes and CBC normal.  For the above BCI she does not likely want to treat with hormone blockade past the summer but does want to get the MRI of her breast which I will order for the summertime and she will see my nurse practitioner back after that.  Bilateral breast exam benign today.    -7/26/2023 [facility] follow-up: Porsha is now completing 5 years adjuvant therapy with tamoxifen and has stopped taking it.  She has opted out of extended adjuvant therapy.  She continues to deal with hot flashes.  We did refill her clonidine, hopefully after she has been off of  tamoxifen for a while longer her hot flashes will improve.  I did recommend today that she look into acupuncture as there is data showing this to be helpful.  I gave her information on the department of integrative medicine at [facility]. She recently had breast MRI screening on 7/19/2023, results are still pending.  She does her annual mammogram in [place] and that will be due in January and I have ordered that today.  I will see her back in 1 year for follow-up in survivorship mode.     Malignant neoplasm of upper-outer quadrant of left breast in female, estrogen receptor positive   2/6/2018 Initial Diagnosis    Malignant neoplasm of upper-outer quadrant of breast in female, estrogen receptor positive (CMS/HCC)     7/2/2018 -  Hormonal Therapy    Tamoxifen began after radiation     10/23/2018 Imaging    Total body bone scan     12/7/2018 Genetic Testing    Genetic testing Results were obtained from [facility] confirming that a 28 gene [employer] (P) panel had been performed, which included all high and moderate risk breast cancer genes as well as genes related to other cancer risks.  Two variants of uncertain significance (VUS) were identified, in the BARD1 and PMS2 genes.  The ClinVar database was referenced, which did not show any alternative interpretations of either VUS.          1/14/2020 Imaging    1/14/2020 mammogram BI-RADS VI with recommended unilateral follow-up left mammogram in July.     1/24/2020 Imaging     bone scan stable with no progressive abnormalities to explain left rib pain     12/10/2020 Imaging    DEXA bone density testing normal.     1/21/2021 Imaging    -1/21/2021 bilateral diagnostic mammogram BI-RADS 2.     Lipoma of left lower extremity   3/23/2015 Initial Diagnosis    Mass of left thigh     3/23/2015 Imaging    MRI left lower extremity Impression:  Abnormal left tensor fascial des muscle of uncertain significance.  That signal may represent a lipoma but is more  infiltrative and unusual.  Other neoplastic process including liposarcoma cannot be excluded.  Mild contrast enhancement and air present muscle injury, muscle inflammation or less likely, neoplastic involvement.     1/17/2019 Imaging    MRI right gluteal region showed fatty tumor posteriorly in the fascia varun     2/4/2019 -  Other Event    Consultation with Dr. Bob Parham at the [facility] for left thigh lipoma,  recommended physical therapy for her snapping hip and trocar bursitis, in regard to the tumor no plans on surgically resecting.  Follow-up PRN.         HISTORY OF PRESENT ILLNESS:  The patient is a 54 y.o. female, here for follow up on management of history of ER+ breast cancer now completing 5 years of adjuvant therapy with tamoxifen.  Overall doing okay.  She continues to deal with chronic pain.  She is having left hip pain and is scheduled for steroid injection soon.  She follows frequently with her PCP.  Arthralgias improved after being off tamoxifen but has gotten worse again.  Her primary care doctor valencia extra labs at last follow-up that were unremarkable.  She is concerned that her alkaline phosphatase has increased over the past few months.  She denies any specific new bone pain although this pain in her left hip feels different than her normal fibromyalgia pain.  She is also having issues that are similar to restless legs.  She is discussing these issues with her primary care doctor.      Past Medical History:   Diagnosis Date    Allergic     Arthritis     Breast cancer 2018    Diabetes mellitus     Disease of thyroid gland     Fibromyalgia, primary     Headache     HL (hearing loss)     Hypothyroidism     Kidney stone     Low back pain     Scoliosis     Tremor     Visual impairment      Past Surgical History:   Procedure Laterality Date    BREAST LUMPECTOMY Left 02/2018    COLONOSCOPY      EYE SURGERY      Lasix    FRACTURE SURGERY      HYSTERECTOMY      fibroid    KNEE  "ARTHROSCOPY  2004    KNEE SURGERY      x4    REFRACTIVE SURGERY      Lasik    SUBTOTAL HYSTERECTOMY         Allergies   Allergen Reactions    Codeine GI Intolerance    Morphine GI Intolerance    Hydrocodone-Acetaminophen Itching    Cytoxan [Cyclophosphamide] Hives    Fluticasone Other (See Comments)    Eliana Other (See Comments)    Taxotere [Docetaxel] Hives    Vilanterol Other (See Comments)    Zyrtec [Cetirizine] Rash       Family History and Social History reviewed and changed as necessary    REVIEW OF SYSTEM:   Chronic back pain  Left hip pain  Restless legs    PHYSICAL EXAM:  Well-developed, well-nourished healthy-appearing female in no distress  Respirations regular nonlabored  Bilateral breast exam with no masses or adenopathy    Vitals:    07/31/24 1259   BP: 126/80   Pulse: 96   Resp: 18   Temp: 98.2 °F (36.8 °C)   SpO2: 98%   Weight: 76.2 kg (168 lb)   Height: 170.2 cm (67\")       Vitals:    07/31/24 1259   PainSc: 10-Worst pain ever   PainLoc: Hip  Comment: left hip          ECOG score: 0         Vitals reviewed.  Labs available in epic reviewed at time of visit    ECOG: (0) Fully Active - Able to Carry On All Pre-disease Performance Without Restriction    Lab Results   Component Value Date    HGB 14.0 05/29/2024    HCT 42.7 05/29/2024    MCV 84 05/29/2024     05/29/2024    WBC 7.6 05/29/2024    NEUTROABS 4.5 05/29/2024    LYMPHSABS 2.4 05/29/2024    MONOSABS 0.3 05/29/2024    EOSABS 0.3 05/29/2024    BASOSABS 0.1 05/29/2024       Lab Results   Component Value Date    GLUCOSE 159 (H) 05/29/2024    BUN 17 05/29/2024    CREATININE 0.70 05/29/2024     05/29/2024    K 4.0 05/29/2024    CL 99 05/29/2024    CO2 21 05/29/2024    CALCIUM 10.0 05/29/2024    ALBUMIN 4.7 05/29/2024    BILITOT 0.3 05/29/2024    ALKPHOS 158 (H) 05/29/2024    AST 17 05/29/2024    ALT 24 05/29/2024             ASSESSMENT & PLAN:  1.  Left breast cancer: She had breast cancer biopsied 2/6/18 by Dr. marquez showing " infiltrating ductal carcinoma [place] 6 out of 9, 60% 1+ HER-2/magaly, ER greater than 95% 2+, MD 95% 2-3+.  On 3/13/18 she had lumpectomy for a 2.5 cm 0 out of one grade 2 infiltrating lobular carcinoma.  Mammaprint came back with a 10% distant risk of recurrence on hormonal blockade alone.  Because of the tumor over 2 cm in size, Dr. Tao recommended Taxotere and Cytoxan but after the second course of therapy she had a severe allergic reaction with hives.  She received 30 radiation treatments, completed 07/2018 and, given a history of DEANA without BSO for fibroids in 2012, Dr. Toya Carrillo due to her hormonal levels and found her to be premenopausal.  Hence she was started on tamoxifen for which she has been taking gabapentin for hot flashes.  Still having a lot of dermatographia is.  Has a hiatal hernia as well as a hearing loss since that time.  She came to me for the first time 10/9/18 asking me to assume her care.  She was concerned regarding some hip pain for which she had been told in the past there was some tumor in the abdomen and seeing orthopedics for was told she did not need to do anything about it.  She also had hip injections for spinal stenosis with neurology and movable.  I asked her to get her prior bone imaging reports and discs and I'm getting a total body bone scan.  She had previous genetic testing August 2018 negative for BRCA and she is going to get those reports for rest of review what genetic test were done.  Assuming the bone scan is unremarkable and the outside imaging does not suggest any other worrisome bony abnormalities and I certainly think at least 5 years of tamoxifen is reasonable.  She will continue with the gabapentin.     Per records from Dr. Dr. Tao, she completed radiation to the left breast 7/30/18.  Because of shortness of breath with pleurisy, CT PE protocol 5/8/18 was done and no PE found.  ENT evaluation 5/17/18 with flexible laryngoscopy showed widely  patent airway and prednisone was tapered post hypersensitivity reaction to the Taxotere.  Per note from . Dr. Tao of 8/27/18, BRCA1 and BRCA2 mutation analysis was ordered.  This was done with [facility] which showed no clinically significant mutation but there was a variant of uncertain significance found.  I recommended repeating this with cancernext panel for more thorough evaluation.  According to a note from her primary care in 2015 there was mention of an MRI showing no change in a thigh lipoma which I suspect is the mass she is referring to.  I performed total body bone scan 10/23/18 which was entirely negative.  She does have a large mass in the superior lateral aspect of her left thigh which has not grown and likely lipomatous but given the size of this, I am referring her to Bob Parham for evaluation.  She brought her discs with her on her visit to see me on 11/13/18 and she will take those to Dr. Parham and then bring them back to me for us to load into our system for the future.  In the meantime I will get her to our genetic counselors for further testing and to have her monitored over time in the event that her variant of undetermined significance becomes one that is determined to be significant.      -12/7/2018 [employer] 28 gene myRisk panel showed 2 variants of uncertain significance in BARD 1 and PMS 2    -2/4/2019 consultation Dr. Bob Parham showed left hip/thigh lipoma.  He recommended no surgery and just physical therapy for trochanteric bursitis.    -12/10/2020 follow-up with Dr. Serrano for fibromyalgia and osteoarthritis.    -7/28/2021 [facility] follow-up visit: No evidence of recurrent breast cancer on exam.  She is having some symptoms however that are concerning.  She has worsening bone pain particularly in her lower extremities, I will get a bone scan for further evaluation.  She also has intermittent abdominal pain and nausea.  She has a  persistently high hemoglobin A1c despite cutting out sugar from her diet and being very carbohydrate restricted.  I will get a CT scan of her abdomen and pelvis.  I will call her with the results of both of these.  Assuming these are negative then we will plan on seeing her back for follow-up in 6 months.  She states that she may see an endocrinologist that one of her friends has recommended for her hyperglycemia.  Hopefully the Otezla will help.     She will continue tamoxifen 10 mg twice a day, she did not need a refill.  We will get her next annual mammogram in January and I have ordered that today.  Her liver enzymes were normal on recent blood work.  We will stop tamoxifen at 5 years which is July 2023.     Return to clinic in 6 months for follow-up, after her mammogram.  If she is doing well at that time we can then go to annual follow-ups as she sees Dr. Jennings in the summer therefore we could see her in the winter.     -8/10/2021 CT abdomen pelvis with contrast showed no obstruction or other causes for abdominal pain.  Right 4 mm inferior pole nephrolithiasis.  Hepatic steatosis.  Total body bone scan done because of leg pain showed degenerative joint disease of the cervical and upper thoracic spine only minimally increased compared to January 2020 with stable low level degenerative joint disease elsewhere and no suspicious metastases.    -1/24/2022 right breast ultrasound and bilateral diagnostic mammogram [facility] BI-RADS 2 with recommended diagnostic mammogram in 12 months bilateral.      -2/1/2022 [facility] telehealth visit: Though rapid test negative, she has had some URI symptoms that had her concerned so she did not want to come in and expose her cancer patient so we did a virtual visit.  Other than for her URI symptoms which are modest and free of fevers she has no other complaints.  She has lost a lot of weight on a keto plan.  We will continue tamoxifen through  at least the summer 2023.  We will get mammogram again prior to return in a year and in the meantime I will get her breast cancer index per her request and if she has high benefit then she may be willing to put up with another couple of years of tamoxifen but if she continues to suffer from hot flashes, she is not sure if she will take extended adjuvant. Normal liver enzymes July 2021.  She will get gynecologic and physical breast exam with her gynecologist or primary care within the next 6 months.    .-2/14/2023 [facility] medical oncology follow-up: Her breast cancer index predicted 6.7% risk of late distant recurrence with some benefit from extended adjuvant.  With extended adjuvant can probably reduce that a couple of percent.  10/25/2022 CT abdomen pelvis without contrast done at [facility] showed nonobstructing left lower pole 2 mm calculus otherwise unremarkable.  1/25/2023 mammogram [facility] BI-RADS 2 with repeat screening in 12 months.  Consideration for MRI given history of breast cancer prior to age 49.  Degenerative disks on lumbar spine MRI without contrast September 2022 done by primary care.  1/25/2023 CMP unremarkable including normal liver enzymes and CBC normal.  For the above BCI she does not likely want to treat with hormone blockade past the summer but does want to get the MRI of her breast which I will order for the summertime and she will see my nurse practitioner back after that.  Bilateral breast exam benign today.    -7/26/2023 [facility] clinic follow-up: Porsha is now completing 5 years adjuvant therapy with tamoxifen and has stopped taking it.  She has opted out of extended adjuvant therapy.  She continues to deal with hot flashes.  We did refill her clonidine, hopefully after she has been off of tamoxifen for a while longer her hot flashes will improve.  I did recommend today that she look into acupuncture as there is data showing this to be helpful.  I gave her  information on the department of integrative medicine at [facility]. She recently had breast MRI screening on 7/19/2023, results are still pending.  She does her annual mammogram in [place] and that will be due in January and I have ordered that today.  I will see her back in 1 year for follow-up in survivorship mode.    -7/31/2024 [facility] oncology clinic follow-up: Overall doing okay.  She completed 5 years of adjuvant therapy with tamoxifen July 2023.  Hot flashes have improved.  Arthralgias improved initially but have worsened over the past couple months.  She is following with her PCP who checked extra blood work for inflammatory markers which were all normal.  She is concerned because her alkaline phosphatase has increased over the past several months.  Alkaline phosphatase was 158 in May which is increased from 139 in November 2023 and was normal at 117 and August 2023.  She is having left hip pain that is different for her and she is scheduled for a steroid injection.  She does have fibromyalgia but otherwise denies any new long bone pain.  She had mammogram in January 2024 that was normal.  We will continue annual screening, ordered today.  She had breast MRI screening in July 2023 that was unremarkable.  Patient would like to continue annual screening which I have ordered today.  I will repeat CMP today and follow-up with patient when results available.  Otherwise we will see her back in 1 year.    This was a level 3, limited MDM visit with management of stable chronic illness, prescription drug management and ordering of mammogram and breast MRI.    Magalis Barakat, APRN  07/31/2024

## 2024-08-01 LAB
ALBUMIN SERPL-MCNC: 4.5 G/DL (ref 3.5–5.2)
ALBUMIN/GLOB SERPL: 1.8 G/DL
ALP SERPL-CCNC: 131 U/L (ref 39–117)
ALT SERPL-CCNC: 15 U/L (ref 1–33)
AST SERPL-CCNC: 17 U/L (ref 1–32)
BILIRUB SERPL-MCNC: 0.3 MG/DL (ref 0–1.2)
BUN SERPL-MCNC: 12 MG/DL (ref 6–20)
BUN/CREAT SERPL: 16.2 (ref 7–25)
CALCIUM SERPL-MCNC: 10.1 MG/DL (ref 8.6–10.5)
CHLORIDE SERPL-SCNC: 102 MMOL/L (ref 98–107)
CO2 SERPL-SCNC: 25.9 MMOL/L (ref 22–29)
CREAT SERPL-MCNC: 0.74 MG/DL (ref 0.57–1)
EGFRCR SERPLBLD CKD-EPI 2021: 95.7 ML/MIN/1.73
GLOBULIN SER CALC-MCNC: 2.5 GM/DL
GLUCOSE SERPL-MCNC: 118 MG/DL (ref 65–99)
POTASSIUM SERPL-SCNC: 4.1 MMOL/L (ref 3.5–5.2)
PROT SERPL-MCNC: 7 G/DL (ref 6–8.5)
SODIUM SERPL-SCNC: 140 MMOL/L (ref 136–145)

## 2024-08-07 ENCOUNTER — HOSPITAL ENCOUNTER (OUTPATIENT)
Dept: MRI IMAGING | Facility: HOSPITAL | Age: 54
Discharge: HOME | End: 2024-08-07
Admitting: NURSE PRACTITIONER
Payer: COMMERCIAL

## 2024-08-07 DIAGNOSIS — Z17.0 MALIGNANT NEOPLASM OF UPPER-OUTER QUADRANT OF LEFT BREAST IN FEMALE, ESTROGEN RECEPTOR POSITIVE: ICD-10-CM

## 2024-08-07 DIAGNOSIS — C50.412 MALIGNANT NEOPLASM OF UPPER-OUTER QUADRANT OF LEFT BREAST IN FEMALE, ESTROGEN RECEPTOR POSITIVE: ICD-10-CM

## 2024-08-07 PROCEDURE — 0 GADOBENATE DIMEGLUMINE 529 MG/ML SOLUTION: Performed by: NURSE PRACTITIONER

## 2024-08-07 PROCEDURE — A9577 INJ MULTIHANCE: HCPCS | Performed by: NURSE PRACTITIONER

## 2024-08-07 PROCEDURE — 77049 MRI BREAST C-+ W/CAD BI: CPT

## 2024-08-07 RX ADMIN — GADOBENATE DIMEGLUMINE 15 ML: 529 INJECTION, SOLUTION INTRAVENOUS at 11:11

## 2024-08-22 ENCOUNTER — TELEPHONE (OUTPATIENT)
Dept: FAMILY MEDICINE CLINIC | Facility: CLINIC | Age: 54
End: 2024-08-22
Payer: COMMERCIAL

## 2024-08-22 DIAGNOSIS — E11.9 TYPE 2 DIABETES MELLITUS WITHOUT COMPLICATION, WITHOUT LONG-TERM CURRENT USE OF INSULIN: Primary | ICD-10-CM

## 2024-08-22 DIAGNOSIS — E03.9 ACQUIRED HYPOTHYROIDISM: ICD-10-CM

## 2024-08-22 DIAGNOSIS — E78.2 MIXED HYPERLIPIDEMIA: ICD-10-CM

## 2024-08-22 NOTE — TELEPHONE ENCOUNTER
Caller: Porsha Bolden    Relationship: Self    Best call back number: [phone]     What orders are you requesting (i.e. lab or imaging): ROUTINE FASTING LAB ORDER'S     In what timeframe would the patient need to come in: AS SOON AS POSSIBLE     Where will you receive your lab/imaging services: IN OFFICE     Additional notes:   PATIENT WOULD LIKE FOR ROUTINE FASTING LAB ORDER'S TO BE PLACED IN HER CHART TO GET DRAWN BEFORE HER SCHEDULED APPOINTMENT 08/29/2024

## 2024-10-07 DIAGNOSIS — M54.50 ACUTE LOW BACK PAIN WITHOUT SCIATICA, UNSPECIFIED BACK PAIN LATERALITY: ICD-10-CM

## 2024-10-07 RX ORDER — GABAPENTIN 600 MG/1
TABLET ORAL
Qty: 90 TABLET | Refills: 0 | Status: SHIPPED | OUTPATIENT
Start: 2024-10-07

## 2024-10-07 RX ORDER — TRAMADOL HYDROCHLORIDE 50 MG/1
50 TABLET ORAL 3 TIMES DAILY
Qty: 90 TABLET | Refills: 0 | Status: SHIPPED | OUTPATIENT
Start: 2024-10-07

## 2024-10-07 NOTE — TELEPHONE ENCOUNTER
Please review since Benson is out.    Rx Refill Note  Requested Prescriptions     Pending Prescriptions Disp Refills    traMADol (ULTRAM) 50 MG tablet [Pharmacy Med Name: TRAMADOL HCL 50MG TABS] 270 tablet 0     Sig: TAKE ONE TABLET BY MOUTH THREE TIMES A DAY    gabapentin (NEURONTIN) 600 MG tablet [Pharmacy Med Name: GABAPENTIN 600MG TABS] 270 tablet 0     Sig: TAKE ONE TABLET BY MOUTH THREE TIMES A DAY. WILL NEED APPOINTMENT FOR FURTHER REFILLS.      Last office visit with prescribing clinician: 5/29/2024   Last telemedicine visit with prescribing clinician: Visit date not found   Next office visit with prescribing clinician: 12/3/2024                         Would you like a call back once the refill request has been completed: [] Yes [] No    If the office needs to give you a call back, can they leave a voicemail: [] Yes [] No    April Aguilar MA  10/07/24, 15:37 EDT

## 2024-10-16 RX ORDER — SEMAGLUTIDE 2.68 MG/ML
INJECTION, SOLUTION SUBCUTANEOUS
Qty: 9 ML | Refills: 1 | Status: SHIPPED | OUTPATIENT
Start: 2024-10-16

## 2024-10-20 DIAGNOSIS — M54.50 ACUTE LOW BACK PAIN WITHOUT SCIATICA, UNSPECIFIED BACK PAIN LATERALITY: ICD-10-CM

## 2024-10-21 RX ORDER — CYCLOBENZAPRINE HCL 10 MG
10 TABLET ORAL 2 TIMES DAILY PRN
Qty: 60 TABLET | Refills: 5 | Status: SHIPPED | OUTPATIENT
Start: 2024-10-21

## 2024-11-27 ENCOUNTER — LAB (OUTPATIENT)
Dept: FAMILY MEDICINE CLINIC | Facility: CLINIC | Age: 54
End: 2024-11-27
Payer: COMMERCIAL

## 2024-11-28 LAB
ALBUMIN SERPL-MCNC: 4.4 G/DL (ref 3.8–4.9)
ALP SERPL-CCNC: 126 IU/L (ref 44–121)
ALT SERPL-CCNC: 17 IU/L (ref 0–32)
AST SERPL-CCNC: 14 IU/L (ref 0–40)
BASOPHILS # BLD AUTO: 0.1 X10E3/UL (ref 0–0.2)
BASOPHILS NFR BLD AUTO: 1 %
BILIRUB SERPL-MCNC: 0.4 MG/DL (ref 0–1.2)
BUN SERPL-MCNC: 17 MG/DL (ref 6–24)
BUN/CREAT SERPL: 22 (ref 9–23)
CALCIUM SERPL-MCNC: 10.4 MG/DL (ref 8.7–10.2)
CHLORIDE SERPL-SCNC: 100 MMOL/L (ref 96–106)
CHOLEST SERPL-MCNC: 200 MG/DL (ref 100–199)
CO2 SERPL-SCNC: 24 MMOL/L (ref 20–29)
CREAT SERPL-MCNC: 0.79 MG/DL (ref 0.57–1)
EGFRCR SERPLBLD CKD-EPI 2021: 88 ML/MIN/1.73
EOSINOPHIL # BLD AUTO: 0.2 X10E3/UL (ref 0–0.4)
EOSINOPHIL NFR BLD AUTO: 3 %
ERYTHROCYTE [DISTWIDTH] IN BLOOD BY AUTOMATED COUNT: 14.3 % (ref 11.7–15.4)
GLOBULIN SER CALC-MCNC: 2.3 G/DL (ref 1.5–4.5)
GLUCOSE SERPL-MCNC: 186 MG/DL (ref 70–99)
HBA1C MFR BLD: 8.3 % (ref 4.8–5.6)
HCT VFR BLD AUTO: 42.1 % (ref 34–46.6)
HDLC SERPL-MCNC: 47 MG/DL
HGB BLD-MCNC: 13.4 G/DL (ref 11.1–15.9)
IMM GRANULOCYTES # BLD AUTO: 0 X10E3/UL (ref 0–0.1)
IMM GRANULOCYTES NFR BLD AUTO: 0 %
LDLC SERPL CALC-MCNC: 122 MG/DL (ref 0–99)
LYMPHOCYTES # BLD AUTO: 2.1 X10E3/UL (ref 0.7–3.1)
LYMPHOCYTES NFR BLD AUTO: 28 %
MCH RBC QN AUTO: 27.2 PG (ref 26.6–33)
MCHC RBC AUTO-ENTMCNC: 31.8 G/DL (ref 31.5–35.7)
MCV RBC AUTO: 86 FL (ref 79–97)
MONOCYTES # BLD AUTO: 0.5 X10E3/UL (ref 0.1–0.9)
MONOCYTES NFR BLD AUTO: 6 %
NEUTROPHILS # BLD AUTO: 4.7 X10E3/UL (ref 1.4–7)
NEUTROPHILS NFR BLD AUTO: 62 %
PLATELET # BLD AUTO: 310 X10E3/UL (ref 150–450)
POTASSIUM SERPL-SCNC: 3.9 MMOL/L (ref 3.5–5.2)
PROT SERPL-MCNC: 6.7 G/DL (ref 6–8.5)
RBC # BLD AUTO: 4.92 X10E6/UL (ref 3.77–5.28)
SODIUM SERPL-SCNC: 139 MMOL/L (ref 134–144)
TRIGL SERPL-MCNC: 175 MG/DL (ref 0–149)
TSH SERPL DL<=0.005 MIU/L-ACNC: 3.09 UIU/ML (ref 0.45–4.5)
VLDLC SERPL CALC-MCNC: 31 MG/DL (ref 5–40)
WBC # BLD AUTO: 7.6 X10E3/UL (ref 3.4–10.8)

## 2024-12-03 ENCOUNTER — OFFICE VISIT (OUTPATIENT)
Dept: FAMILY MEDICINE CLINIC | Facility: CLINIC | Age: 54
End: 2024-12-03
Payer: COMMERCIAL

## 2024-12-03 VITALS
SYSTOLIC BLOOD PRESSURE: 118 MMHG | WEIGHT: 172 LBS | DIASTOLIC BLOOD PRESSURE: 74 MMHG | HEIGHT: 67 IN | BODY MASS INDEX: 27 KG/M2 | HEART RATE: 68 BPM

## 2024-12-03 DIAGNOSIS — M54.50 ACUTE LOW BACK PAIN WITHOUT SCIATICA, UNSPECIFIED BACK PAIN LATERALITY: ICD-10-CM

## 2024-12-03 DIAGNOSIS — G47.33 OSA (OBSTRUCTIVE SLEEP APNEA): Primary | ICD-10-CM

## 2024-12-03 DIAGNOSIS — E11.9 TYPE 2 DIABETES MELLITUS WITHOUT COMPLICATION, WITHOUT LONG-TERM CURRENT USE OF INSULIN: ICD-10-CM

## 2024-12-03 DIAGNOSIS — Z23 FLU VACCINE NEED: ICD-10-CM

## 2024-12-03 DIAGNOSIS — G43.809 OTHER MIGRAINE WITHOUT STATUS MIGRAINOSUS, NOT INTRACTABLE: ICD-10-CM

## 2024-12-03 DIAGNOSIS — K21.9 GASTROESOPHAGEAL REFLUX DISEASE WITHOUT ESOPHAGITIS: ICD-10-CM

## 2024-12-03 PROCEDURE — 90656 IIV3 VACC NO PRSV 0.5 ML IM: CPT | Performed by: FAMILY MEDICINE

## 2024-12-03 PROCEDURE — 90471 IMMUNIZATION ADMIN: CPT | Performed by: FAMILY MEDICINE

## 2024-12-03 PROCEDURE — 99214 OFFICE O/P EST MOD 30 MIN: CPT | Performed by: FAMILY MEDICINE

## 2024-12-03 RX ORDER — CYCLOBENZAPRINE HCL 10 MG
10 TABLET ORAL 2 TIMES DAILY PRN
Qty: 180 TABLET | Refills: 1 | Status: SHIPPED | OUTPATIENT
Start: 2024-12-03

## 2024-12-03 RX ORDER — TRAMADOL HYDROCHLORIDE 50 MG/1
50 TABLET ORAL 3 TIMES DAILY
Qty: 270 TABLET | Refills: 1 | Status: SHIPPED | OUTPATIENT
Start: 2024-12-03

## 2024-12-03 RX ORDER — FAMOTIDINE 20 MG/1
20 TABLET, FILM COATED ORAL DAILY
Qty: 90 TABLET | Refills: 1 | Status: SHIPPED | OUTPATIENT
Start: 2024-12-03

## 2024-12-03 RX ORDER — DRONABINOL 5 MG/1
5 CAPSULE ORAL
COMMUNITY
Start: 2024-01-01 | End: 2024-12-14

## 2024-12-03 RX ORDER — GABAPENTIN 600 MG/1
600 TABLET ORAL 3 TIMES DAILY
Qty: 270 TABLET | Refills: 1 | Status: SHIPPED | OUTPATIENT
Start: 2024-12-03

## 2024-12-03 RX ORDER — PANTOPRAZOLE SODIUM 40 MG/1
40 TABLET, DELAYED RELEASE ORAL DAILY
Qty: 90 TABLET | Refills: 1 | Status: SHIPPED | OUTPATIENT
Start: 2024-12-03

## 2024-12-03 RX ORDER — SUMATRIPTAN SUCCINATE 100 MG/1
TABLET ORAL
Qty: 9 TABLET | Refills: 3 | Status: SHIPPED | OUTPATIENT
Start: 2024-12-03

## 2024-12-04 NOTE — PROGRESS NOTES
Follow Up Office Visit      Date of Visit:  2024   Patient Name: Porsha Bolden  : 1970   MRN: [id number]     Chief Complaint:    Chief Complaint   Patient presents with    Follow-up     6 month follow up        History of Present Illness: Porsha Bolden is a 54 y.o. female who is here today for follow up.  Multiple issues discussed.    Patient having issues with sleep apnea as well as finding appropriate machine and mask.  Try to review records from .  They are requesting echocardiogram to be done.  Has been seen also by ENT in [place].    Patient also with uncontrolled diabetes.  Occasionally has to get epidural steroid injections due to musculoskeletal issues.  Current regimen is maximized.    Patient also needs refills on current medications takes for her migraines low back pain and GERD.  History of Present Illness        Subjective      Review of Systems:   Review of Systems   Constitutional:  Positive for fatigue.   Gastrointestinal:  Positive for abdominal pain.   Genitourinary:  Positive for pelvic pain.   Musculoskeletal:  Positive for back pain.   Neurological:  Positive for weakness.   Psychiatric/Behavioral:  Negative for stress.        Past Medical History:   Past Medical History:   Diagnosis Date    Allergic     Arthritis     Asthma     Breast cancer 2018    Diabetes mellitus     Disease of thyroid gland     Fibromyalgia, primary     GERD (gastroesophageal reflux disease)     Headache     HL (hearing loss)     Hypothyroidism     Kidney stone     Low back pain     Scoliosis     Tremor     Visual impairment        Past Surgical History:   Past Surgical History:   Procedure Laterality Date    BREAST LUMPECTOMY Left 2018    COLONOSCOPY      EYE SURGERY      Lasix    FRACTURE SURGERY      HYSTERECTOMY      fibroid    KNEE ARTHROSCOPY  2004    KNEE SURGERY      x4    LYMPH NODE BIOPSY      REFRACTIVE SURGERY      Lasik    SUBTOTAL HYSTERECTOMY         Family  History:   Family History   Problem Relation Age of Onset    Uterine cancer Mother     Arthritis Mother     Cancer Mother     Hearing loss Mother     Heart disease Mother     Hyperlipidemia Mother     Stroke Mother     Vision loss Mother     Lung cancer Father     Brain cancer Father 69    Cancer Father     Hearing loss Father     Breast cancer Sister     Arthritis Sister     Cancer Sister     Thyroid disease Sister     Brain cancer Paternal Grandmother     Heart disease Sister     Kidney disease Maternal Grandfather     Kidney disease Maternal Uncle        Social History:   Social History     Socioeconomic History    Marital status:    Tobacco Use    Smoking status: Never    Smokeless tobacco: Never   Vaping Use    Vaping status: Never Used   Substance and Sexual Activity    Alcohol use: Not Currently     Comment: twice per year    Drug use: Never    Sexual activity: Yes     Partners: Male     Birth control/protection: Vasectomy, Hysterectomy     Comment: Only with  of 27 years       Medications:     Current Outpatient Medications:     cyclobenzaprine (FLEXERIL) 10 MG tablet, Take 1 tablet by mouth 2 (Two) Times a Day As Needed for Muscle Spasms., Disp: 180 tablet, Rfl: 1    dronabinol (MARINOL) 5 MG capsule, 1 capsule., Disp: , Rfl:     empagliflozin (Jardiance) 25 MG tablet tablet, Take 1 tablet by mouth Every Morning., Disp: 90 tablet, Rfl: 1    escitalopram (LEXAPRO) 10 MG tablet, Take 1 tablet by mouth Daily., Disp: 90 tablet, Rfl: 3    famotidine (PEPCID) 20 MG tablet, Take 1 tablet by mouth Daily., Disp: 90 tablet, Rfl: 1    gabapentin (NEURONTIN) 600 MG tablet, Take 1 tablet by mouth 3 (Three) Times a Day., Disp: 270 tablet, Rfl: 1    levocetirizine (XYZAL) 5 MG tablet, Take 1 tablet by mouth Every Evening., Disp: , Rfl:     metFORMIN (GLUCOPHAGE) 1000 MG tablet, Take 1 tablet by mouth Daily With Breakfast & Dinner., Disp: 180 tablet, Rfl: 1    mometasone (ELOCON) 0.1 % cream, Apply 1  "Application topically to the appropriate area as directed., Disp: , Rfl:     Ozempic, 2 MG/DOSE, 8 MG/3ML solution pen-injector, INJECT 2 MG UNDER THE SKIN ONCE WEEKLY, Disp: 9 mL, Rfl: 1    pantoprazole (PROTONIX) 40 MG EC tablet, Take 1 tablet by mouth Daily., Disp: 90 tablet, Rfl: 1    SUMAtriptan (Imitrex) 100 MG tablet, Take one tablet at onset of headache. May repeat dose one time in 2 hours if headache not relieved., Disp: 9 tablet, Rfl: 3    traMADol (ULTRAM) 50 MG tablet, Take 1 tablet by mouth 3 (Three) Times a Day., Disp: 270 tablet, Rfl: 1    triamcinolone (KENALOG) 0.1 % ointment, Apply To Affected Area As Needed, Disp: , Rfl:     Allergies:   Allergies   Allergen Reactions    Codeine GI Intolerance    Morphine GI Intolerance    Hydrocodone-Acetaminophen Itching    Cytoxan [Cyclophosphamide] Hives    Fluticasone Other (See Comments)    Ginger Other (See Comments)    Taxotere [Docetaxel] Hives    Vilanterol Other (See Comments)    Zyrtec [Cetirizine] Rash       Objective     Physical Exam:  Vital Signs:   Vitals:    12/03/24 1332   BP: 118/74   Pulse: 68   Weight: 78 kg (172 lb)   Height: 170.2 cm (67\")     Body mass index is 26.94 kg/m².     Physical Exam  Vitals and nursing note reviewed.   Constitutional:       General: She is not in acute distress.     Appearance: Normal appearance. She is not ill-appearing.   HENT:      Head: Normocephalic and atraumatic.      Right Ear: Tympanic membrane and ear canal normal.      Left Ear: Tympanic membrane and ear canal normal.      Nose: Nose normal.   Cardiovascular:      Rate and Rhythm: Normal rate and regular rhythm.      Heart sounds: Normal heart sounds.   Pulmonary:      Effort: Pulmonary effort is normal.      Breath sounds: Normal breath sounds.   Neurological:      Mental Status: She is alert and oriented to person, place, and time. Mental status is at baseline.   Psychiatric:         Mood and Affect: Mood normal.       Physical " Exam      Procedures    Results    Assessment / Plan      Assessment/Plan:   Diagnoses and all orders for this visit:    1. TALHA (obstructive sleep apnea) (Primary)  -     Adult Transthoracic Echo Complete W/ Cont if Necessary Per Protocol; Future    2. Type 2 diabetes mellitus without complication, without long-term current use of insulin  -     Ambulatory Referral to Nutrition Services    3. Other migraine without status migrainosus, not intractable  -     SUMAtriptan (Imitrex) 100 MG tablet; Take one tablet at onset of headache. May repeat dose one time in 2 hours if headache not relieved.  Dispense: 9 tablet; Refill: 3    4. Acute low back pain without sciatica, unspecified back pain laterality  Comments:  Continue current medications for low back pain.  Continue to see specialist.  Jack report appropriate. medication refills given.  Orders:  -     cyclobenzaprine (FLEXERIL) 10 MG tablet; Take 1 tablet by mouth 2 (Two) Times a Day As Needed for Muscle Spasms.  Dispense: 180 tablet; Refill: 1  -     traMADol (ULTRAM) 50 MG tablet; Take 1 tablet by mouth 3 (Three) Times a Day.  Dispense: 270 tablet; Refill: 1  -     gabapentin (NEURONTIN) 600 MG tablet; Take 1 tablet by mouth 3 (Three) Times a Day.  Dispense: 270 tablet; Refill: 1    5. Gastroesophageal reflux disease without esophagitis  -     famotidine (PEPCID) 20 MG tablet; Take 1 tablet by mouth Daily.  Dispense: 90 tablet; Refill: 1    6. Acute low back pain without sciatica, unspecified back pain laterality  -     cyclobenzaprine (FLEXERIL) 10 MG tablet; Take 1 tablet by mouth 2 (Two) Times a Day As Needed for Muscle Spasms.  Dispense: 180 tablet; Refill: 1  -     traMADol (ULTRAM) 50 MG tablet; Take 1 tablet by mouth 3 (Three) Times a Day.  Dispense: 270 tablet; Refill: 1  -     gabapentin (NEURONTIN) 600 MG tablet; Take 1 tablet by mouth 3 (Three) Times a Day.  Dispense: 270 tablet; Refill: 1    7. Flu vaccine need  -     Fluzone >6mos  ([id number])    Other orders  -     empagliflozin (Jardiance) 25 MG tablet tablet; Take 1 tablet by mouth Every Morning.  Dispense: 90 tablet; Refill: 1  -     pantoprazole (PROTONIX) 40 MG EC tablet; Take 1 tablet by mouth Daily.  Dispense: 90 tablet; Refill: 1  -     metFORMIN (GLUCOPHAGE) 1000 MG tablet; Take 1 tablet by mouth Daily With Breakfast & Dinner.  Dispense: 180 tablet; Refill: 1       Assessment & Plan      Echocardiogram ordered for further evaluation of her obstructive sleep apnea complications.    Long discussion on current diabetic care.  Nutrition consult.    Reflux medication refilled.    Reviewed recent blood work.  Continue specialist care.    Follow Up:   No follow-ups on file.    Jason Benson  [facility]     Patient or patient representative verbalized consent for the use of Ambient Listening during the visit with  Jason Benson MD for chart documentation. 12/3/2024  20:34 EST

## 2025-01-22 DIAGNOSIS — Q23.81 BICUSPID AORTIC VALVE DETERMINED BY IMAGING: ICD-10-CM

## 2025-01-22 DIAGNOSIS — G47.33 OSA (OBSTRUCTIVE SLEEP APNEA): Primary | ICD-10-CM

## 2025-01-23 ENCOUNTER — TELEPHONE (OUTPATIENT)
Dept: FAMILY MEDICINE CLINIC | Facility: CLINIC | Age: 55
End: 2025-01-23
Payer: COMMERCIAL

## 2025-01-23 NOTE — TELEPHONE ENCOUNTER
Pt informed and voiced understanding.   .  ----- Message from Jason Benson sent at 1/22/2025  8:46 PM EST -----  Let her know that I did send her a letter through [employer] so it we will discuss the echocardiogram that she had this past month.  I do not feel there is any issues greatly affecting her sleep apnea but they question the structure of 1 particular valve and thought it needed a little further review.  They would like for her to see them to discuss the echocardiogram.  Is not an emergent issue but something that does need to be evaluated a bit further.  I will put orders in for an appointment.  Please let her know.

## 2025-01-30 ENCOUNTER — OFFICE VISIT (OUTPATIENT)
Dept: CARDIOLOGY | Facility: CLINIC | Age: 55
End: 2025-01-30
Payer: COMMERCIAL

## 2025-01-30 VITALS
BODY MASS INDEX: 27.31 KG/M2 | RESPIRATION RATE: 18 BRPM | HEART RATE: 74 BPM | OXYGEN SATURATION: 99 % | SYSTOLIC BLOOD PRESSURE: 128 MMHG | HEIGHT: 67 IN | TEMPERATURE: 98 F | DIASTOLIC BLOOD PRESSURE: 74 MMHG | WEIGHT: 174 LBS

## 2025-01-30 DIAGNOSIS — Q23.81 BICUSPID AORTIC VALVE: ICD-10-CM

## 2025-01-30 DIAGNOSIS — I77.810 MILD DILATION OF ASCENDING AORTA: ICD-10-CM

## 2025-01-30 DIAGNOSIS — R06.02 SHORTNESS OF BREATH: Primary | ICD-10-CM

## 2025-01-30 RX ORDER — DRONABINOL 5 MG/1
5 CAPSULE ORAL EVERY 24 HOURS
COMMUNITY
Start: 2024-07-01 | End: 2025-02-19

## 2025-01-30 NOTE — PROGRESS NOTES
Venipuncture Blood Specimen Collection  Venipuncture performed in clinic by Nevaeh Boles RN with good hemostasis. Patient tolerated the procedure well without complications.   01/30/25   Nevaeh Boles RN

## 2025-01-31 ENCOUNTER — TELEPHONE (OUTPATIENT)
Dept: CARDIOLOGY | Facility: CLINIC | Age: 55
End: 2025-01-31
Payer: COMMERCIAL

## 2025-01-31 LAB
BUN SERPL-MCNC: 15 MG/DL (ref 6–24)
BUN/CREAT SERPL: 25 (ref 9–23)
CALCIUM SERPL-MCNC: 9.8 MG/DL (ref 8.7–10.2)
CHLORIDE SERPL-SCNC: 99 MMOL/L (ref 96–106)
CO2 SERPL-SCNC: 22 MMOL/L (ref 20–29)
CREAT SERPL-MCNC: 0.6 MG/DL (ref 0.57–1)
EGFRCR SERPLBLD CKD-EPI 2021: 106 ML/MIN/1.73
GLUCOSE SERPL-MCNC: 190 MG/DL (ref 70–99)
NT-PROBNP SERPL-MCNC: 64 PG/ML (ref 0–287)
POTASSIUM SERPL-SCNC: 4 MMOL/L (ref 3.5–5.2)
SODIUM SERPL-SCNC: 139 MMOL/L (ref 134–144)

## 2025-01-31 NOTE — PROGRESS NOTES
[facility] [place]  [facility] CARDIOLOGY  [address] [place] [address]  Dept: [phone]  Dept Fax: [phone]    Porsha Bolden  1970    New Patient Office Note    History of Present Illness:  Porsha Bolden is a 54 y.o. female who presents to the clinic for Establish Care.for abnormal echo=She is 54 years old, has been feeling fatigue and tiered and some questionable SOB, has TALHA and has seen a sleep doctor who recommend to get an echo, the echo was done and showed abnormal aortic valve possible bicuspid valve with trivial AI and ascending aorta dilated, so she is here to be evaluated, she has diabetes, and also history of breast cancer, s.p lumpectomy left side and also chemo and RXT and also Hypothyroidism, she denies any CP, edema, palpitations, her cardiac evaluation seems normal,, I do not really heart a click or murmur, her BP is 135.80 she has diabetes and we might need to add ace or ARB, her Ldl is also elevated and we should treat this specially she is diabetic and possible has bicuspid valve, , will get a CTA aorta as the ascending aorta seems somewhat dilated 2.6 cm, will also get aBNP as she mention some fatigue and Sob,she also mention that 2 sisters has probably congenital heart disease but she is not sure the real diagnostic.    The following portions of the patient's history were reviewed and updated as appropriate: allergies, current medications, past family history, past medical history, past social history, past surgical history, and problem list.    Medications:  cyclobenzaprine  dronabinol  empagliflozin tablet  escitalopram  famotidine  gabapentin  levocetirizine  metFORMIN  mometasone  Ozempic (2 MG/DOSE) solution pen-injector  pantoprazole  SUMAtriptan  traMADol  triamcinolone    Subjective  Allergies   Allergen Reactions    Codeine GI Intolerance    Morphine GI Intolerance    Hydrocodone-Acetaminophen Itching    Cytoxan  [Cyclophosphamide] Hives    Fluticasone Other (See Comments)    Eliana Other (See Comments)    Taxotere [Docetaxel] Hives    Vilanterol Other (See Comments)    Zyrtec [Cetirizine] Rash        Past Medical History:   Diagnosis Date    Allergic     Arthritis     Asthma     Breast cancer 2018    Diabetes mellitus     Disease of thyroid gland     Fibromyalgia, primary     GERD (gastroesophageal reflux disease)     Headache     HL (hearing loss)     Hypothyroidism     Kidney stone     Low back pain     Scoliosis     Tremor     Visual impairment        Past Surgical History:   Procedure Laterality Date    BREAST LUMPECTOMY Left 02/2018    COLONOSCOPY      EYE SURGERY      Lasix    FRACTURE SURGERY      HYSTERECTOMY      fibroid    KNEE ARTHROSCOPY  2004    KNEE SURGERY      x4    LYMPH NODE BIOPSY      REFRACTIVE SURGERY      Lasik    SUBTOTAL HYSTERECTOMY         Family History   Problem Relation Age of Onset    Uterine cancer Mother     Arthritis Mother     Cancer Mother     Hearing loss Mother     Heart disease Mother     Hyperlipidemia Mother     Stroke Mother     Vision loss Mother     Lung cancer Father     Brain cancer Father 69    Cancer Father     Hearing loss Father     Breast cancer Sister     Arthritis Sister     Cancer Sister     Thyroid disease Sister     Brain cancer Paternal Grandmother     Heart disease Sister     Kidney disease Maternal Grandfather     Kidney disease Maternal Uncle         Social History     Socioeconomic History    Marital status:    Tobacco Use    Smoking status: Never    Smokeless tobacco: Never   Vaping Use    Vaping status: Never Used   Substance and Sexual Activity    Alcohol use: Not Currently     Comment: twice per year    Drug use: Never    Sexual activity: Yes     Partners: Male     Birth control/protection: Vasectomy, Hysterectomy     Comment: Only with  of 27 years       Review of Systems   Constitutional: Negative.    HENT: Negative.     Respiratory: Negative.   "   Cardiovascular: Negative.    Endocrine: Negative.    Genitourinary: Negative.    Musculoskeletal: Negative.    Skin: Negative.    Allergic/Immunologic: Negative.    Neurological: Negative.    Hematological: Negative.    Psychiatric/Behavioral: Negative.         Cardiovascular Procedures    ECHO/MUGA:  STRESS TESTS:   CARDIAC CATH:   DEVICES:   HOLTER:   CT/MRI:   VASCULAR:   CARDIOTHORACIC:     Objective  Vitals:    01/30/25 1425   BP: 128/74   BP Location: Right arm   Patient Position: Lying   Cuff Size: Adult   Pulse: 74   Resp: 18   Temp: 98 °F (36.7 °C)   TempSrc: Infrared   SpO2: 99%   Weight: 78.9 kg (174 lb)   Height: 170.2 cm (67\")   PainSc: 0-No pain       Physical Exam  Vitals reviewed.   Constitutional:       Appearance: Healthy appearance. Not in distress.   Neck:      Vascular: No JVR. JVD normal.   Pulmonary:      Effort: Pulmonary effort is normal.      Breath sounds: Normal breath sounds. No wheezing. No rhonchi. No rales.   Chest:      Chest wall: Not tender to palpatation.   Cardiovascular:      PMI at left midclavicular line. Normal rate. Regular rhythm. Normal S1. Normal S2.       Murmurs: There is no murmur.      No gallop.  No click. No rub.   Pulses:     Intact distal pulses.   Edema:     Peripheral edema absent.   Abdominal:      General: Bowel sounds are normal.      Palpations: Abdomen is soft.      Tenderness: There is no abdominal tenderness.   Musculoskeletal: Normal range of motion.         General: No tenderness. Skin:     General: Skin is warm and dry.   Neurological:      General: No focal deficit present.      Mental Status: Alert and oriented to person, place and time.        Diagnostic Data    ECG 12 Lead    Date/Time: 1/30/2025 8:46 PM  Performed by: Joni Forbes MD    Authorized by: Joni Forbes MD  Comparison: compared with previous ECG from 5/15/2019  Similar to previous ECG  Rhythm: sinus rhythm  Rate: normal  BPM: 86  QRS axis: normal  Other findings: " low voltage    Clinical impression: normal ECG        Assessment and Plan  Diagnoses and all orders for this visit:    Shortness of breath- will get a BNP,  -     proBNP  -     CT Angiogram Chest  -     Basic Metabolic Panel    Bicuspid aortic valve- Possible by echo, has also mildly dilation of the ascending aorta, will get a CTA chest   -     CT Angiogram Chest  -     Basic Metabolic Panel.  Hypercholesterolemia- _ Ldl is 124 , we should start statin  Dilation of the ascending aorta- Will get a CTA aorta    Other orders  -     dronabinol (MARINOL) 5 MG capsule; Take 1 capsule by mouth Daily.        Return in about 1 year (around 1/30/2026) for Recheck with Dr. Forbes.    Joni Forbes MD  01/30/2025

## 2025-01-31 NOTE — TELEPHONE ENCOUNTER
----- Message from Joni Forbes sent at 1/31/2025  4:21 PM EST -----  Kidney test and BNP are normal. Hub can tell pt

## 2025-02-11 ENCOUNTER — OFFICE VISIT (OUTPATIENT)
Dept: FAMILY MEDICINE CLINIC | Facility: CLINIC | Age: 55
End: 2025-02-11
Payer: COMMERCIAL

## 2025-02-11 VITALS
BODY MASS INDEX: 26.53 KG/M2 | DIASTOLIC BLOOD PRESSURE: 58 MMHG | OXYGEN SATURATION: 98 % | WEIGHT: 169 LBS | HEIGHT: 67 IN | HEART RATE: 101 BPM | SYSTOLIC BLOOD PRESSURE: 118 MMHG

## 2025-02-11 DIAGNOSIS — E03.9 ACQUIRED HYPOTHYROIDISM: ICD-10-CM

## 2025-02-11 DIAGNOSIS — M19.90 ARTHRITIS: ICD-10-CM

## 2025-02-11 DIAGNOSIS — Q23.81 BICUSPID AORTIC VALVE DETERMINED BY IMAGING: ICD-10-CM

## 2025-02-11 DIAGNOSIS — K21.9 GASTROESOPHAGEAL REFLUX DISEASE WITHOUT ESOPHAGITIS: ICD-10-CM

## 2025-02-11 DIAGNOSIS — M54.50 CHRONIC LOW BACK PAIN WITHOUT SCIATICA, UNSPECIFIED BACK PAIN LATERALITY: ICD-10-CM

## 2025-02-11 DIAGNOSIS — E11.9 TYPE 2 DIABETES MELLITUS WITHOUT COMPLICATION, WITHOUT LONG-TERM CURRENT USE OF INSULIN: ICD-10-CM

## 2025-02-11 DIAGNOSIS — G47.33 OSA (OBSTRUCTIVE SLEEP APNEA): ICD-10-CM

## 2025-02-11 DIAGNOSIS — E78.2 MIXED HYPERLIPIDEMIA: ICD-10-CM

## 2025-02-11 DIAGNOSIS — G89.29 CHRONIC LOW BACK PAIN WITHOUT SCIATICA, UNSPECIFIED BACK PAIN LATERALITY: ICD-10-CM

## 2025-02-11 DIAGNOSIS — Z00.00 ROUTINE GENERAL MEDICAL EXAMINATION AT A HEALTH CARE FACILITY: Primary | ICD-10-CM

## 2025-02-11 LAB
EXPIRATION DATE: ABNORMAL
HBA1C MFR BLD: 9.3 % (ref 4.5–5.7)
Lab: ABNORMAL

## 2025-02-11 PROCEDURE — 83036 HEMOGLOBIN GLYCOSYLATED A1C: CPT | Performed by: FAMILY MEDICINE

## 2025-02-11 PROCEDURE — 90471 IMMUNIZATION ADMIN: CPT | Performed by: FAMILY MEDICINE

## 2025-02-11 PROCEDURE — 99396 PREV VISIT EST AGE 40-64: CPT | Performed by: FAMILY MEDICINE

## 2025-02-11 PROCEDURE — 90750 HZV VACC RECOMBINANT IM: CPT | Performed by: FAMILY MEDICINE

## 2025-02-11 RX ORDER — FAMOTIDINE 40 MG/1
40 TABLET, FILM COATED ORAL DAILY
Qty: 90 TABLET | Refills: 1 | Status: SHIPPED | OUTPATIENT
Start: 2025-02-11

## 2025-02-11 RX ORDER — PEN NEEDLE, DIABETIC 30 GX3/16"
1 NEEDLE, DISPOSABLE MISCELLANEOUS DAILY
Qty: 90 EACH | Refills: 1 | Status: SHIPPED | OUTPATIENT
Start: 2025-02-11

## 2025-02-11 RX ORDER — PEN NEEDLE, DIABETIC 30 GX3/16"
1 NEEDLE, DISPOSABLE MISCELLANEOUS DAILY
Qty: 90 EACH | Refills: 1 | Status: SHIPPED | OUTPATIENT
Start: 2025-02-11 | End: 2025-02-11

## 2025-02-11 NOTE — LETTER
[facility]  Vaccine Consent Form    Patient Name:  Porsha Bolden  Patient :  1970     Vaccine(s) Ordered    Shingrix Vaccine        Screening Checklist  The following questions should be completed prior to vaccination. If you answer “yes” to any question, it does not necessarily mean you should not be vaccinated. It just means we may need to clarify or ask more questions. If a question is unclear, please ask your healthcare provider to explain it.    Yes No   Any fever or moderate to severe illness today (mild illness and/or antibiotic treatment are not contraindications)?     Do you have a history of a serious reaction to any previous vaccinations, such as anaphylaxis, encephalopathy within 7 days, Guillain-[place] syndrome within 6 weeks, seizure?     Have you received any live vaccine(s) (e.g MMR, JASON) or any other vaccines in the last month (to ensure duplicate doses aren't given)?     Do you have an anaphylactic allergy to latex (DTaP, DTaP-IPV, Hep A, Hep B, MenB, RV, Td, Tdap), baker’s yeast (Hep B, HPV), polysorbates (RSV, nirsevimab, PCV 20, Rotavirrus, Tdap, Shingrix), or gelatin (JASON, MMR)?     Do you have an anaphylactic allergy to neomycin (Rabies, JASON, MMR, IPV, Hep A), polymyxin B (IPV), or streptomycin (IPV)?      Any cancer, leukemia, AIDS, or other immune system disorder? (JASON, MMR, RV)     Do you have a parent, brother, or sister with an immune system problem (if immune competence of vaccine recipient clinically verified, can proceed)? (MMR, JASON)     Any recent steroid treatments for >2 weeks, chemotherapy, or radiation treatment? (JASON, MMR)     Have you received antibody-containing blood transfusions or IVIG in the past 11 months (recommended interval is dependent on product)? (MMR, JASON)     Have you taken antiviral drugs (acyclovir, famciclovir, valacyclovir for JASON) in the last 24 or 48 hours, respectively?      Are you pregnant or planning to become pregnant within 1 month?  "(JASON, MMR, HPV, IPV, MenB, Abrexvy; For Hep B- refer to Engerix-B; For RSV - Abrysvo is indicated for 32-36 weeks of pregnancy from September to January)     For infants, have you ever been told your child has had intussusception or a medical emergency involving obstruction of the intestine (Rotavirus)? If not for an infant, can skip this question.         *Ordering Physicians/APC should be consulted if \"yes\" is checked by the patient or guardian above.  I have received, read, and understand the Vaccine Information Statement (VIS) for each vaccine ordered.  I have considered my or my child's health status as well as the health status of my close contacts.  I have taken the opportunity to discuss my vaccine questions with my or my child's health care provider.   I have requested that the ordered vaccine(s) be given to me or my child.  I understand the benefits and risks of the vaccines.  I understand that I should remain in the clinic for 15 minutes after receiving the vaccine(s).  _________________________________________________________  Signature of Patient or Parent/Legal Guardian ____________________  Date     "

## 2025-02-11 NOTE — PROGRESS NOTES
Female Physical Note      Date: 2025   Patient Name: Porsha Bolden  : 1970   MRN: [id number]     Chief Complaint:    Chief Complaint   Patient presents with    Annual Exam       History of Present Illness: Porsha Bolden is a 54 y.o. female who is here today for their annual health maintenance and physical.   History of Present Illness  The patient is a 54-year-old female here for a complete physical.    She has been experiencing severe leg cramps, particularly in the shins, which have been so intense that she is unable to point her toes. These cramps have been causing her pain at night and during the day, and she describes her legs as being extremely active, similar to restless leg syndrome. She has been using a cane for support due to the unsteadiness caused by these cramps. She has been taking over-the-counter calcium, magnesium, and potassium supplements, and has resorted to drinking pickle juice for relief. Despite these measures, she continues to experience frequent urination and sleep disturbances due to the pain. She has been carrying a drink with her at all times to stay hydrated.    She has been using a brace intermittently for the past year to manage wrist and hand issues. She recently received a steroid injection, which provided significant relief. She also underwent radiofrequency ablation (RFA) on one side, which initially caused numbness in her ear and other areas, but this resolved quickly. However, after the second RFA on the other side, she experienced nerve pain similar to that felt during chemotherapy, with hair loss, heat sensation, and sensitivity to touch. This pain has persisted for over a week, causing discomfort even when brushing her hair.    She has been experiencing speech difficulties, which she believes started with her cancer diagnosis. She has been having trouble concentrating and finding words or people's names. She is concerned about developing  dementia, as she has been on multiple medications for cancer treatment.    She has been experiencing acid reflux for the past 2 to 3 weeks, with bile in her mouth and a burning sensation in her mouth and throat. She has been taking Pepcid and chewable tablets for relief. She has been prescribed famotidine, which has been effective in stopping her symptoms.    She has been using a CPAP machine for sleep apnea, but it has not been effective. She has seen several ENTs and was diagnosed with a deviated septum. She has been advised to undergo surgery to correct this, as her oxygen levels can drop below 70 during sleep. She has been told that her jaw is misaligned and may need to be broken and reset to improve her breathing. She has been informed that the Inspire device will not work for her due to her jaw alignment.    She has been experiencing back problems, particularly when bending forward. She has been advised to monitor her condition closely due to a slipped disc in her lumbar area, as she may eventually need surgery. She has been trying to avoid small surgeries due to her previous experience with knee surgery.    She has been experiencing elevated blood sugar levels, with an A1c of 9.3. She has been on metformin, Ozempic, and Jardiance for diabetes management. She has been trying to follow a ketogenic diet and has been working with a nutritionist. She has been considering starting insulin therapy, but has been hesitant due to concerns about heart disease and stroke, as she has a family history of these conditions. She has been planning to start walking and boxing for exercise.    She has been up to date on her influenza and COVID-19 vaccines. She has been due for a tetanus shot and has been unsure if she has had chickenpox. She has been due for a colonoscopy, as her last one was 5 years ago. She has been getting annual eye exams and has been due for one in December. She has been interested in seeing a gynecologist,  as she has been having back problems and has been unable to bend forward. She has been having trouble finding a new doctor since her previous one retired.    FAMILY HISTORY  The patient has cousins who have had strokes and heart attacks in their 40s.    MEDICATIONS  Current: metformin, Ozempic, Jardiance, pantoprazole, famotidine    IMMUNIZATIONS  The patient is up to date on influenza and COVID-19 vaccines.    Subjective      Review of Systems:   Review of Systems   Constitutional:  Positive for fatigue.   Musculoskeletal:  Positive for arthralgias and myalgias.   Neurological:  Positive for speech difficulty.       Past Medical History:   Past Medical History:   Diagnosis Date    Allergic     Arthritis     Asthma     Breast cancer 2018    Diabetes mellitus     Disease of thyroid gland     Fibromyalgia, primary     GERD (gastroesophageal reflux disease)     Headache     HL (hearing loss)     Hypothyroidism     Kidney stone     Low back pain     Scoliosis     Tremor     Visual impairment        Past Surgical History:   Past Surgical History:   Procedure Laterality Date    BREAST LUMPECTOMY Left 02/2018    COLONOSCOPY      EYE SURGERY      Lasix    FRACTURE SURGERY      HYSTERECTOMY      fibroid    KNEE ARTHROSCOPY  2004    KNEE SURGERY      x4    LYMPH NODE BIOPSY      REFRACTIVE SURGERY      Lasik    SUBTOTAL HYSTERECTOMY         Family History:   Family History   Problem Relation Age of Onset    Uterine cancer Mother     Arthritis Mother     Cancer Mother     Hearing loss Mother     Heart disease Mother     Hyperlipidemia Mother     Stroke Mother     Vision loss Mother     Asthma Mother     Lung cancer Father     Brain cancer Father 69    Cancer Father     Hearing loss Father     Breast cancer Sister     Arthritis Sister     Cancer Sister     Thyroid disease Sister     Brain cancer Paternal Grandmother     Heart disease Sister     Kidney disease Maternal Grandfather     Kidney disease Maternal Uncle        Social  History:   Social History     Socioeconomic History    Marital status:    Tobacco Use    Smoking status: Never    Smokeless tobacco: Never   Vaping Use    Vaping status: Never Used   Substance and Sexual Activity    Alcohol use: Not Currently     Comment: twice per year    Drug use: Never    Sexual activity: Yes     Partners: Male     Birth control/protection: Vasectomy, Hysterectomy     Comment: Only with  of 27 years       Medications:     Current Outpatient Medications:     famotidine (PEPCID) 40 MG tablet, Take 1 tablet by mouth Daily., Disp: 90 tablet, Rfl: 1    Insulin Glargine (LANTUS SOLOSTAR) 100 UNIT/ML injection pen, Inject 30 Units under the skin into the appropriate area as directed Every Night for 180 days., Disp: 9 mL, Rfl: 5    Insulin Pen Needle (Pen Needles) 31G X 8 MM misc, Use 1 each Daily., Disp: 90 each, Rfl: 1    cyclobenzaprine (FLEXERIL) 10 MG tablet, Take 1 tablet by mouth 2 (Two) Times a Day As Needed for Muscle Spasms., Disp: 180 tablet, Rfl: 1    dronabinol (MARINOL) 5 MG capsule, Take 1 capsule by mouth Daily., Disp: , Rfl:     empagliflozin (Jardiance) 25 MG tablet tablet, Take 1 tablet by mouth Every Morning., Disp: 90 tablet, Rfl: 1    escitalopram (LEXAPRO) 10 MG tablet, Take 1 tablet by mouth Daily., Disp: 90 tablet, Rfl: 3    gabapentin (NEURONTIN) 600 MG tablet, Take 1 tablet by mouth 3 (Three) Times a Day., Disp: 270 tablet, Rfl: 1    levocetirizine (XYZAL) 5 MG tablet, Take 1 tablet by mouth Every Evening., Disp: , Rfl:     metFORMIN (GLUCOPHAGE) 1000 MG tablet, Take 1 tablet by mouth Daily With Breakfast & Dinner., Disp: 180 tablet, Rfl: 1    mometasone (ELOCON) 0.1 % cream, Apply 1 Application topically to the appropriate area as directed., Disp: , Rfl:     Ozempic, 2 MG/DOSE, 8 MG/3ML solution pen-injector, INJECT 2 MG UNDER THE SKIN ONCE WEEKLY, Disp: 9 mL, Rfl: 1    pantoprazole (PROTONIX) 40 MG EC tablet, Take 1 tablet by mouth Daily., Disp: 90 tablet, Rfl:  1    SUMAtriptan (Imitrex) 100 MG tablet, Take one tablet at onset of headache. May repeat dose one time in 2 hours if headache not relieved., Disp: 9 tablet, Rfl: 3    traMADol (ULTRAM) 50 MG tablet, Take 1 tablet by mouth 3 (Three) Times a Day., Disp: 270 tablet, Rfl: 1    triamcinolone (KENALOG) 0.1 % ointment, Apply To Affected Area As Needed, Disp: , Rfl:     Allergies:   Allergies   Allergen Reactions    Codeine GI Intolerance    Morphine GI Intolerance    Hydrocodone-Acetaminophen Itching    Cytoxan [Cyclophosphamide] Hives    Fluticasone Other (See Comments)    Ginger Other (See Comments)    Taxotere [Docetaxel] Hives    Vilanterol Other (See Comments)    Zyrtec [Cetirizine] Rash       Immunization History   Administered Date(s) Administered    COVID-19 (MODERNA) 1st,2nd,3rd Dose Monovalent 03/09/2021, 04/06/2021, 10/08/2021    Fluzone  >6mos 12/03/2024    Fluzone (or Fluarix & Flulaval for VFC) >6mos 10/05/2022, 11/27/2023    Pneumococcal Conjugate 20-Valent (PCV20) 10/05/2022    Shingrix 02/11/2025      Colorectal Screening:     Last Completed Colonoscopy       This patient has no relevant Health Maintenance data.          Pap:    Last Completed Pap Smear       This patient has no relevant Health Maintenance data.           Mammogram:    Last Completed Mammogram            Ordered - MAMMOGRAM (Every 2 Years) Ordered on 7/31/2024 01/23/2024  Mammo Screening Digital Tomosynthesis Bilateral With CAD    01/25/2023  Mammo Diagnostic Digital Tomosynthesis Bilateral With CAD    07/28/2021  Mammo Diagnostic Digital Tomosynthesis Bilateral With CAD    07/22/2020  Mammo Diagnostic Digital Tomosynthesis Bilateral With CAD    01/22/2020  Mammo Diagnostic Digital Tomosynthesis Left With CAD    Only the first 5 history entries have been loaded, but more history exists.                         Diet/Physical activity: Appropriate diet and exercise discussed.    PHQ-2 Depression Screening  Little interest or pleasure  "in doing things?     Feeling down, depressed, or hopeless?     PHQ-2 Total Score             Objective     Physical Exam:  Vital Signs:   Vitals:    02/11/25 0925   BP: 118/58   Pulse: 101   SpO2: 98%   Weight: 76.7 kg (169 lb)   Height: 170.2 cm (67\")     Body mass index is 26.47 kg/m².     Physical Exam  Vitals and nursing note reviewed.   Constitutional:       General: She is not in acute distress.     Appearance: Normal appearance. She is not ill-appearing.   HENT:      Head: Normocephalic and atraumatic.      Right Ear: Tympanic membrane and ear canal normal.      Left Ear: Tympanic membrane and ear canal normal.      Nose: Nose normal.   Cardiovascular:      Rate and Rhythm: Normal rate and regular rhythm.      Heart sounds: Normal heart sounds.   Pulmonary:      Effort: Pulmonary effort is normal.      Breath sounds: Normal breath sounds.   Neurological:      Mental Status: She is alert and oriented to person, place, and time. Mental status is at baseline.      Gait: Gait abnormal.   Psychiatric:         Mood and Affect: Mood normal.       Physical Exam      Procedures  Results  Laboratory Studies  A1c was 9.3. Sodium, potassium, and chloride levels were normal. Kidney function was fine. Heart failure test was low.    Imaging  CT scan showed no evidence of aortic aneurysm.    Assessment / Plan      Assessment/Plan:   Diagnoses and all orders for this visit:    1. Routine general medical examination at a health care facility (Primary)  -     Ambulatory Referral to Gynecology  -     CBC & Differential  -     Comprehensive Metabolic Panel  -     Lipid Panel  -     TSH    2. Type 2 diabetes mellitus without complication, without long-term current use of insulin  -     POC Glycosylated Hemoglobin (Hb A1C)  -     CBC & Differential  -     Comprehensive Metabolic Panel  -     Lipid Panel  -     TSH  -     Cancel: Microalbumin / Creatinine Urine Ratio - Urine, Clean Catch  -     Microalbumin / Creatinine Urine Ratio " - Urine, Clean Catch    3. Gastroesophageal reflux disease without esophagitis  -     famotidine (PEPCID) 40 MG tablet; Take 1 tablet by mouth Daily.  Dispense: 90 tablet; Refill: 1    4. Acquired hypothyroidism  -     CBC & Differential  -     Comprehensive Metabolic Panel  -     Lipid Panel  -     TSH    5. Mixed hyperlipidemia  -     CBC & Differential  -     Comprehensive Metabolic Panel  -     Lipid Panel  -     TSH    6. TALHA (obstructive sleep apnea)    7. Bicuspid aortic valve determined by imaging    8. Chronic low back pain without sciatica, unspecified back pain laterality    9. Arthritis    Other orders  -     Discontinue: Insulin Glargine (LANTUS SOLOSTAR) 100 UNIT/ML injection pen; Inject 30 Units under the skin into the appropriate area as directed Every Night for 180 days.  Dispense: 9 mL; Refill: 5  -     Discontinue: Insulin Pen Needle (Pen Needles) 31G X 8 MM misc; Use 1 each Daily.  Dispense: 90 each; Refill: 1  -     Shingrix Vaccine  -     Insulin Glargine (LANTUS SOLOSTAR) 100 UNIT/ML injection pen; Inject 30 Units under the skin into the appropriate area as directed Every Night for 180 days.  Dispense: 9 mL; Refill: 5  -     Insulin Pen Needle (Pen Needles) 31G X 8 MM misc; Use 1 each Daily.  Dispense: 90 each; Refill: 1       Assessment & Plan  1. Bicuspid aortic valve.  The patient has a bicuspid aortic valve with a minor leak. The condition is hereditary but not present in her family history. A CT scan was performed to rule out an aortic aneurysm, which showed no aneurysmal or acute findings. The cardiologist plans to monitor the condition with a follow-up in a year.    2. Hyperglycemia/ DM2.  Her A1c is elevated at 9.3, likely exacerbated by recent steroid injections. She is currently on maximum doses of metformin, Ozempic, and Jardiance, but her blood glucose levels remain high. She will start long-acting insulin, beginning with 10 units daily in the evening. If her average blood glucose  levels do not decrease to below 150 or 160 after 5 to 6 days, the dosage will be increased by 2 to 3 units. She will continue her current medications. A urine test will be conducted to check for proteinuria, and additional blood work will be ordered to assess her A1c levels.    3. Leg cramps.  Her leg cramps are likely due to dehydration from increased urination caused by hyperglycemia. Electrolyte levels, including sodium, potassium, and chloride, were normal at the end of January 2024. Kidney function was also normal. She is advised to increase fluid intake and consider magnesium supplementation.    4. Gastroesophageal reflux disease (GERD).  Her GERD symptoms have worsened over the past 2 to 3 weeks, possibly due to dietary changes and the use of Ozempic. She is advised to take famotidine 20 mg as needed and avoid lying down immediately after eating. If symptoms persist, she may need to consult a gastroenterologist. A prescription for famotidine 40 mg will be provided once her current supply is depleted.    5. Deviated septum.  She has a severe deviated septum contributing to low oxygen levels during sleep. CPAP therapy has been ineffective. She is advised to consult an ENT specialist for potential surgical correction.    6. Health maintenance.  She is due for a tetanus shot and shingles vaccine. The shingles vaccine will be administered today, and the tetanus shot can be received at any pharmacy. A gynecology appointment will be scheduled for her. She is advised to continue annual eye exams, with the next one due in December 2024. A colonoscopy report will be obtained to determine the next screening date.    PROCEDURE  The patient received a steroid injection recently, which provided significant relief. She also underwent radiofrequency ablation (RFA) on one side, which initially caused numbness in her ear and other areas, but this resolved quickly. After the second RFA on the other side, she experienced nerve  pain similar to that felt during chemotherapy, with hair loss, heat sensation, and sensitivity to touch.        Follow Up:   No follow-ups on file.    Healthcare Maintenance:   Counseling provided on appropriate health maintenance.   Porsha Bolden voices understanding and acceptance of this advice and will call back with any further questions or concerns. AVS with preventive healthcare tips printed for patient.     Jason Benson MD  [facility] [place] [facility]    Patient or patient representative verbalized consent for the use of Ambient Listening during the visit with  Jason Benson MD for chart documentation. 2/11/2025  12:52 EST

## 2025-02-12 LAB
ALBUMIN SERPL-MCNC: 4.4 G/DL (ref 3.8–4.9)
ALBUMIN/CREAT UR: 15 MG/G CREAT (ref 0–29)
ALP SERPL-CCNC: 143 IU/L (ref 44–121)
ALT SERPL-CCNC: 21 IU/L (ref 0–32)
AST SERPL-CCNC: 14 IU/L (ref 0–40)
BASOPHILS # BLD AUTO: NORMAL 10*3/UL
BILIRUB SERPL-MCNC: 0.2 MG/DL (ref 0–1.2)
BUN SERPL-MCNC: 22 MG/DL (ref 6–24)
BUN/CREAT SERPL: 23 (ref 9–23)
CALCIUM SERPL-MCNC: 10.1 MG/DL (ref 8.7–10.2)
CHLORIDE SERPL-SCNC: 99 MMOL/L (ref 96–106)
CHOLEST SERPL-MCNC: 219 MG/DL (ref 100–199)
CO2 SERPL-SCNC: 25 MMOL/L (ref 20–29)
CREAT SERPL-MCNC: 0.94 MG/DL (ref 0.57–1)
CREAT UR-MCNC: 349.8 MG/DL
EGFRCR SERPLBLD CKD-EPI 2021: 72 ML/MIN/1.73
EOSINOPHIL # BLD AUTO: NORMAL 10*3/UL
EOSINOPHIL NFR BLD AUTO: NORMAL %
GLOBULIN SER CALC-MCNC: 2.2 G/DL (ref 1.5–4.5)
GLUCOSE SERPL-MCNC: 193 MG/DL (ref 70–99)
HCT VFR BLD AUTO: NORMAL %
HDLC SERPL-MCNC: 51 MG/DL
HGB BLD-MCNC: NORMAL G/DL
LDLC SERPL CALC-MCNC: 135 MG/DL (ref 0–99)
LYMPHOCYTES # BLD AUTO: NORMAL 10*3/UL
LYMPHOCYTES NFR BLD AUTO: NORMAL %
MICROALBUMIN UR-MCNC: 53.6 UG/ML
MONOCYTES NFR BLD AUTO: NORMAL %
NEUTROPHILS NFR BLD AUTO: NORMAL %
PLATELET # BLD AUTO: NORMAL 10*3/UL
POTASSIUM SERPL-SCNC: 4.5 MMOL/L (ref 3.5–5.2)
PROT SERPL-MCNC: 6.6 G/DL (ref 6–8.5)
RBC # BLD AUTO: NORMAL 10*6/UL
SODIUM SERPL-SCNC: 140 MMOL/L (ref 134–144)
SPECIMEN STATUS: NORMAL
TRIGL SERPL-MCNC: 184 MG/DL (ref 0–149)
TSH SERPL DL<=0.005 MIU/L-ACNC: 7.13 UIU/ML (ref 0.45–4.5)
VLDLC SERPL CALC-MCNC: 33 MG/DL (ref 5–40)
WBC # BLD AUTO: NORMAL X10E3/UL

## 2025-02-19 ENCOUNTER — PATIENT MESSAGE (OUTPATIENT)
Dept: FAMILY MEDICINE CLINIC | Facility: CLINIC | Age: 55
End: 2025-02-19
Payer: COMMERCIAL

## 2025-03-24 RX ORDER — SEMAGLUTIDE 2.68 MG/ML
2 INJECTION, SOLUTION SUBCUTANEOUS WEEKLY
Qty: 9 ML | Refills: 1 | Status: SHIPPED | OUTPATIENT
Start: 2025-03-24

## 2025-04-10 ENCOUNTER — OFFICE VISIT (OUTPATIENT)
Dept: FAMILY MEDICINE CLINIC | Facility: CLINIC | Age: 55
End: 2025-04-10
Payer: COMMERCIAL

## 2025-04-10 VITALS
OXYGEN SATURATION: 95 % | HEART RATE: 100 BPM | BODY MASS INDEX: 27.15 KG/M2 | SYSTOLIC BLOOD PRESSURE: 120 MMHG | HEIGHT: 67 IN | WEIGHT: 173 LBS | DIASTOLIC BLOOD PRESSURE: 84 MMHG

## 2025-04-10 DIAGNOSIS — S46.212A STRAIN OF LEFT BICEPS MUSCLE, INITIAL ENCOUNTER: Primary | ICD-10-CM

## 2025-04-10 DIAGNOSIS — M79.622 PAIN OF LEFT UPPER ARM: ICD-10-CM

## 2025-04-10 NOTE — PROGRESS NOTES
".Chief Complaint  Shoulder Pain (Pt has been having pain in her left shoulder and bicep for a week now. Pt states it may be a possible strain )    Subjective          History of Present Illness  Porsha Bolden is here today with her  History of Present Illness  The patient presents for evaluation of shoulder pain.    She began experiencing shoulder discomfort approximately 1.5 weeks ago, which has progressively worsened. The pain was initially localized to the bicep and tricep muscles but has since radiated to the shoulder. The pain management specialist suggested a potential strain in these muscles, although she can not recall any specific incident that may have caused this. The onset of pain was sudden, occurring upon awakening one morning. She reports difficulty sleeping on the affected side and limited arm mobility due to pain. The pain intensifies with elevation of the arm and extends into the shoulder. She also experiences pain when carrying objects with the affected hand and reports thumb pain during the visit. She does not remember any falls or new exercise routines but speculates that playing with her 100-pound dog may have contributed to the injury. She also reports a clicking sound and a sensation of catching in the shoulder during movement. Despite attempts at self-management with hot and cold therapy, Salonpas, and Voltaren, there has been no relief. Her current medication regimen includes daily Flexeril (cyclobenzaprine) and Neurontin (gabapentin), tramadol and medical marijuana but she does not take ibuprofen.    MEDICATIONS  Current: Flexeril, Neurontin, tramadol, dronabinol    Objective   Vital Signs:   /84 (BP Location: Left arm, Patient Position: Sitting)   Pulse 100   Ht 170.2 cm (67\")   Wt 78.5 kg (173 lb)   SpO2 95%   BMI 27.10 kg/m²     Body mass index is 27.1 kg/m².         Review of Systems      Current Outpatient Medications:   •  cyclobenzaprine (FLEXERIL) 10 MG tablet, " Take 1 tablet by mouth 2 (Two) Times a Day As Needed for Muscle Spasms., Disp: 180 tablet, Rfl: 1  •  empagliflozin (Jardiance) 25 MG tablet tablet, Take 1 tablet by mouth Every Morning., Disp: 90 tablet, Rfl: 1  •  escitalopram (LEXAPRO) 10 MG tablet, Take 1 tablet by mouth Daily., Disp: 90 tablet, Rfl: 3  •  famotidine (PEPCID) 40 MG tablet, Take 1 tablet by mouth Daily., Disp: 90 tablet, Rfl: 1  •  gabapentin (NEURONTIN) 600 MG tablet, Take 1 tablet by mouth 3 (Three) Times a Day., Disp: 270 tablet, Rfl: 1  •  Insulin Glargine (LANTUS SOLOSTAR) 100 UNIT/ML injection pen, Inject 30 Units under the skin into the appropriate area as directed Every Night for 180 days., Disp: 9 mL, Rfl: 5  •  Insulin Pen Needle (Pen Needles) 31G X 8 MM misc, Use 1 each Daily., Disp: 90 each, Rfl: 1  •  levocetirizine (XYZAL) 5 MG tablet, Take 1 tablet by mouth Every Evening., Disp: , Rfl:   •  metFORMIN (GLUCOPHAGE) 1000 MG tablet, Take 1 tablet by mouth Daily With Breakfast & Dinner., Disp: 180 tablet, Rfl: 1  •  mometasone (ELOCON) 0.1 % cream, Apply 1 Application topically to the appropriate area as directed., Disp: , Rfl:   •  Ozempic, 2 MG/DOSE, 8 MG/3ML solution pen-injector, INJECT 2 MG UNDER THE SKIN ONCE WEEKLY, Disp: 9 mL, Rfl: 1  •  pantoprazole (PROTONIX) 40 MG EC tablet, Take 1 tablet by mouth Daily., Disp: 90 tablet, Rfl: 1  •  SUMAtriptan (Imitrex) 100 MG tablet, Take one tablet at onset of headache. May repeat dose one time in 2 hours if headache not relieved., Disp: 9 tablet, Rfl: 3  •  traMADol (ULTRAM) 50 MG tablet, Take 1 tablet by mouth 3 (Three) Times a Day., Disp: 270 tablet, Rfl: 1  •  triamcinolone (KENALOG) 0.1 % ointment, Apply To Affected Area As Needed, Disp: , Rfl:     Allergies: Codeine, Morphine, Hydrocodone-acetaminophen, Cytoxan [cyclophosphamide], Fluticasone, Ginger, Taxotere [docetaxel], Vilanterol, and Zyrtec [cetirizine]    Physical Exam  Vitals and nursing note reviewed.   Constitutional:        General: She is not in acute distress.     Appearance: Normal appearance. She is not ill-appearing, toxic-appearing or diaphoretic.   HENT:      Head: Normocephalic and atraumatic.   Musculoskeletal:        Arms:    Neurological:      General: No focal deficit present.      Mental Status: She is alert.   Psychiatric:         Mood and Affect: Mood normal.         Behavior: Behavior normal.      Physical Exam      Result Review :          Results               Assessment and Plan    Diagnoses and all orders for this visit:    1. Strain of left biceps muscle, initial encounter (Primary)    2. Pain of left upper arm  -     XR Humerus Left (In Office)      Assessment & Plan    The patient's current medication regimen, which includes gabapentin, tramadol, cyclobenzaprine, and dronabinol, does not address the underlying muscle strain or inflammation. The application of heat provides some relief, but it is not sufficient to alleviate the pain completely. She is advised to take Aleve once daily for a week. An x-ray of the shoulder will be ordered today. If there is no improvement with Aleve, she should contact us, and we will consider initiating physical therapy.      Follow Up   No follow-ups on file.  Patient was given instructions and counseling regarding her condition or for health maintenance advice. Please see specific information pulled into the AVS if appropriate.     Patient or patient representative verbalized consent for the use of Ambient Listening during the visit with  LAKISHA Shaffer for chart documentation. 4/10/2025  16:05 EDT    LAKISHA Shaffer  04/10/2025

## 2025-04-15 DIAGNOSIS — S46.212A STRAIN OF LEFT BICEPS MUSCLE, INITIAL ENCOUNTER: ICD-10-CM

## 2025-04-15 DIAGNOSIS — M79.622 PAIN OF LEFT UPPER ARM: Primary | ICD-10-CM

## 2025-04-21 ENCOUNTER — TELEPHONE (OUTPATIENT)
Dept: FAMILY MEDICINE CLINIC | Facility: CLINIC | Age: 55
End: 2025-04-21
Payer: COMMERCIAL

## 2025-04-21 NOTE — TELEPHONE ENCOUNTER
Patient and her  are requesting a call back from Dr. Benson. They are upset about waiting 2 weeks to see an orthopedics specialist. She saw Orly on 04/10/25 and she referred her.     [phone]

## 2025-04-22 NOTE — TELEPHONE ENCOUNTER
I talked w/ Ortho manager and got her an appt today. I have left a message for pt to call to advise.

## 2025-04-24 NOTE — TELEPHONE ENCOUNTER
Patient called and advised she was unable to make it to appt. On 4/22 as she was in other appts. I talked with  at ortho. And they have her scheduled for 4/28 at 2:40pm. Pt is aware.

## 2025-04-24 NOTE — TELEPHONE ENCOUNTER
Spoke to patient. She states that she was not able to go to the appt and no showed appt on the 04/22 because she was not getting messages. Patient would like to get in with Orthopedics again. Please advise to see if we can get her scheduled again. Her original appt was 05/06/25 and Brionna called the manager to move pt up because she was upset that she has to wait 2 weeks to get in. That's why appt was moved.

## 2025-04-28 ENCOUNTER — OFFICE VISIT (OUTPATIENT)
Age: 55
End: 2025-04-28
Payer: COMMERCIAL

## 2025-04-28 VITALS
DIASTOLIC BLOOD PRESSURE: 70 MMHG | BODY MASS INDEX: 27.82 KG/M2 | SYSTOLIC BLOOD PRESSURE: 112 MMHG | HEIGHT: 66 IN | WEIGHT: 173.1 LBS

## 2025-04-28 DIAGNOSIS — M25.512 LEFT SHOULDER PAIN, UNSPECIFIED CHRONICITY: Primary | ICD-10-CM

## 2025-04-28 DIAGNOSIS — M19.012 GLENOHUMERAL ARTHRITIS, LEFT: ICD-10-CM

## 2025-04-28 DIAGNOSIS — M75.82 ROTATOR CUFF TENDONITIS, LEFT: ICD-10-CM

## 2025-04-28 DIAGNOSIS — M75.82 ROTATOR CUFF TENDONITIS, LEFT: Primary | ICD-10-CM

## 2025-04-28 PROCEDURE — 99213 OFFICE O/P EST LOW 20 MIN: CPT | Performed by: PHYSICIAN ASSISTANT

## 2025-04-28 RX ORDER — LEVOTHYROXINE SODIUM 25 UG/1
TABLET ORAL
COMMUNITY

## 2025-04-28 RX ORDER — DRONABINOL 5 MG/1
1 CAPSULE ORAL EVERY 24 HOURS
COMMUNITY
Start: 2025-04-26 | End: 2025-05-22

## 2025-04-28 NOTE — PROGRESS NOTES
[facility] [place] [facility] Office Visit - Anabela Jones PA-C    Office Visit       Patient Name: Porsha Bolden    Chief Complaint:   Chief Complaint   Patient presents with    Left Upper Arm - Pain       Referring Physician: No ref. provider found    History of Present Illness:   Porsha Bolden is a very pleasant 55 y.o. female who presents to discuss her left shoulder and upper arm pain.  She is right-hand dominant.  She denies any trauma or injury.  Pain for 3 weeks.  She reports pain when lying on her left side reaching overhead and reaching behind.  She does have some radiating pain down the arm with nothing past the elbow.  Patient does have a history of cervical problems with RFA by interventional pain.  No prior treatment.  Here for further evaluation and treatment recommendations.      Subjective     Review of Systems   All other systems reviewed and are negative.       Past Medical History:   Past Medical History:   Diagnosis Date    Abnormal ECG     Tricuspid heart valve    Allergic     Arthritis     Arthritis of back     Lumbar    Asthma     Breast cancer 2018    Bursitis of hip     Cervical disc disorder     Diabetes mellitus     Disease of thyroid gland     Fibroid     Removed uterus 2012    Fibromyalgia, primary     GERD (gastroesophageal reflux disease)     Gout     Headache     HL (hearing loss)     Hypothyroidism     Kidney stone     Knee swelling     Low back pain     Lumbosacral disc disease     Migraine     PONV (postoperative nausea and vomiting)     Scoliosis     Sleep apnea     Subluxation of patella     Tear of meniscus of knee 1999    Left    Tremor     Varicella     Childhood    Visual impairment        Past Surgical History:   Past Surgical History:   Procedure Laterality Date    ABLATION OF DYSRHYTHMIC FOCUS      BREAST BIOPSY      And left laparoscopy    BREAST LUMPECTOMY Left 02/2018    COLONOSCOPY      EYE SURGERY       Lasix    FRACTURE SURGERY      HYSTERECTOMY      fibroid    KNEE ARTHROSCOPY  2004    KNEE SURGERY      x4    LYMPH NODE BIOPSY      REFRACTIVE SURGERY      Lasik    SUBTOTAL HYSTERECTOMY      TOTAL ABDOMINAL HYSTERECTOMY      TRIGGER POINT INJECTION      Occipital    WISDOM TOOTH EXTRACTION         Family History:   Family History   Problem Relation Age of Onset    Uterine cancer Mother     Arthritis Mother     Cancer Mother         Uteran    Hearing loss Mother     Heart disease Mother     Hyperlipidemia Mother     Stroke Mother     Vision loss Mother     Asthma Mother     Lung cancer Father     Brain cancer Father 69    Cancer Father         Brain and lung    Hearing loss Father     Breast cancer Sister     Arthritis Sister     Cancer Sister         Breast    Thyroid disease Sister     Brain cancer Paternal Grandmother     Heart disease Sister     Kidney disease Maternal Grandfather     Kidney disease Maternal Uncle     Heart disease Sister        Social History:   Social History     Socioeconomic History    Marital status:    Tobacco Use    Smoking status: Never    Smokeless tobacco: Never   Vaping Use    Vaping status: Never Used   Substance and Sexual Activity    Alcohol use: Not Currently     Comment: twice per year    Drug use: Never    Sexual activity: Yes     Partners: Male     Birth control/protection: Vasectomy, Hysterectomy     Comment: Only with  of 29 years       Medications:   Current Outpatient Medications:     cyclobenzaprine (FLEXERIL) 10 MG tablet, Take 1 tablet by mouth 2 (Two) Times a Day As Needed for Muscle Spasms., Disp: 180 tablet, Rfl: 1    dronabinol (MARINOL) 5 MG capsule, 1 capsule Daily., Disp: , Rfl:     empagliflozin (Jardiance) 25 MG tablet tablet, Take 1 tablet by mouth Every Morning., Disp: 90 tablet, Rfl: 1    escitalopram (LEXAPRO) 10 MG tablet, Take 1 tablet by mouth Daily., Disp: 90 tablet, Rfl: 3    famotidine (PEPCID) 40 MG tablet, Take 1 tablet by mouth  Daily., Disp: 90 tablet, Rfl: 1    gabapentin (NEURONTIN) 600 MG tablet, Take 1 tablet by mouth 3 (Three) Times a Day., Disp: 270 tablet, Rfl: 1    Insulin Glargine (LANTUS SOLOSTAR) 100 UNIT/ML injection pen, Inject 30 Units under the skin into the appropriate area as directed Every Night for 180 days., Disp: 9 mL, Rfl: 5    Insulin Pen Needle (Pen Needles) 31G X 8 MM misc, Use 1 each Daily., Disp: 90 each, Rfl: 1    levocetirizine (XYZAL) 5 MG tablet, Take 1 tablet by mouth Every Evening., Disp: , Rfl:     levothyroxine (SYNTHROID, LEVOTHROID) 25 MCG tablet, (Prior Auth: Rx Ref#:[id number]) Oral for 90, Disp: , Rfl:     metFORMIN (GLUCOPHAGE) 1000 MG tablet, Take 1 tablet by mouth Daily With Breakfast & Dinner., Disp: 180 tablet, Rfl: 1    mometasone (ELOCON) 0.1 % cream, Apply 1 Application topically to the appropriate area as directed., Disp: , Rfl:     Ozempic, 2 MG/DOSE, 8 MG/3ML solution pen-injector, INJECT 2 MG UNDER THE SKIN ONCE WEEKLY, Disp: 9 mL, Rfl: 1    pantoprazole (PROTONIX) 40 MG EC tablet, Take 1 tablet by mouth Daily., Disp: 90 tablet, Rfl: 1    SUMAtriptan (Imitrex) 100 MG tablet, Take one tablet at onset of headache. May repeat dose one time in 2 hours if headache not relieved., Disp: 9 tablet, Rfl: 3    traMADol (ULTRAM) 50 MG tablet, Take 1 tablet by mouth 3 (Three) Times a Day., Disp: 270 tablet, Rfl: 1    triamcinolone (KENALOG) 0.1 % ointment, Apply To Affected Area As Needed, Disp: , Rfl:     Allergies:   Allergies   Allergen Reactions    Codeine GI Intolerance    Morphine GI Intolerance, Other (See Comments) and Unknown - High Severity    Hydrocodone-Acetaminophen Itching    Cyclophosphamide Hives and Unknown - High Severity    Fluticasone Other (See Comments)    Ginger Other (See Comments)    Taxotere [Docetaxel] Hives    Vilanterol Other (See Comments)    Zyrtec [Cetirizine] Rash       I have reviewed and updated the following portions of the patient's history and review of systems:  "allergies, current medications, past family history, past medical history, past social history, past surgical history and problem list.    Objective      Vital Signs:   Vitals:    04/28/25 1454   BP: 112/70   Weight: 78.5 kg (173 lb 1.6 oz)   Height: 167.6 cm (66\")       Ortho Exam:  Left shoulder exam: Forward flexion 180 abduction 180 external rotation 80 with pain with forward flexion.  Tender over the anterior shoulder and mild biceps tenderness.  No scapular or cervical tenderness.  5/5 rotator cuff strength.  Positive Ace, positive empty can, neurovascular intact distally.    Results Review:   XR Shoulder 2+ View Left  Indication: left shoulder pain.      Views:AP lateral and scapular Y views of the shoulder are submitted.    Impression: There is no fracture subluxation or dislocation. The   glenohumeral joint space is reduced with inferior humeral head   osteophytes. Mild degenerative changes of the AC joint. There are no acute   findings.    Comparison: none.          Assessment / Plan      Assessment:  Diagnoses and all orders for this visit:    1. Left shoulder pain, unspecified chronicity (Primary)  -     XR Shoulder 2+ View Left    2. Rotator cuff tendonitis, left    3. Glenohumeral arthritis, left        Quality Metrics:   BMI:   BMI is >= 25 and <30. (Overweight) The following options were offered after discussion;: Information on healthy weight added to patient's after visit summary.       Tobacco:   Porsha Bolden  reports that she has never smoked. She has never used smokeless tobacco.      Plan:  Left rotator cuff tendinitis with glenohumeral arthritis.  I reviewed x-rays of the humerus from 4/10/2025, today's shoulder x-rays, clinical findings past and current treatment patient.  X-rays do show mild arthritis in the glenohumeral joint but I do not think this is the source of her pain.  I think her pain is secondary to rotator cuff tendinitis.  We discussed further treatment including " round of physical therapy, subacromial injection.  Patient is diabetic with an A1c last of 9.3.  She is scheduled for occipital nerve injections tomorrow.  Given her elevated A1c and history of increased sugars with cortisone, we will avoid injection at this time.  Have given her prescription for physical therapy.  I will see her back in 6 weeks, sooner if needed.          Anabela Jones PA-C  [facility] [place] [facility]       Dictated using Dragon Speech Recognition.

## 2025-05-09 DIAGNOSIS — F39 MOOD DISORDER: ICD-10-CM

## 2025-05-09 RX ORDER — ESCITALOPRAM OXALATE 10 MG/1
10 TABLET ORAL DAILY
Qty: 90 TABLET | Refills: 3 | OUTPATIENT
Start: 2025-05-09

## 2025-05-11 DIAGNOSIS — M54.50 ACUTE LOW BACK PAIN WITHOUT SCIATICA, UNSPECIFIED BACK PAIN LATERALITY: ICD-10-CM

## 2025-05-13 ENCOUNTER — OFFICE VISIT (OUTPATIENT)
Dept: FAMILY MEDICINE CLINIC | Facility: CLINIC | Age: 55
End: 2025-05-13
Payer: COMMERCIAL

## 2025-05-13 VITALS
HEART RATE: 98 BPM | SYSTOLIC BLOOD PRESSURE: 120 MMHG | WEIGHT: 170 LBS | BODY MASS INDEX: 27.44 KG/M2 | DIASTOLIC BLOOD PRESSURE: 84 MMHG | OXYGEN SATURATION: 94 %

## 2025-05-13 DIAGNOSIS — M54.2 NECK PAIN: Primary | ICD-10-CM

## 2025-05-13 DIAGNOSIS — F43.20 GRIEF REACTION: ICD-10-CM

## 2025-05-13 DIAGNOSIS — G47.33 OSA (OBSTRUCTIVE SLEEP APNEA): ICD-10-CM

## 2025-05-13 DIAGNOSIS — Q23.81 BICUSPID AORTIC VALVE DETERMINED BY IMAGING: ICD-10-CM

## 2025-05-13 DIAGNOSIS — R25.2 LEG CRAMPS: ICD-10-CM

## 2025-05-13 DIAGNOSIS — M79.622 PAIN OF LEFT UPPER ARM: ICD-10-CM

## 2025-05-13 DIAGNOSIS — E03.9 ACQUIRED HYPOTHYROIDISM: ICD-10-CM

## 2025-05-13 DIAGNOSIS — E11.9 TYPE 2 DIABETES MELLITUS WITHOUT COMPLICATION, WITHOUT LONG-TERM CURRENT USE OF INSULIN: ICD-10-CM

## 2025-05-13 DIAGNOSIS — E55.9 VITAMIN D DEFICIENCY: ICD-10-CM

## 2025-05-13 RX ORDER — EMPAGLIFLOZIN 25 MG/1
25 TABLET, FILM COATED ORAL EVERY MORNING
Qty: 90 TABLET | Refills: 1 | Status: SHIPPED | OUTPATIENT
Start: 2025-05-13

## 2025-05-13 RX ORDER — PANTOPRAZOLE SODIUM 40 MG/1
40 TABLET, DELAYED RELEASE ORAL DAILY
Qty: 90 TABLET | Refills: 1 | Status: SHIPPED | OUTPATIENT
Start: 2025-05-13

## 2025-05-13 RX ORDER — GABAPENTIN 600 MG/1
600 TABLET ORAL 3 TIMES DAILY
Qty: 270 TABLET | Refills: 1 | Status: SHIPPED | OUTPATIENT
Start: 2025-05-13

## 2025-05-13 NOTE — PROGRESS NOTES
Follow Up Office Visit      Date of Visit:  2025   Patient Name: Porsha Bolden  : 1970   MRN: [id number]     Chief Complaint:    Chief Complaint   Patient presents with    Diabetes       History of Present Illness: Porsha Bolden is a 55 y.o. female who is here today for follow up.    History of Present Illness  The patient presents for evaluation of left shoulder pain, neck pain, diabetes mellitus, sleep apnea, leg cramps, hot flashes, and bicuspid aortic valve.    She has been experiencing left shoulder discomfort, which was initially evaluated with x-rays by an orthopedic specialist. The findings revealed some arthritic changes, but these were not deemed to be the cause of her symptoms. She began physical therapy last week, but after the second session, she reported increased pain. She is scheduled for an MRI next month if there is no improvement with physical therapy. She reports no specific injury or trauma to the shoulder and notes that the pain is affecting her sleep and mobility.    She has undergone radiofrequency ablation (RFA) on both sides of her neck, which provided significant relief. However, she believes that the occipital nerve may have been inadvertently affected during the procedure. She describes a burning and tingling sensation in her scalp, similar to the sensation she experienced during chemotherapy. She has received trigger point injections and other treatments, but these have not provided significant relief.    She has been receiving steroid injections, which have caused fluctuations in her blood sugar levels. She has been managing this with Lantus insulin at a dose of 30 units. Her blood sugar levels have been inconsistent, with some readings within the normal range and others elevated. She reports a weight loss of 3 pounds since her last visit. She also reports persistent thirst and frequent urination, although less so than before. She has been using  Ozempic and has some remaining at home. She expresses a desire to reduce her medication burden.    She has been diagnosed with sleep apnea and has been using a CPAP machine. She has tried multiple masks and machines without success. She was referred to  where she saw 3 or 4 specialists. They discussed rhinoplasty because she has a severe deviated septum that needs correction. She has not signed up for it yet because she does not want to undergo surgery soon. She was told she is not a candidate for Inspire because one jaw is smaller than the other and sits in the wrong place. The only way to fix that is to separate the jaw, move it forward, and fix the teeth to have them be closed right. This requires multiple procedures, and the dental portion is not covered by insurance. She is considering rhinoplasty as a potential solution to her breathing difficulties.    She continues to experience leg cramps at night, particularly in her feet and calves. She has been managing this with pickle juice and over-the-counter vitamins and calcium supplements. She has also been taking electrolyte powder, which has provided some relief from daytime cramping. She reports that the severity of her cramps has increased recently, which she attributes to increased perspiration due to hot weather and hot flashes.    She has been experiencing hot flashes, which she attributes to menopause. She has an upcoming appointment with a gynecologist next month. She believes that her symptoms may be related to discontinuation of tamoxifen.    She has a known diagnosis of bicuspid aortic valve and is under regular surveillance for this condition. She has an appointment scheduled for 02/2026.      Subjective      Review of Systems:   Review of Systems    Past Medical History:   Past Medical History:   Diagnosis Date    Abnormal ECG     Tricuspid heart valve    Allergic     Arthritis     Arthritis of back     Lumbar    Asthma     Breast cancer 2018     Bursitis of hip     Cervical disc disorder     Diabetes mellitus     Disease of thyroid gland     Fibroid     Removed uterus 2012    Fibromyalgia, primary     GERD (gastroesophageal reflux disease)     Gout     Headache     HL (hearing loss)     Hypothyroidism     Kidney stone     Knee swelling     Low back pain     Lumbosacral disc disease     Migraine     PONV (postoperative nausea and vomiting)     Scoliosis     Sleep apnea     Subluxation of patella     Tear of meniscus of knee 1999    Left    Tremor     Varicella     Childhood    Visual impairment        Past Surgical History:   Past Surgical History:   Procedure Laterality Date    ABLATION OF DYSRHYTHMIC FOCUS      BREAST BIOPSY      And left laparoscopy    BREAST LUMPECTOMY Left 02/2018    COLONOSCOPY      EYE SURGERY      Lasix    FRACTURE SURGERY      HYSTERECTOMY      fibroid    KNEE ARTHROSCOPY  2004    KNEE SURGERY      x4    LYMPH NODE BIOPSY      REFRACTIVE SURGERY      Lasik    SUBTOTAL HYSTERECTOMY      TOTAL ABDOMINAL HYSTERECTOMY      TRIGGER POINT INJECTION      Occipital    WISDOM TOOTH EXTRACTION         Family History:   Family History   Problem Relation Age of Onset    Uterine cancer Mother     Arthritis Mother     Cancer Mother         Uteran    Hearing loss Mother     Heart disease Mother     Hyperlipidemia Mother     Stroke Mother     Vision loss Mother     Asthma Mother     Lung cancer Father     Brain cancer Father 69    Cancer Father         Brain and lung    Hearing loss Father     Breast cancer Sister     Arthritis Sister     Cancer Sister         Breast    Thyroid disease Sister     Brain cancer Paternal Grandmother     Heart disease Sister     Kidney disease Maternal Grandfather     Kidney disease Maternal Uncle     Heart disease Sister        Social History:   Social History     Socioeconomic History    Marital status:    Tobacco Use    Smoking status: Never    Smokeless tobacco: Never   Vaping Use    Vaping status: Never  Used   Substance and Sexual Activity    Alcohol use: Not Currently     Comment: twice per year    Drug use: Never    Sexual activity: Yes     Partners: Male     Birth control/protection: Vasectomy, Hysterectomy     Comment: Only with  of 29 years       Medications:     Current Outpatient Medications:     Insulin Glargine (LANTUS SOLOSTAR) 100 UNIT/ML injection pen, Inject 50 Units under the skin into the appropriate area as directed Every Night for 180 days., Disp: 15 mL, Rfl: 5    cyclobenzaprine (FLEXERIL) 10 MG tablet, Take 1 tablet by mouth 2 (Two) Times a Day As Needed for Muscle Spasms., Disp: 180 tablet, Rfl: 1    dronabinol (MARINOL) 5 MG capsule, 1 capsule Daily., Disp: , Rfl:     empagliflozin (Jardiance) 25 MG tablet tablet, Take 1 tablet by mouth Every Morning., Disp: 90 tablet, Rfl: 1    escitalopram (LEXAPRO) 10 MG tablet, Take 1 tablet by mouth Daily., Disp: 90 tablet, Rfl: 3    famotidine (PEPCID) 40 MG tablet, Take 1 tablet by mouth Daily., Disp: 90 tablet, Rfl: 1    gabapentin (NEURONTIN) 600 MG tablet, Take 1 tablet by mouth 3 (Three) Times a Day., Disp: 270 tablet, Rfl: 1    Insulin Pen Needle (Pen Needles) 31G X 8 MM misc, Use 1 each Daily., Disp: 90 each, Rfl: 1    levocetirizine (XYZAL) 5 MG tablet, Take 1 tablet by mouth Every Evening., Disp: , Rfl:     levothyroxine (SYNTHROID, LEVOTHROID) 25 MCG tablet, (Prior Auth: Rx Ref#:[id number]) Oral for 90, Disp: , Rfl:     metFORMIN (GLUCOPHAGE) 1000 MG tablet, Take 1 tablet by mouth Daily With Breakfast & Dinner., Disp: 180 tablet, Rfl: 1    mometasone (ELOCON) 0.1 % cream, Apply 1 Application topically to the appropriate area as directed., Disp: , Rfl:     Ozempic, 2 MG/DOSE, 8 MG/3ML solution pen-injector, INJECT 2 MG UNDER THE SKIN ONCE WEEKLY, Disp: 9 mL, Rfl: 1    pantoprazole (PROTONIX) 40 MG EC tablet, Take 1 tablet by mouth Daily., Disp: 90 tablet, Rfl: 1    SUMAtriptan (Imitrex) 100 MG tablet, Take one tablet at onset of  headache. May repeat dose one time in 2 hours if headache not relieved., Disp: 9 tablet, Rfl: 3    traMADol (ULTRAM) 50 MG tablet, Take 1 tablet by mouth 3 (Three) Times a Day., Disp: 270 tablet, Rfl: 1    triamcinolone (KENALOG) 0.1 % ointment, Apply To Affected Area As Needed, Disp: , Rfl:     Allergies:   Allergies   Allergen Reactions    Codeine GI Intolerance    Morphine GI Intolerance, Other (See Comments) and Unknown - High Severity    Hydrocodone-Acetaminophen Itching    Cyclophosphamide Hives and Unknown - High Severity    Fluticasone Other (See Comments)    Ginger Other (See Comments)    Taxotere [Docetaxel] Hives    Vilanterol Other (See Comments)    Zyrtec [Cetirizine] Rash       Objective     Physical Exam:  Vital Signs:   Vitals:    05/13/25 1041   BP: 120/84   Pulse: 98   SpO2: 94%   Weight: 77.1 kg (170 lb)     Body mass index is 27.44 kg/m².     Physical Exam  Vitals and nursing note reviewed.   Constitutional:       General: She is not in acute distress.     Appearance: Normal appearance. She is not ill-appearing.   HENT:      Head: Normocephalic and atraumatic.      Right Ear: Tympanic membrane and ear canal normal.      Left Ear: Tympanic membrane and ear canal normal.      Nose: Nose normal.   Cardiovascular:      Rate and Rhythm: Normal rate and regular rhythm.      Heart sounds: Normal heart sounds.   Pulmonary:      Effort: Pulmonary effort is normal.      Breath sounds: Normal breath sounds.   Neurological:      Mental Status: She is alert and oriented to person, place, and time. Mental status is at baseline.   Psychiatric:         Mood and Affect: Mood normal.       Physical Exam      Procedures    Results  Labs   - A1c: 02/2025, 9    Imaging   - X-ray of the shoulder: Some arthritis    Diagnostic Testing   - Echo: Normal  Assessment / Plan      Assessment/Plan:   Diagnoses and all orders for this visit:    1. Pain of left upper arm    2. Grief reaction    3. Type 2 diabetes mellitus  without complication, without long-term current use of insulin  -     Insulin Glargine (LANTUS SOLOSTAR) 100 UNIT/ML injection pen; Inject 50 Units under the skin into the appropriate area as directed Every Night for 180 days.  Dispense: 15 mL; Refill: 5  -     Comprehensive Metabolic Panel  -     Hemoglobin A1c    4. Acquired hypothyroidism  -     TSH    5. Vitamin D deficiency  -     Vitamin D,25-Hydroxy       Assessment & Plan  1. Left shoulder pain.  - The patient reports ongoing left shoulder pain, which has been evaluated with x-rays showing some arthritis.  - Physical therapy was started last week but has exacerbated the pain.  - An MRI is planned for next month if physical therapy does not improve symptoms.  - Continued monitoring of pain and function with physical therapy and potential MRI.    2. Neck pain.  - The patient experiences neck pain following RFA procedures, likely affecting the occipital nerve.  - Trigger point injections have been administered but have not provided significant relief.  - Further evaluation and management are needed.  - Continued monitoring of pain and response to trigger point injections.    3. Diabetes mellitus.  - The patient's blood glucose levels have been fluctuating, potentially due to steroid injections.  - A1c was 9 in 02/2025.  - The dosage of Lantus will be adjusted to provide more flexibility, allowing for an increase up to 50 units if necessary.  - A1c levels will be rechecked to assess current control. The patient is advised to maintain adequate hydration and monitor blood glucose levels closely.    4. Sleep apnea.  - The patient has a severe deviated septum and asymmetrical jaw, complicating CPAP mask fitting.  - Rhinoplasty is being considered to improve nasal airflow.  - The patient is advised to continue using CPAP and follow up with ENT and oral surgery specialists for further management.  - Continued monitoring of sleep quality and breathing with CPAP use and  potential surgical intervention.    5. Leg cramps.  - The patient reports worsening leg cramps despite taking over-the-counter supplements and staying hydrated.  - Electrolyte powder has been somewhat helpful during the day.  - Thyroid function tests will be ordered to rule out any contributing factors.  - Continued monitoring of leg cramps and response to electrolyte supplementation.    6. Hot flashes.  - The patient experiences hot flashes, possibly related to thyroid function or menopause.  - Thyroid function tests will be ordered to assess for any abnormalities.  - Continued monitoring of hot flashes and potential thyroid dysfunction.    7. Bicuspid aortic valve.  - The patient has a bicuspid aortic valve and is under regular surveillance with annual follow-ups and echocardiograms as needed.  - Continued monitoring of heart function and blood pressure control.  - Annual follow-ups and echocardiograms as indicated.  - Counseling on lifestyle modifications to support heart health.        Follow Up:   No follow-ups on file.    Jason Benson  [facility] [place] [facility] [place]     Patient or patient representative verbalized consent for the use of Ambient Listening during the visit with  Jason Benson MD for chart documentation. 5/13/2025  12:39 EDT

## 2025-05-14 LAB
25(OH)D3+25(OH)D2 SERPL-MCNC: 15.3 NG/ML (ref 30–100)
ALBUMIN SERPL-MCNC: 4.5 G/DL (ref 3.8–4.9)
ALP SERPL-CCNC: 129 IU/L (ref 44–121)
ALT SERPL-CCNC: 17 IU/L (ref 0–32)
AST SERPL-CCNC: 17 IU/L (ref 0–40)
BILIRUB SERPL-MCNC: 0.3 MG/DL (ref 0–1.2)
BUN SERPL-MCNC: 27 MG/DL (ref 6–24)
BUN/CREAT SERPL: 36 (ref 9–23)
CALCIUM SERPL-MCNC: 10 MG/DL (ref 8.7–10.2)
CHLORIDE SERPL-SCNC: 103 MMOL/L (ref 96–106)
CO2 SERPL-SCNC: 19 MMOL/L (ref 20–29)
CREAT SERPL-MCNC: 0.74 MG/DL (ref 0.57–1)
EGFRCR SERPLBLD CKD-EPI 2021: 95 ML/MIN/1.73
GLOBULIN SER CALC-MCNC: 2.5 G/DL (ref 1.5–4.5)
GLUCOSE SERPL-MCNC: 109 MG/DL (ref 70–99)
HBA1C MFR BLD: 7.3 % (ref 4.8–5.6)
POTASSIUM SERPL-SCNC: 4.6 MMOL/L (ref 3.5–5.2)
PROT SERPL-MCNC: 7 G/DL (ref 6–8.5)
SODIUM SERPL-SCNC: 139 MMOL/L (ref 134–144)
TSH SERPL DL<=0.005 MIU/L-ACNC: 3.84 UIU/ML (ref 0.45–4.5)

## 2025-05-19 ENCOUNTER — OFFICE VISIT (OUTPATIENT)
Dept: OBSTETRICS AND GYNECOLOGY | Facility: CLINIC | Age: 55
End: 2025-05-19
Payer: COMMERCIAL

## 2025-05-19 VITALS
BODY MASS INDEX: 27.97 KG/M2 | WEIGHT: 174 LBS | HEIGHT: 66 IN | DIASTOLIC BLOOD PRESSURE: 68 MMHG | SYSTOLIC BLOOD PRESSURE: 104 MMHG

## 2025-05-19 DIAGNOSIS — C50.412 MALIGNANT NEOPLASM OF UPPER-OUTER QUADRANT OF LEFT BREAST IN FEMALE, ESTROGEN RECEPTOR POSITIVE: ICD-10-CM

## 2025-05-19 DIAGNOSIS — Z01.419 ENCOUNTER FOR ANNUAL ROUTINE GYNECOLOGICAL EXAMINATION: Primary | ICD-10-CM

## 2025-05-19 DIAGNOSIS — Z78.0 POSTMENOPAUSE: ICD-10-CM

## 2025-05-19 DIAGNOSIS — N95.1 VASOMOTOR SYMPTOMS DUE TO MENOPAUSE: ICD-10-CM

## 2025-05-19 DIAGNOSIS — N95.8 GENITOURINARY SYNDROME OF MENOPAUSE: ICD-10-CM

## 2025-05-19 DIAGNOSIS — N39.41 URGE INCONTINENCE: ICD-10-CM

## 2025-05-19 DIAGNOSIS — Z12.11 SCREEN FOR COLON CANCER: ICD-10-CM

## 2025-05-19 DIAGNOSIS — Z17.0 MALIGNANT NEOPLASM OF UPPER-OUTER QUADRANT OF LEFT BREAST IN FEMALE, ESTROGEN RECEPTOR POSITIVE: ICD-10-CM

## 2025-05-19 PROBLEM — R23.2 HOT FLASHES DUE TO TAMOXIFEN: Status: RESOLVED | Noted: 2020-07-22 | Resolved: 2025-05-19

## 2025-05-19 PROBLEM — T45.1X5A HOT FLASHES DUE TO TAMOXIFEN: Status: RESOLVED | Noted: 2020-07-22 | Resolved: 2025-05-19

## 2025-05-19 RX ORDER — FEZOLINETANT 45 MG/1
45 TABLET, FILM COATED ORAL EVERY 24 HOURS
Qty: 90 TABLET | Refills: 3 | Status: SHIPPED | OUTPATIENT
Start: 2025-05-19

## 2025-05-19 NOTE — ASSESSMENT & PLAN NOTE
Systemic HRT with estrogen contraindicated due to breast cancer history.  Discussed Veozah.  Patient with normal liver function testing.  Discussed monthly testing x 3 months, followed by every 3 months for a year.  No contraindications to Veozah.  Prescription sent

## 2025-05-19 NOTE — PROGRESS NOTES
Gynecologic Annual Exam Note        GYN Annual Exam     CC - Here for annual exam.        HPI  Porsha Bolden is a 55 y.o. female, , who presents for annual well woman exam as a new patient. She is s/p DEANA in  for fibroids. Denies vaginal bleeding.   In the past few years, the patient was diagnosed with deviated nasal septum, bicuspid aortic valve, rotator cuff tendonitis, and sleep apnea.  Marital Status: .  She is sexually active. She has not had new partners. STD testing recommendations have been explained to the patient and she declines STD testing.    The patient would like to discuss the following complaints today: hot flashes, night sweats, and chills have returned (stopped for a year after stopping tamoxifen, then returned in 2024); feet cramps seem to be associated with the sweats and the cramps travel up to the calves. Breast cancer was hormone dependent and patient cannot take HRT    Patient reports urge urinary incontinence that has worsened in the past year. Also reports vaginal dryness (with associated pain on insertion) and decreased libido.    Additional OB/GYN History   On HRT? No    Last Pap: . Results: negative. HPV: unknown .   Last Completed Pap Smear    This patient has no relevant Health Maintenance data.       History of abnormal Pap smear: no  Family history of uterine, colon, breast, or ovarian cancer: yes - uterine cancer mother and breast cancer sister  Performs monthly Self-Breast Exam: yes  Last mammogram: 25. Done at [place]. There is a copy in the chart.    Last Completed Mammogram            Upcoming       MAMMOGRAM (Every 2 Years) Next due on 2025  Mammo Screening Digital Tomosynthesis Bilateral With CAD    2024  Mammo Screening Digital Tomosynthesis Bilateral With CAD    2023  Mammo Diagnostic Digital Tomosynthesis Bilateral With CAD    2021  Mammo Diagnostic Digital Tomosynthesis Bilateral  With CAD    07/22/2020  Mammo Diagnostic Digital Tomosynthesis Bilateral With CAD     Only the first 5 history entries have been loaded, but more history exists.                        Last colonoscopy: has had a colonoscopy pre-2017    Last Completed Colonoscopy    This patient has no relevant Health Maintenance data.         Her last bone density scan was 12/2020 and results were Normal  Exercises Regularly: yes, with physical therapy  Feelings of Anxiety or Depression: yes - anxiety - controlled      Tobacco Usage?: No       Current Outpatient Medications:     cyclobenzaprine (FLEXERIL) 10 MG tablet, Take 1 tablet by mouth 2 (Two) Times a Day As Needed for Muscle Spasms., Disp: 180 tablet, Rfl: 1    dronabinol (MARINOL) 5 MG capsule, 1 capsule Daily., Disp: , Rfl:     escitalopram (LEXAPRO) 10 MG tablet, Take 1 tablet by mouth Daily., Disp: 90 tablet, Rfl: 3    famotidine (PEPCID) 40 MG tablet, Take 1 tablet by mouth Daily., Disp: 90 tablet, Rfl: 1    gabapentin (NEURONTIN) 600 MG tablet, TAKE ONE TABLET BY MOUTH THREE TIMES A DAY, Disp: 270 tablet, Rfl: 1    Insulin Glargine (LANTUS SOLOSTAR) 100 UNIT/ML injection pen, Inject 50 Units under the skin into the appropriate area as directed Every Night for 180 days., Disp: 15 mL, Rfl: 5    Jardiance 25 MG tablet tablet, TAKE ONE TABLET BY MOUTH EVERY MORNING, Disp: 90 tablet, Rfl: 1    levocetirizine (XYZAL) 5 MG tablet, Take 1 tablet by mouth Every Evening., Disp: , Rfl:     levothyroxine (SYNTHROID, LEVOTHROID) 25 MCG tablet, (Prior Auth: Rx Ref#:[id number]) Oral for 90, Disp: , Rfl:     metFORMIN (GLUCOPHAGE) 1000 MG tablet, TAKE ONE TABLET BY MOUTH TWICE A DAY WITH BREAKFAST AND DINNER, Disp: 180 tablet, Rfl: 1    mometasone (ELOCON) 0.1 % cream, Apply 1 Application topically to the appropriate area as directed., Disp: , Rfl:     Ozempic, 2 MG/DOSE, 8 MG/3ML solution pen-injector, INJECT 2 MG UNDER THE SKIN ONCE WEEKLY, Disp: 9 mL, Rfl: 1    pantoprazole  (PROTONIX) 40 MG EC tablet, TAKE ONE TABLET BY MOUTH EVERY DAY, Disp: 90 tablet, Rfl: 1    SUMAtriptan (Imitrex) 100 MG tablet, Take one tablet at onset of headache. May repeat dose one time in 2 hours if headache not relieved., Disp: 9 tablet, Rfl: 3    traMADol (ULTRAM) 50 MG tablet, Take 1 tablet by mouth 3 (Three) Times a Day., Disp: 270 tablet, Rfl: 1    triamcinolone (KENALOG) 0.1 % ointment, Apply To Affected Area As Needed, Disp: , Rfl:     Fezolinetant (Veozah) 45 MG tablet, Take 1 tablet by mouth Daily., Disp: 90 tablet, Rfl: 3    Insulin Pen Needle (Pen Needles) 31G X 8 MM misc, Use 1 each Daily., Disp: 90 each, Rfl: 1    Patient denies the need for medication refills today.    OB History          0    Para   0    Term   0       0    AB   0    Living   0         SAB   0    IAB   0    Ectopic   0    Molar   0    Multiple   0    Live Births   0                Past Medical History:   Diagnosis Date    Abnormal ECG     Tricuspid heart valve    Allergic     Arthritis     Arthritis of back     Lumbar    Asthma     Bicuspid aortic valve     BRCA negative     Breast cancer, left breast 2018    Bursitis of hip     Cervical disc disorder     Dermatographia 2018    Diabetes mellitus, type II     Fibromyalgia, primary     GERD (gastroesophageal reflux disease)     Gout     Headache     History of chemotherapy     History of kidney stones     History of radiation therapy     History of uterine fibroid     Removed uterus     History of varicella     Childhood    HL (hearing loss)     Hypothyroidism     Knee swelling     Low back pain     Lumbosacral disc disease     Migraine     PONV (postoperative nausea and vomiting)     Rotator cuff tendonitis, left     Scoliosis     Sleep apnea     CPAP not effective for patient after multiple fittings    Subluxation of patella     Tear of meniscus of knee     Left    Tremor     Visual impairment     Vitamin D deficiency         Past Surgical History:    Procedure Laterality Date    BREAST BIOPSY Left     And left laparoscopy    BREAST LUMPECTOMY Left 02/2018    EYE SURGERY      Lasix    KNEE SURGERY Left     x4    LYMPH NODE BIOPSY Left     RADIOFREQUENCY ABLATION      for the neck    TOTAL ABDOMINAL HYSTERECTOMY  2012    for uterine fibroid; ovaries remain    TRIGGER POINT INJECTION      x2, Occipital    WISDOM TOOTH EXTRACTION         Health Maintenance   Topic Date Due    Annual Gynecologic Pelvic and Breast Exam  Never done    DIABETIC FOOT EXAM  Never done    Hepatitis B (1 of 3 - 19+ 3-dose series) Never done    TDAP/TD VACCINES (1 - Tdap) Never done    COLORECTAL CANCER SCREENING  Never done    HEPATITIS C SCREENING  Never done    COVID-19 Vaccine (4 - 2024-25 season) 09/01/2024    ZOSTER VACCINE (2 of 2) 04/08/2025    DXA SCAN  05/19/2025    INFLUENZA VACCINE  07/01/2025    HEMOGLOBIN A1C  11/13/2025    ANNUAL PHYSICAL  02/11/2026    LIPID PANEL  02/11/2026    URINE MICROALBUMIN-CREATININE RATIO (uACR)  02/11/2026    MAMMOGRAM  01/21/2027    Pneumococcal Vaccine 50+  Completed       The additional following portions of the patient's history were reviewed and updated as appropriate: allergies, current medications, past family history, past medical history, past social history, past surgical history, and problem list.    Review of Systems   Constitutional: Negative.    HENT: Negative.     Eyes: Negative.    Respiratory: Negative.     Cardiovascular: Negative.    Gastrointestinal: Negative.    Endocrine: Heat intolerance: hot flashes and night sweats.   Genitourinary:  Positive for decreased libido and dyspareunia. Bladder incontinence: urge incontinence.  Musculoskeletal:  Positive for back pain.   Skin: Negative.    Allergic/Immunologic: Negative.    Neurological: Negative.    Hematological: Negative.    Psychiatric/Behavioral: Negative.         I have reviewed and agree with the HPI, ROS, and historical information as entered above. Wenceslao Orozco MD  "    Objective   /68 (BP Location: Right arm, Patient Position: Sitting, Cuff Size: Adult)   Ht 167.6 cm (66\")   Wt 78.9 kg (174 lb)   BMI 28.08 kg/m²     Physical Exam  Constitutional:       General: She is not in acute distress.     Appearance: Normal appearance.   Genitourinary:      Vulva, bladder and urethral meatus normal.      No lesions in the vagina.      Vaginal cuff intact.     No vaginal discharge, tenderness or ulceration.      No vaginal prolapse present.     Moderate vaginal atrophy present.       Right Adnexa: not tender, not full, not palpable and no mass present.     Left Adnexa: not tender, not full, not palpable and no mass present.     Cervix is absent.      Uterus is absent.      Bladder urgency on palpation not present.    Breasts:     Breasts are symmetrical.      Right: Normal. No mass, nipple discharge, skin change or tenderness.      Left: Normal. No mass, nipple discharge, skin change or tenderness.   HENT:      Head: Normocephalic and atraumatic.   Pulmonary:      Effort: Pulmonary effort is normal.   Abdominal:      General: Abdomen is flat. There is no distension.      Palpations: Abdomen is soft. There is no mass.      Tenderness: There is no abdominal tenderness.   Neurological:      General: No focal deficit present.      Mental Status: She is alert.   Skin:     General: Skin is warm and dry.   Vitals and nursing note reviewed. Exam conducted with a chaperone present.              Assessment and Plan    Problem List Items Addressed This Visit       Malignant neoplasm of upper-outer quadrant of left breast in female, estrogen receptor positive    Urge incontinence    Genitourinary syndrome of menopause          Current Assessment & Plan    Discussed hyaluronic acid as first-line therapy.  Samples provided.  Discussed possible use of vaginal estrogen.  We discussed that the position of the [employer] Menopause Society and ACOG is that vaginal estrogen can be considered in " patients with ER positive breast cancer.  I recommend the patient follow-up with her oncologist regarding his opinion on the safety of this medication.  We discussed that Veozah, while incredibly beneficial for hot flashes, has no impact on vulvovaginal symptoms.        Vasomotor symptoms due to menopause    Current Assessment & Plan   Systemic HRT with estrogen contraindicated due to breast cancer history.  Discussed Veozah.  Patient with normal liver function testing.  Discussed monthly testing x 3 months, followed by every 3 months for a year.  No contraindications to Veozah.  Prescription sent         Relevant Medications    Fezolinetant (Veozah) 45 MG tablet    Other Relevant Orders    Comprehensive Metabolic Panel    Comprehensive Metabolic Panel    Comprehensive Metabolic Panel     Other Visit Diagnoses         Encounter for annual routine gynecological examination    -  Primary      Screen for colon cancer        Relevant Orders    Ambulatory Referral For Screening Colonoscopy      Postmenopause        Relevant Orders    DEXA Bone Density Axial            GYN annual well woman exam.   Reviewed monthly self breast exams.  Instructed to call with lumps, pain, or breast discharge.  Yearly mammograms ordered.  Osteoporosis screening ordered today.  Reviewed exercise as a preventative health measures.   Reviewed BMI and weight loss as preventative health measures.   Colonoscopy recommended.  Symptoms of menopausal transition reviewed with patient.  Reviewed risks/benefits of OTC, non-hormonal and hormonal treatment for vasomotor and other menopausal symptoms.  Return in about 6 months (around 11/19/2025) for GYN Follow Up.         Wenceslao Orozco MD  05/19/2025

## 2025-05-19 NOTE — ASSESSMENT & PLAN NOTE
Discussed hyaluronic acid as first-line therapy.  Samples provided.  Discussed possible use of vaginal estrogen.  We discussed that the position of the [employer] Menopause Society and ACOG is that vaginal estrogen can be considered in patients with ER positive breast cancer.  I recommend the patient follow-up with her oncologist regarding his opinion on the safety of this medication.  We discussed that Veozah, while incredibly beneficial for hot flashes, has no impact on vulvovaginal symptoms.

## 2025-05-20 ENCOUNTER — RESULTS FOLLOW-UP (OUTPATIENT)
Dept: FAMILY MEDICINE CLINIC | Facility: CLINIC | Age: 55
End: 2025-05-20
Payer: COMMERCIAL

## 2025-05-20 ENCOUNTER — TELEPHONE (OUTPATIENT)
Dept: OBSTETRICS AND GYNECOLOGY | Facility: CLINIC | Age: 55
End: 2025-05-20
Payer: COMMERCIAL

## 2025-05-20 NOTE — TELEPHONE ENCOUNTER
(Key: [id number])  PA Case ID #: [id number]  Rx #: [id number] Drug  Veozah 45MG tablets Form  [employer]  Outcome  Approved today by [employer] 2017  PA Case: [id number], Status: Approved, Coverage Starts on: 5/20/2025 12:00:00 AM, Coverage Ends on: 5/20/2026 12:00:00 AM.  Effective Date: 5/20/2025  Authorization Expiration Date: 5/20/2026

## 2025-05-21 DIAGNOSIS — G43.809 OTHER MIGRAINE WITHOUT STATUS MIGRAINOSUS, NOT INTRACTABLE: ICD-10-CM

## 2025-05-21 RX ORDER — SODIUM, POTASSIUM,MAG SULFATES 17.5-3.13G
SOLUTION, RECONSTITUTED, ORAL ORAL
Qty: 354 ML | Refills: 0 | Status: SHIPPED | OUTPATIENT
Start: 2025-05-21

## 2025-05-21 RX ORDER — SUMATRIPTAN SUCCINATE 100 MG/1
TABLET ORAL
Qty: 9 TABLET | Refills: 3 | Status: SHIPPED | OUTPATIENT
Start: 2025-05-21

## 2025-06-04 ENCOUNTER — OUTSIDE FACILITY SERVICE (OUTPATIENT)
Dept: GASTROENTEROLOGY | Facility: CLINIC | Age: 55
End: 2025-06-04
Payer: COMMERCIAL

## 2025-06-24 ENCOUNTER — OFFICE VISIT (OUTPATIENT)
Dept: ORTHOPEDIC SURGERY | Facility: CLINIC | Age: 55
End: 2025-06-24
Payer: COMMERCIAL

## 2025-06-24 VITALS
SYSTOLIC BLOOD PRESSURE: 112 MMHG | BODY MASS INDEX: 27.97 KG/M2 | WEIGHT: 174 LBS | HEIGHT: 66 IN | DIASTOLIC BLOOD PRESSURE: 66 MMHG

## 2025-06-24 DIAGNOSIS — M19.012 GLENOHUMERAL ARTHRITIS, LEFT: ICD-10-CM

## 2025-06-24 DIAGNOSIS — M25.512 LEFT SHOULDER PAIN, UNSPECIFIED CHRONICITY: ICD-10-CM

## 2025-06-24 DIAGNOSIS — M75.82 ROTATOR CUFF TENDONITIS, LEFT: Primary | ICD-10-CM

## 2025-06-24 RX ORDER — CELECOXIB 200 MG/1
CAPSULE ORAL EVERY 24 HOURS
COMMUNITY

## 2025-06-24 NOTE — PROGRESS NOTES
[facility] Follow Up       Office Follow Up Visit       Patient Name: Porsha Bolden    Chief Complaint:   Chief Complaint   Patient presents with    Follow-up     8 week follow up - Left shoulder pain, unspecified chronicity       Referring Physician: No ref. provider found    History of Present Illness:   Porsha Bolden returns to clinic today for f/up left shoulder.  She reports pain is actually worse than it was at her last visit.  She is having pain with lifting, overhead activity, night pain.  She did PT with worsening pain.  She has maintained motion.  Since last visit her A1C has improved and her insulin has been adjusted.    Subjective     Review of Systems   Constitutional:  Negative for chills, fever, unexpected weight gain and unexpected weight loss.   HENT:  Negative for congestion, postnasal drip and rhinorrhea.    Eyes:  Negative for blurred vision.   Respiratory:  Negative for shortness of breath.    Cardiovascular:  Negative for leg swelling.   Gastrointestinal:  Negative for abdominal pain, nausea and vomiting.   Genitourinary:  Negative for difficulty urinating.   Musculoskeletal:  Positive for arthralgias. Negative for gait problem, joint swelling and myalgias.   Skin:  Negative for skin lesions and wound.   Neurological:  Negative for dizziness, weakness, light-headedness and numbness.   Hematological:  Does not bruise/bleed easily.   Psychiatric/Behavioral:  Negative for depressed mood.         I have reviewed and updated the following portions of the patient's history and review of systems: allergies, current medications, past family history, past medical history, past social history, past surgical history and problem list.    Medications:   Current Outpatient Medications:     celecoxib (CeleBREX) 200 MG capsule, Daily., Disp: , Rfl:     cyclobenzaprine (FLEXERIL) 10 MG tablet, Take 1 tablet by mouth 2 (Two)  Times a Day As Needed for Muscle Spasms., Disp: 180 tablet, Rfl: 1    escitalopram (LEXAPRO) 10 MG tablet, Take 1 tablet by mouth Daily., Disp: 90 tablet, Rfl: 3    famotidine (PEPCID) 40 MG tablet, Take 1 tablet by mouth Daily., Disp: 90 tablet, Rfl: 1    Fezolinetant (Veozah) 45 MG tablet, Take 1 tablet by mouth Daily., Disp: 90 tablet, Rfl: 3    gabapentin (NEURONTIN) 600 MG tablet, TAKE ONE TABLET BY MOUTH THREE TIMES A DAY, Disp: 270 tablet, Rfl: 1    Insulin Glargine (LANTUS SOLOSTAR) 100 UNIT/ML injection pen, Inject 50 Units under the skin into the appropriate area as directed Every Night for 180 days., Disp: 15 mL, Rfl: 5    Jardiance 25 MG tablet tablet, TAKE ONE TABLET BY MOUTH EVERY MORNING, Disp: 90 tablet, Rfl: 1    levocetirizine (XYZAL) 5 MG tablet, Take 1 tablet by mouth Every Evening., Disp: , Rfl:     levothyroxine (SYNTHROID, LEVOTHROID) 25 MCG tablet, (Prior Auth: Rx Ref#:[id number]) Oral for 90, Disp: , Rfl:     metFORMIN (GLUCOPHAGE) 1000 MG tablet, TAKE ONE TABLET BY MOUTH TWICE A DAY WITH BREAKFAST AND DINNER, Disp: 180 tablet, Rfl: 1    mometasone (ELOCON) 0.1 % cream, Apply 1 Application topically to the appropriate area as directed., Disp: , Rfl:     Ozempic, 2 MG/DOSE, 8 MG/3ML solution pen-injector, INJECT 2 MG UNDER THE SKIN ONCE WEEKLY, Disp: 9 mL, Rfl: 1    pantoprazole (PROTONIX) 40 MG EC tablet, TAKE ONE TABLET BY MOUTH EVERY DAY, Disp: 90 tablet, Rfl: 1    sodium-potassium-magnesium sulfates (Suprep Bowel Prep Kit) 17.5-3.13-1.6 GM/177ML solution oral solution, Take 1st dose at 5pm. Take 2nd dose at 10pm. Nothing to eat/drink after midnight. Follow instructions provided by office. May substitute with Moviprep or Golytely., Disp: 354 mL, Rfl: 0    SUMAtriptan (IMITREX) 100 MG tablet, TAKE ONE TABLET BY MOUTH EVERY DAY AT ONSET OF HEADACHE. MAY REPEAT DOSE ONE TIME IN 2 HOURS IF HEADACHE NOT RELIEVED., Disp: 9 tablet, Rfl: 3    traMADol (ULTRAM) 50 MG tablet, Take 1 tablet by  "mouth 3 (Three) Times a Day., Disp: 270 tablet, Rfl: 1    triamcinolone (KENALOG) 0.1 % ointment, Apply To Affected Area As Needed, Disp: , Rfl:     BD Pen Needle Short Ultrafine 31G X 8 MM misc, USE AS DIRECTED ONCE DAILY, Disp: 90 each, Rfl: 1    Allergies:   Allergies   Allergen Reactions    Vilanterol Shortness Of Breath     Asthma medication - swelling and inability to breathe    Codeine GI Intolerance    Fluticasone Swelling     facial    Ginger Itching and Swelling     Mouth and lips itch    Hydrocodone-Acetaminophen Itching    Morphine GI Intolerance    Cyclophosphamide Hives    Taxotere [Docetaxel] Hives    Zyrtec [Cetirizine] Rash         Objective      Vital Signs:   Vitals:    06/24/25 1518   BP: 112/66   Weight: 78.9 kg (174 lb)   Height: 167.6 cm (66\")       Ortho Exam:  Left shoulder exam: tender over the anterolateral acromion and posterior jointline.  ROM , abduction 180 ER 80. Positive empty can, positive kelly. 5/5 cuff strength    Results Review:  XR Shoulder 2+ View Left  Indication: left shoulder pain.      Views:AP lateral and scapular Y views of the shoulder are submitted.    Impression: There is no fracture subluxation or dislocation. The   glenohumeral joint space is reduced with inferior humeral head   osteophytes. Mild degenerative changes of the AC joint. There are no acute   findings.    Comparison: none.        XR Humerus Left (In Office)  Result Date: 4/15/2025  Impression: No significant radiographic abnormality of the left humerus. Electronically Signed: Herlinda Pryor MD  4/15/2025 8:05 AM EDT  Workstation ID: [id number]        Assessment / Plan      Assessment:   Diagnoses and all orders for this visit:    1. Rotator cuff tendonitis, left (Primary)    2. Left shoulder pain, unspecified chronicity    3. Glenohumeral arthritis, left        Quality Metrics:   BMI:   BMI is >= 25 and <30. (Overweight) The following options were offered after discussion;: Information on healthy " weight added to patient's after visit summary.       Tobacco:   Porsha Bolden  reports that she has never smoked. She has never used smokeless tobacco. I     Plan:   Right rotator cuff tendonitis with GH arthritis and worsening pain despite PT, activity modification, OTC medication.  We have been unable to do injection secondary to elevated A1C.  We discussed further treatment including continued observation vs further imaging with MRI. We will proceed with MRI of the left shoulder for further evaluation.  MRI is critical for surgical planning if indicated and guiding further treatment options.  I will see her back following the MRI, sooner if needed.    Anabela Jones PA-C  [facility]    Dictated using Dragon Speech Recognition.

## 2025-06-25 RX ORDER — PEN NEEDLE, DIABETIC 31 GX5/16"
NEEDLE, DISPOSABLE MISCELLANEOUS DAILY
Qty: 90 EACH | Refills: 1 | Status: SHIPPED | OUTPATIENT
Start: 2025-06-25

## 2025-06-26 DIAGNOSIS — K21.9 GASTROESOPHAGEAL REFLUX DISEASE WITHOUT ESOPHAGITIS: ICD-10-CM

## 2025-06-26 RX ORDER — FAMOTIDINE 40 MG/1
40 TABLET, FILM COATED ORAL DAILY
Qty: 90 TABLET | Refills: 1 | OUTPATIENT
Start: 2025-06-26

## 2025-07-08 DIAGNOSIS — M75.82 ROTATOR CUFF TENDONITIS, LEFT: Primary | ICD-10-CM

## 2025-07-08 DIAGNOSIS — M19.012 GLENOHUMERAL ARTHRITIS, LEFT: ICD-10-CM

## 2025-07-08 DIAGNOSIS — M54.50 ACUTE LOW BACK PAIN WITHOUT SCIATICA, UNSPECIFIED BACK PAIN LATERALITY: ICD-10-CM

## 2025-07-08 DIAGNOSIS — M25.512 LEFT SHOULDER PAIN, UNSPECIFIED CHRONICITY: ICD-10-CM

## 2025-07-09 DIAGNOSIS — M54.50 ACUTE LOW BACK PAIN WITHOUT SCIATICA, UNSPECIFIED BACK PAIN LATERALITY: ICD-10-CM

## 2025-07-10 DIAGNOSIS — M54.50 ACUTE LOW BACK PAIN WITHOUT SCIATICA, UNSPECIFIED BACK PAIN LATERALITY: ICD-10-CM

## 2025-07-10 RX ORDER — TRAMADOL HYDROCHLORIDE 50 MG/1
50 TABLET ORAL 3 TIMES DAILY
Qty: 270 TABLET | Refills: 0 | OUTPATIENT
Start: 2025-07-10

## 2025-07-10 RX ORDER — TRAMADOL HYDROCHLORIDE 50 MG/1
50 TABLET ORAL 3 TIMES DAILY
Qty: 270 TABLET | Refills: 1 | OUTPATIENT
Start: 2025-07-10

## 2025-07-10 RX ORDER — TRAMADOL HYDROCHLORIDE 50 MG/1
50 TABLET ORAL 3 TIMES DAILY
Qty: 270 TABLET | Refills: 0 | Status: SHIPPED | OUTPATIENT
Start: 2025-07-10

## 2025-07-10 RX ORDER — TRAMADOL HYDROCHLORIDE 50 MG/1
50 TABLET ORAL 3 TIMES DAILY
Qty: 90 TABLET | Refills: 0 | OUTPATIENT
Start: 2025-07-10

## 2025-07-10 NOTE — TELEPHONE ENCOUNTER
Caller: Yuan Doradowilder Dhaliwal    Relationship: Self    Best call back number: [phone]    Requested Prescriptions:   Requested Prescriptions     Pending Prescriptions Disp Refills    traMADol (ULTRAM) 50 MG tablet [Pharmacy Med Name: TRAMADOL HCL 50MG TABS] 90 tablet 0     Sig: TAKE ONE TABLET BY MOUTH THREE TIMES A DAY    traMADol (ULTRAM) 50 MG tablet 270 tablet 0     Sig: Take 1 tablet by mouth 3 (Three) Times a Day.        Pharmacy where request should be sent: [facility], [address] [phone] [place] [phone]      Last office visit with prescribing clinician: Visit date not found   Last telemedicine visit with prescribing clinician: Visit date not found   Next office visit with prescribing clinician: Visit date not found         Does the patient have less than a 3 day supply:  [x] Yes  [] No    Renard Lyon Rep   07/10/25 14:55 EDT

## 2025-07-18 ENCOUNTER — HOSPITAL ENCOUNTER (OUTPATIENT)
Dept: MRI IMAGING | Facility: HOSPITAL | Age: 55
Discharge: HOME | End: 2025-07-18
Admitting: PHYSICIAN ASSISTANT
Payer: COMMERCIAL

## 2025-07-18 DIAGNOSIS — M25.512 LEFT SHOULDER PAIN, UNSPECIFIED CHRONICITY: ICD-10-CM

## 2025-07-18 DIAGNOSIS — M75.82 ROTATOR CUFF TENDONITIS, LEFT: ICD-10-CM

## 2025-07-18 DIAGNOSIS — M19.012 GLENOHUMERAL ARTHRITIS, LEFT: ICD-10-CM

## 2025-07-18 PROCEDURE — 73221 MRI JOINT UPR EXTREM W/O DYE: CPT

## 2025-07-22 ENCOUNTER — OFFICE VISIT (OUTPATIENT)
Dept: ORTHOPEDIC SURGERY | Facility: CLINIC | Age: 55
End: 2025-07-22
Payer: COMMERCIAL

## 2025-07-22 VITALS
WEIGHT: 174 LBS | DIASTOLIC BLOOD PRESSURE: 82 MMHG | SYSTOLIC BLOOD PRESSURE: 128 MMHG | BODY MASS INDEX: 27.97 KG/M2 | HEIGHT: 66 IN

## 2025-07-22 DIAGNOSIS — M75.82 ROTATOR CUFF TENDONITIS, LEFT: Primary | ICD-10-CM

## 2025-07-22 DIAGNOSIS — M19.012 GLENOHUMERAL ARTHRITIS, LEFT: ICD-10-CM

## 2025-07-22 DIAGNOSIS — M25.512 LEFT SHOULDER PAIN, UNSPECIFIED CHRONICITY: ICD-10-CM

## 2025-07-22 DIAGNOSIS — M75.82 ROTATOR CUFF TENDONITIS, LEFT: ICD-10-CM

## 2025-07-22 DIAGNOSIS — M65.912 SYNOVITIS OF LEFT SHOULDER: ICD-10-CM

## 2025-07-22 DIAGNOSIS — Z82.61 FAMILY HISTORY OF INFLAMMATORY ARTHRITIS: ICD-10-CM

## 2025-07-22 DIAGNOSIS — Z82.61 FAMILY HISTORY OF INFLAMMATORY ARTHRITIS: Primary | ICD-10-CM

## 2025-07-22 RX ADMIN — LIDOCAINE HYDROCHLORIDE 4 ML: 10 INJECTION, SOLUTION EPIDURAL; INFILTRATION; INTRACAUDAL; PERINEURAL at 12:10

## 2025-07-22 RX ADMIN — DEXAMETHASONE SODIUM PHOSPHATE 4 MG: 4 INJECTION, SOLUTION INTRA-ARTICULAR; INTRALESIONAL; INTRAMUSCULAR; INTRAVENOUS; SOFT TISSUE at 12:10

## 2025-07-22 NOTE — PROGRESS NOTES
[facility] [place] [facility] Office Follow Up       Office Follow Up Visit       Patient Name: Porsha Bolden    Chief Complaint:   Chief Complaint   Patient presents with    Follow-up     4 wk f/u- Rotator cuff tendonitis, left  (MRI 7/18/25)       Referring Physician: No ref. provider found    History of Present Illness:   Porsha Bolden returns to clinic today for follow-up left shoulder MRI.  She reports no change in her pain.  She continues to have pain with any motion of the shoulder.  She has done physical therapy in the past with worsening pain.  She reports no known personal history of an inflammatory disease but she does have family history.  She reports in [place] about 10 years ago they began working her up for an inflammatory disease but she moved and when she moved here it was disregarded.      Subjective     Review of Systems     I have reviewed and updated the following portions of the patient's history and review of systems: allergies, current medications, past family history, past medical history, past social history, past surgical history and problem list.    Medications:   Current Outpatient Medications:     BD Pen Needle Short Ultrafine 31G X 8 MM misc, USE AS DIRECTED ONCE DAILY, Disp: 90 each, Rfl: 1    celecoxib (CeleBREX) 200 MG capsule, Daily., Disp: , Rfl:     cyclobenzaprine (FLEXERIL) 10 MG tablet, Take 1 tablet by mouth 2 (Two) Times a Day As Needed for Muscle Spasms., Disp: 180 tablet, Rfl: 1    escitalopram (LEXAPRO) 10 MG tablet, Take 1 tablet by mouth Daily., Disp: 90 tablet, Rfl: 3    famotidine (PEPCID) 40 MG tablet, Take 1 tablet by mouth Daily., Disp: 90 tablet, Rfl: 1    Fezolinetant (Veozah) 45 MG tablet, Take 1 tablet by mouth Daily., Disp: 90 tablet, Rfl: 3    gabapentin (NEURONTIN) 600 MG tablet, TAKE ONE TABLET BY MOUTH THREE TIMES A DAY, Disp: 270 tablet, Rfl: 1    Insulin Glargine (LANTUS SOLOSTAR) 100 UNIT/ML  "injection pen, Inject 50 Units under the skin into the appropriate area as directed Every Night for 180 days., Disp: 15 mL, Rfl: 5    Jardiance 25 MG tablet tablet, TAKE ONE TABLET BY MOUTH EVERY MORNING, Disp: 90 tablet, Rfl: 1    levocetirizine (XYZAL) 5 MG tablet, Take 1 tablet by mouth Every Evening., Disp: , Rfl:     levothyroxine (SYNTHROID, LEVOTHROID) 25 MCG tablet, (Prior Auth: Rx Ref#:[id number]) Oral for 90, Disp: , Rfl:     metFORMIN (GLUCOPHAGE) 1000 MG tablet, TAKE ONE TABLET BY MOUTH TWICE A DAY WITH BREAKFAST AND DINNER, Disp: 180 tablet, Rfl: 1    mometasone (ELOCON) 0.1 % cream, Apply 1 Application topically to the appropriate area as directed., Disp: , Rfl:     Ozempic, 2 MG/DOSE, 8 MG/3ML solution pen-injector, INJECT 2 MG UNDER THE SKIN ONCE WEEKLY, Disp: 9 mL, Rfl: 1    pantoprazole (PROTONIX) 40 MG EC tablet, TAKE ONE TABLET BY MOUTH EVERY DAY, Disp: 90 tablet, Rfl: 1    SUMAtriptan (IMITREX) 100 MG tablet, TAKE ONE TABLET BY MOUTH EVERY DAY AT ONSET OF HEADACHE. MAY REPEAT DOSE ONE TIME IN 2 HOURS IF HEADACHE NOT RELIEVED., Disp: 9 tablet, Rfl: 3    traMADol (ULTRAM) 50 MG tablet, Take 1 tablet by mouth 3 (Three) Times a Day., Disp: 270 tablet, Rfl: 0    triamcinolone (KENALOG) 0.1 % ointment, Apply To Affected Area As Needed, Disp: , Rfl:     Allergies:   Allergies   Allergen Reactions    Vilanterol Shortness Of Breath     Asthma medication - swelling and inability to breathe    Codeine GI Intolerance    Fluticasone Swelling     facial    Ginger Itching and Swelling     Mouth and lips itch    Hydrocodone-Acetaminophen Itching    Morphine GI Intolerance    Cyclophosphamide Hives    Taxotere [Docetaxel] Hives    Zyrtec [Cetirizine] Rash         Objective      Vital Signs:   Vitals:    07/22/25 1117   BP: 128/82   Weight: 78.9 kg (174 lb)   Height: 167.6 cm (65.98\")       Ortho Exam:  Right shoulder exam: Decreased range of motion with 5/5 rotator cuff strength.  Tender over the anterior " shoulder, medial scapular tenderness    Results Review:  MRI Shoulder Left Without Contrast  Narrative: MRI SHOULDER LEFT WO CONTRAST    Date of Exam: 7/18/2025 10:15 AM EDT    Indication: pain, failed pt.     Comparison: None available.    Technique:  Routine multiplanar/multisequence images of the left shoulder were obtained without contrast administration.      Findings:  Changes of tendinopathy are seen throughout the rotator cuff. There is evidence for ill-defined partial-thickness tear throughout the distal supraspinatus component of the cuff. There is also likely a small focal full-thickness tear involving the distal   attachment the anterior fibers of the supraspinatus component. There is no significant retraction. Increased overlying bursal fluid is noted. There is likely additional partial-thickness undersurface tear and intrasubstance tear involving the   subscapularis component of the cuff.    There is abnormal signal involving the proximal intra-articular biceps tendon extending into the intertubercular sulcus. The findings suggest changes of tendinopathy and partial-thickness tear. There is no evidence for complete disruption or distal   retraction. The anchor remains intact. There is evidence for associated biceps pulley/sleeve injury with involvement of the subscapularis.    There is abnormal signal involving the superior labrum extending into the posterior labrum. There is involvement of the entirety of the posterior labrum. There is also evidence for involvement of the anterior labrum. These findings indicate changes of a   SLAP type IX tear.    The glenohumeral joint is intact. Moderate changes of osteoarthritis are noted. There is a large joint effusion. Joint fluid extends into the subcoracoid recess. There is also evidence for intra-articular rice body formation within the axillary recess   and subcoracoid recess related to arthropathy. The presence of rice bodies may indicate underlying  rheumatoid arthritis. Changes of synovial chondromatosis are felt less likely.    The acromioclavicular joint is intact. Mild hypertrophic age-related changes are noted. No other significant focal bone marrow signal is seen. The cortical margins are grossly intact. There is mild atrophy throughout the rotator cuff musculature.  Impression: Impression:  1.Changes of tendinopathy are seen throughout the rotator cuff. There is evidence for ill-defined partial-thickness tear throughout the distal supraspinatus component. There is also likely a small focal full-thickness tear involving the distal attachment   of the anterior fibers of the supraspinatus component.  2.Evidence for additional partial-thickness undersurface and intrasubstance tear involving the subscapularis component.  3.Evidence for tendinopathy and partial-thickness tear involving the proximal biceps tendon. There is no complete disruption or distal retraction. There is evidence for associated biceps pulley/sleeve injury with involvement of the subscapularis.  4.Evidence for a SLAP type IX tear.  5.Moderate changes of arthropathy are noted involving the glenohumeral joint with a component of productive characteristics. There is a large joint effusion with evidence for associated chronic synovial changes and intra-articular rice body formation.   The presence of rice bodies suggests a component of underlying inflammatory arthropathy such as rheumatoid arthritis. Changes secondary to synovial chondromatosis are felt less likely.     Electronically Signed: Shay Mejía MD    7/20/2025 3:10 PM EDT    Workstation ID: [id number]       MRI Shoulder Left Without Contrast  Result Date: 7/20/2025  Impression: 1.Changes of tendinopathy are seen throughout the rotator cuff. There is evidence for ill-defined partial-thickness tear throughout the distal supraspinatus component. There is also likely a small focal full-thickness tear involving the distal attachment  of  the anterior fibers of the supraspinatus component. 2.Evidence for additional partial-thickness undersurface and intrasubstance tear involving the subscapularis component. 3.Evidence for tendinopathy and partial-thickness tear involving the proximal biceps tendon. There is no complete disruption or distal retraction. There is evidence for associated biceps pulley/sleeve injury with involvement of the subscapularis. 4.Evidence for a SLAP type IX tear. 5.Moderate changes of arthropathy are noted involving the glenohumeral joint with a component of productive characteristics. There is a large joint effusion with evidence for associated chronic synovial changes and intra-articular rice body formation. The presence of rice bodies suggests a component of underlying inflammatory arthropathy such as rheumatoid arthritis. Changes secondary to synovial chondromatosis are felt less likely. Electronically Signed: Shay Mejía MD  7/20/2025 3:10 PM EDT  Workstation ID: [id number]    XR Humerus Left (In Office)  Result Date: 4/15/2025  Impression: No significant radiographic abnormality of the left humerus. Electronically Signed: Herlinda Pryor MD  4/15/2025 8:05 AM EDT  Workstation ID: [id number]        Assessment / Plan      Assessment:   Diagnoses and all orders for this visit:    1. Family history of inflammatory arthritis (Primary)  -     Ambulatory Referral to Rheumatology    2. Rotator cuff tendonitis, left  -     Ambulatory Referral to Rheumatology    3. Left shoulder pain, unspecified chronicity  -     Ambulatory Referral to Rheumatology    4. Glenohumeral arthritis, left  -     Ambulatory Referral to Rheumatology    5. Synovitis of left shoulder  -     Ambulatory Referral to Rheumatology    Other orders  -     - Large Joint Arthrocentesis: L glenohumeral        Quality Metrics:   BMI:   BMI is >= 25 and <30. (Overweight) The following options were offered after discussion;: Information on healthy weight added to  patient's after visit summary.       Tobacco:   Porsha Bolden  reports that she has never smoked. She has never used smokeless tobacco.      Plan:  Left shoulder pain with rotator cuff tendinitis, left shoulder synovitis and glenohumeral arthritis.  I reviewed MRI from 7/18/2025, clinical findings past and current treatment the patient.  Dr. Hendricks reviewed MRI for me as well.  The MRI shows significant synovitis with rice bodies.  These may be secondary to an underlying inflammatory disease.  I reassured the patient there is nothing on MRI that is indicative of surgery.  She has good strength.  As noted in HPI, patient has a family history of rheumatoid arthritis as well as had begun rheumatologic workup in [place] approximately 10 years ago.  Recommendation today is referral to rheumatology.  I will order sed rate, CRP, ELIZABETH, rheumatoid factor and anti-CCP.  Following the blood draw, we will proceed with glenohumeral injection as to not skew the labs with the steroid.  She will return  to see me as needed.    I discussed with the patient the potential benefits of performing a therapeutic injection of the left shoulder as well as potential risks including but not limited to infection, swelling, pain, bleeding, bruising, nerve/vessel damage, skin color changes, transient elevation in blood glucose levels, and fat atrophy. After informed consent and verifying correct patient, procedure site, and type of procedure, the area was prepped with Hibiclens, ethyl chloride was used to numb the skin. Via the anterior approach, 4cc of 1% lidocaine and 4 mg of dexamethasone were injected into the left shoulder.  The patient tolerated the procedure well. There were no complications.       Anabela Jones PA-C  [facility] [place] [place] [facility]    Dictated using Dragon Speech Recognition.

## 2025-07-22 NOTE — PROGRESS NOTES
Procedure   - Large Joint Arthrocentesis: L glenohumeral on 7/22/2025 12:10 PM  Indications: pain  Details: 21 G needle, posterior approach  Medications: 4 mL lidocaine PF 1% 1 %; 4 mg dexAMETHasone 4 MG/ML

## 2025-07-23 RX ORDER — LIDOCAINE HYDROCHLORIDE 10 MG/ML
4 INJECTION, SOLUTION EPIDURAL; INFILTRATION; INTRACAUDAL; PERINEURAL
Status: COMPLETED | OUTPATIENT
Start: 2025-07-22 | End: 2025-07-22

## 2025-07-23 RX ORDER — DEXAMETHASONE SODIUM PHOSPHATE 4 MG/ML
4 INJECTION, SOLUTION INTRA-ARTICULAR; INTRALESIONAL; INTRAMUSCULAR; INTRAVENOUS; SOFT TISSUE
Status: COMPLETED | OUTPATIENT
Start: 2025-07-22 | End: 2025-07-22

## 2025-07-24 LAB
ANA SER QL IF: NEGATIVE
CCP IGA+IGG SERPL IA-ACNC: 6 UNITS (ref 0–19)
CRP SERPL-MCNC: 0.35 MG/DL (ref 0–0.5)
ERYTHROCYTE [SEDIMENTATION RATE] IN BLOOD BY WESTERGREN METHOD: 10 MM/HR (ref 0–30)
RHEUMATOID FACT SERPL-ACNC: <10 IU/ML

## 2025-07-28 DIAGNOSIS — M54.50 ACUTE LOW BACK PAIN WITHOUT SCIATICA, UNSPECIFIED BACK PAIN LATERALITY: ICD-10-CM

## 2025-07-29 RX ORDER — CYCLOBENZAPRINE HCL 10 MG
10 TABLET ORAL 2 TIMES DAILY PRN
Qty: 60 TABLET | Refills: 2 | Status: SHIPPED | OUTPATIENT
Start: 2025-07-29

## 2025-08-06 ENCOUNTER — OFFICE VISIT (OUTPATIENT)
Dept: ONCOLOGY | Facility: CLINIC | Age: 55
End: 2025-08-06
Payer: COMMERCIAL

## 2025-08-06 VITALS
HEART RATE: 98 BPM | RESPIRATION RATE: 18 BRPM | TEMPERATURE: 98 F | DIASTOLIC BLOOD PRESSURE: 86 MMHG | WEIGHT: 173 LBS | SYSTOLIC BLOOD PRESSURE: 146 MMHG | BODY MASS INDEX: 27.8 KG/M2 | OXYGEN SATURATION: 97 % | HEIGHT: 66 IN

## 2025-08-06 DIAGNOSIS — E11.9 TYPE 2 DIABETES MELLITUS TREATED WITH INSULIN: ICD-10-CM

## 2025-08-06 DIAGNOSIS — Z80.3 FAMILY HISTORY OF BREAST CANCER IN SISTER: ICD-10-CM

## 2025-08-06 DIAGNOSIS — Z79.4 TYPE 2 DIABETES MELLITUS TREATED WITH INSULIN: ICD-10-CM

## 2025-08-06 DIAGNOSIS — Z85.3 HISTORY OF LEFT BREAST CANCER: Primary | ICD-10-CM

## 2025-08-06 DIAGNOSIS — Z91.89 INCREASED RISK OF BREAST CANCER: ICD-10-CM

## 2025-08-06 PROBLEM — Z17.0 MALIGNANT NEOPLASM OF UPPER-OUTER QUADRANT OF LEFT BREAST IN FEMALE, ESTROGEN RECEPTOR POSITIVE: Status: RESOLVED | Noted: 2018-10-09 | Resolved: 2025-08-06

## 2025-08-06 PROBLEM — C50.412 MALIGNANT NEOPLASM OF UPPER-OUTER QUADRANT OF LEFT BREAST IN FEMALE, ESTROGEN RECEPTOR POSITIVE: Status: RESOLVED | Noted: 2018-10-09 | Resolved: 2025-08-06

## 2025-08-21 RX ORDER — SEMAGLUTIDE 2.68 MG/ML
2 INJECTION, SOLUTION SUBCUTANEOUS WEEKLY
Qty: 9 ML | Refills: 0 | Status: SHIPPED | OUTPATIENT
Start: 2025-08-21

## 2025-08-22 DIAGNOSIS — G43.809 OTHER MIGRAINE WITHOUT STATUS MIGRAINOSUS, NOT INTRACTABLE: ICD-10-CM

## 2025-08-22 RX ORDER — SUMATRIPTAN SUCCINATE 100 MG/1
TABLET ORAL
Qty: 9 TABLET | Refills: 1 | Status: SHIPPED | OUTPATIENT
Start: 2025-08-22

## 2025-08-25 ENCOUNTER — HOSPITAL ENCOUNTER (OUTPATIENT)
Dept: MRI IMAGING | Facility: HOSPITAL | Age: 55
Discharge: HOME | End: 2025-08-25
Admitting: NURSE PRACTITIONER
Payer: COMMERCIAL

## 2025-08-25 DIAGNOSIS — Z80.3 FAMILY HISTORY OF BREAST CANCER IN SISTER: ICD-10-CM

## 2025-08-25 DIAGNOSIS — Z91.89 INCREASED RISK OF BREAST CANCER: ICD-10-CM

## 2025-08-25 DIAGNOSIS — Z85.3 HISTORY OF LEFT BREAST CANCER: ICD-10-CM

## 2025-08-25 LAB — CREAT BLDA-MCNC: 0.6 MG/DL (ref 0.6–1.3)

## 2025-08-25 PROCEDURE — 77049 MRI BREAST C-+ W/CAD BI: CPT

## 2025-08-25 PROCEDURE — A9577 INJ MULTIHANCE: HCPCS | Performed by: NURSE PRACTITIONER

## 2025-08-25 PROCEDURE — 25510000002 GADOBENATE DIMEGLUMINE 529 MG/ML SOLUTION: Performed by: NURSE PRACTITIONER

## 2025-08-25 PROCEDURE — 82565 ASSAY OF CREATININE: CPT

## 2025-08-25 RX ADMIN — GADOBENATE DIMEGLUMINE 16 ML: 529 INJECTION, SOLUTION INTRAVENOUS at 13:58

## 2025-08-26 DIAGNOSIS — M54.50 ACUTE LOW BACK PAIN WITHOUT SCIATICA, UNSPECIFIED BACK PAIN LATERALITY: ICD-10-CM

## 2025-08-26 RX ORDER — TRAMADOL HYDROCHLORIDE 50 MG/1
50 TABLET ORAL 3 TIMES DAILY
Qty: 42 TABLET | Refills: 0 | Status: SHIPPED | OUTPATIENT
Start: 2025-08-26 | End: 2025-09-09